# Patient Record
Sex: MALE | Employment: OTHER | URBAN - METROPOLITAN AREA
[De-identification: names, ages, dates, MRNs, and addresses within clinical notes are randomized per-mention and may not be internally consistent; named-entity substitution may affect disease eponyms.]

---

## 2018-02-07 PROBLEM — I45.9 HEART BLOCK: Status: ACTIVE | Noted: 2018-02-07

## 2018-02-07 NOTE — PLAN OF CARE
Outside Transfer Acceptance Note    Transferring Physician or Mid Level Provider/Speciality: Dr Spencer Sprague [phone]    Accepting Physician: Marge Simmons     Date of Acceptance: 02/07/2018     Code Status: Full    Transferring Facility/Hospital: [facility]    [place] [facility] [place]    Report from Transferring Physician or Mid-Level provider/Hospital course: 62 M with a PMH of cardiomyopathy s/p bi V AICD and the ventricular lead is not capturing intermittently.     Pt has a heart block with intermittent non capture pauses of 2.2 seconds. Will need revision of his AICD with possible replacement of his ventricular lead.     Dr Melanie Sol has discussed the case and will consult    To do list upon patient arrival: consult EP    Please call extension [phone] upon patient arrival to floor for Hospital Medicine admit team assignment and for additional admit orders. If patient is coming from another [facility] facility please also call [phone] to inform the admit team/office that patient has arrived from the [facility] facility to the floor so patient can be evaluated.

## 2018-02-09 ENCOUNTER — HOSPITAL ENCOUNTER (INPATIENT)
Facility: HOSPITAL | Age: 63
LOS: 4 days | Discharge: HOME HEALTH | DRG: 227 | End: 2018-02-13
Attending: HOSPITALIST | Admitting: HOSPITALIST
Payer: MEDICARE

## 2018-02-09 ENCOUNTER — ANESTHESIA EVENT (OUTPATIENT)
Dept: MEDSURG UNIT | Facility: HOSPITAL | Age: 63
DRG: 227 | End: 2018-02-09
Payer: MEDICARE

## 2018-02-09 ENCOUNTER — ANESTHESIA EVENT (OUTPATIENT)
Dept: MEDSURG UNIT | Facility: HOSPITAL | Age: 63
End: 2018-02-09

## 2018-02-09 DIAGNOSIS — I44.2 ATRIOVENTRICULAR BLOCK, COMPLETE: ICD-10-CM

## 2018-02-09 DIAGNOSIS — T82.120A: ICD-10-CM

## 2018-02-09 DIAGNOSIS — T82.110A AICD LEAD MALFUNCTION: ICD-10-CM

## 2018-02-09 DIAGNOSIS — I50.9 CHF (CONGESTIVE HEART FAILURE): ICD-10-CM

## 2018-02-09 DIAGNOSIS — I45.9 HEART BLOCK: ICD-10-CM

## 2018-02-09 DIAGNOSIS — I49.9 ARRHYTHMIA: ICD-10-CM

## 2018-02-09 DIAGNOSIS — T82.190A OTHER MECHANICAL COMPLICATION OF CARDIAC ELECTRODE, INITIAL ENCOUNTER: ICD-10-CM

## 2018-02-09 PROBLEM — R79.89 ELEVATED TROPONIN: Status: ACTIVE | Noted: 2018-02-09

## 2018-02-09 PROBLEM — Z79.01 CHRONIC ANTICOAGULATION: Status: ACTIVE | Noted: 2018-02-09

## 2018-02-09 PROBLEM — I50.22 CHRONIC SYSTOLIC HEART FAILURE: Status: ACTIVE | Noted: 2018-02-09

## 2018-02-09 PROBLEM — E78.5 HLD (HYPERLIPIDEMIA): Status: ACTIVE | Noted: 2018-02-09

## 2018-02-09 PROBLEM — I50.30 (HFPEF) HEART FAILURE WITH PRESERVED EJECTION FRACTION: Status: ACTIVE | Noted: 2018-02-09

## 2018-02-09 PROBLEM — K22.10 ESOPHAGEAL EROSIONS: Status: ACTIVE | Noted: 2018-02-09

## 2018-02-09 PROBLEM — I20.89 CHRONIC STABLE ANGINA: Status: ACTIVE | Noted: 2018-02-09

## 2018-02-09 PROBLEM — I10 ESSENTIAL HYPERTENSION: Status: ACTIVE | Noted: 2018-02-09

## 2018-02-09 PROBLEM — I25.810 CAD (CORONARY ARTERY DISEASE) OF ARTERY BYPASS GRAFT: Status: ACTIVE | Noted: 2018-02-09

## 2018-02-09 LAB
ABO + RH BLD: NORMAL
ALBUMIN SERPL BCP-MCNC: 3.7 G/DL
ALP SERPL-CCNC: 79 U/L
ALT SERPL W/O P-5'-P-CCNC: 30 U/L
ANION GAP SERPL CALC-SCNC: 7 MMOL/L
AORTIC VALVE REGURGITATION: ABNORMAL
AST SERPL-CCNC: 26 U/L
BASOPHILS # BLD AUTO: 0.02 K/UL
BASOPHILS NFR BLD: 0.4 %
BILIRUB SERPL-MCNC: 0.7 MG/DL
BLD GP AB SCN CELLS X3 SERPL QL: NORMAL
BNP SERPL-MCNC: 158 PG/ML
BUN SERPL-MCNC: 11 MG/DL
CALCIUM SERPL-MCNC: 9.7 MG/DL
CHLORIDE SERPL-SCNC: 102 MMOL/L
CO2 SERPL-SCNC: 26 MMOL/L
CREAT SERPL-MCNC: 1.1 MG/DL
DIFFERENTIAL METHOD: ABNORMAL
DIGOXIN SERPL-MCNC: 0.6 NG/ML
EOSINOPHIL # BLD AUTO: 0.1 K/UL
EOSINOPHIL NFR BLD: 2.2 %
ERYTHROCYTE [DISTWIDTH] IN BLOOD BY AUTOMATED COUNT: 13.5 %
EST. GFR  (AFRICAN AMERICAN): >60 ML/MIN/1.73 M^2
EST. GFR  (NON AFRICAN AMERICAN): >60 ML/MIN/1.73 M^2
ESTIMATED PA SYSTOLIC PRESSURE: 43.96
GLUCOSE SERPL-MCNC: 87 MG/DL
HCT VFR BLD AUTO: 40.8 %
HGB BLD-MCNC: 13.4 G/DL
IMM GRANULOCYTES # BLD AUTO: 0.03 K/UL
IMM GRANULOCYTES NFR BLD AUTO: 0.6 %
LYMPHOCYTES # BLD AUTO: 2.2 K/UL
LYMPHOCYTES NFR BLD: 41.3 %
MAGNESIUM SERPL-MCNC: 2 MG/DL
MCH RBC QN AUTO: 31.8 PG
MCHC RBC AUTO-ENTMCNC: 32.8 G/DL
MCV RBC AUTO: 97 FL
MITRAL VALVE REGURGITATION: ABNORMAL
MONOCYTES # BLD AUTO: 0.7 K/UL
MONOCYTES NFR BLD: 12.1 %
NEUTROPHILS # BLD AUTO: 2.3 K/UL
NEUTROPHILS NFR BLD: 43.4 %
NRBC BLD-RTO: 0 /100 WBC
PHOSPHATE SERPL-MCNC: 4.1 MG/DL
PLATELET # BLD AUTO: 232 K/UL
PMV BLD AUTO: 10.4 FL
POTASSIUM SERPL-SCNC: 4.3 MMOL/L
PROT SERPL-MCNC: 7.4 G/DL
RBC # BLD AUTO: 4.22 M/UL
RETIRED EF AND QEF - SEE NOTES: 38 (ref 55–65)
SODIUM SERPL-SCNC: 135 MMOL/L
TRICUSPID VALVE REGURGITATION: ABNORMAL
TROPONIN I SERPL DL<=0.01 NG/ML-MCNC: 0.37 NG/ML
WBC # BLD AUTO: 5.37 K/UL

## 2018-02-09 PROCEDURE — 20600001 HC STEP DOWN PRIVATE ROOM

## 2018-02-09 PROCEDURE — 85025 COMPLETE CBC W/AUTO DIFF WBC: CPT

## 2018-02-09 PROCEDURE — 25000003 PHARM REV CODE 250: Performed by: STUDENT IN AN ORGANIZED HEALTH CARE EDUCATION/TRAINING PROGRAM

## 2018-02-09 PROCEDURE — 93306 TTE W/DOPPLER COMPLETE: CPT | Mod: 26,,, | Performed by: INTERNAL MEDICINE

## 2018-02-09 PROCEDURE — 80053 COMPREHEN METABOLIC PANEL: CPT

## 2018-02-09 PROCEDURE — 84484 ASSAY OF TROPONIN QUANT: CPT

## 2018-02-09 PROCEDURE — 80162 ASSAY OF DIGOXIN TOTAL: CPT

## 2018-02-09 PROCEDURE — 99223 1ST HOSP IP/OBS HIGH 75: CPT | Mod: GC,,, | Performed by: INTERNAL MEDICINE

## 2018-02-09 PROCEDURE — 99223 1ST HOSP IP/OBS HIGH 75: CPT | Mod: AI,GC,, | Performed by: HOSPITALIST

## 2018-02-09 PROCEDURE — 93306 TTE W/DOPPLER COMPLETE: CPT

## 2018-02-09 PROCEDURE — 86920 COMPATIBILITY TEST SPIN: CPT

## 2018-02-09 PROCEDURE — 36415 COLL VENOUS BLD VENIPUNCTURE: CPT

## 2018-02-09 PROCEDURE — 84100 ASSAY OF PHOSPHORUS: CPT

## 2018-02-09 PROCEDURE — 25000003 PHARM REV CODE 250: Performed by: HOSPITALIST

## 2018-02-09 PROCEDURE — 93010 ELECTROCARDIOGRAM REPORT: CPT | Mod: ,,, | Performed by: INTERNAL MEDICINE

## 2018-02-09 PROCEDURE — 83735 ASSAY OF MAGNESIUM: CPT

## 2018-02-09 PROCEDURE — 93005 ELECTROCARDIOGRAM TRACING: CPT

## 2018-02-09 PROCEDURE — 86850 RBC ANTIBODY SCREEN: CPT

## 2018-02-09 PROCEDURE — 83880 ASSAY OF NATRIURETIC PEPTIDE: CPT

## 2018-02-09 RX ORDER — IBUPROFEN 200 MG
24 TABLET ORAL
Status: DISCONTINUED | OUTPATIENT
Start: 2018-02-09 | End: 2018-02-13 | Stop reason: HOSPADM

## 2018-02-09 RX ORDER — SODIUM CHLORIDE 0.9 % (FLUSH) 0.9 %
5 SYRINGE (ML) INJECTION
Status: DISCONTINUED | OUTPATIENT
Start: 2018-02-09 | End: 2018-02-13 | Stop reason: HOSPADM

## 2018-02-09 RX ORDER — FUROSEMIDE 40 MG/1
40 TABLET ORAL 2 TIMES DAILY
Status: DISCONTINUED | OUTPATIENT
Start: 2018-02-09 | End: 2018-02-13 | Stop reason: HOSPADM

## 2018-02-09 RX ORDER — ATORVASTATIN CALCIUM 20 MG/1
40 TABLET, FILM COATED ORAL DAILY
Status: DISCONTINUED | OUTPATIENT
Start: 2018-02-09 | End: 2018-02-13 | Stop reason: HOSPADM

## 2018-02-09 RX ORDER — GLUCAGON 1 MG
1 KIT INJECTION
Status: DISCONTINUED | OUTPATIENT
Start: 2018-02-09 | End: 2018-02-12

## 2018-02-09 RX ORDER — IBUPROFEN 200 MG
16 TABLET ORAL
Status: DISCONTINUED | OUTPATIENT
Start: 2018-02-09 | End: 2018-02-13 | Stop reason: HOSPADM

## 2018-02-09 RX ORDER — AMIODARONE HYDROCHLORIDE 200 MG/1
200 TABLET ORAL 2 TIMES DAILY
Status: DISCONTINUED | OUTPATIENT
Start: 2018-02-09 | End: 2018-02-13 | Stop reason: HOSPADM

## 2018-02-09 RX ORDER — ISOSORBIDE DINITRATE 30 MG/1
20 TABLET ORAL DAILY
Status: ON HOLD | COMMUNITY
End: 2018-02-09

## 2018-02-09 RX ORDER — RANOLAZINE 500 MG/1
500 TABLET, EXTENDED RELEASE ORAL 2 TIMES DAILY
Status: DISCONTINUED | OUTPATIENT
Start: 2018-02-09 | End: 2018-02-13 | Stop reason: HOSPADM

## 2018-02-09 RX ORDER — METOPROLOL SUCCINATE 25 MG/1
12.5 TABLET, EXTENDED RELEASE ORAL DAILY
Status: ON HOLD | COMMUNITY
End: 2018-10-25 | Stop reason: SDUPTHER

## 2018-02-09 RX ORDER — OXYCODONE AND ACETAMINOPHEN 10; 325 MG/1; MG/1
1 TABLET ORAL EVERY 12 HOURS PRN
Status: ON HOLD | COMMUNITY
End: 2018-10-19

## 2018-02-09 RX ORDER — ISOSORBIDE MONONITRATE 60 MG/1
60 TABLET, EXTENDED RELEASE ORAL DAILY
Status: ON HOLD | COMMUNITY
End: 2018-02-13 | Stop reason: HOSPADM

## 2018-02-09 RX ORDER — ACETAMINOPHEN 325 MG/1
650 TABLET ORAL EVERY 4 HOURS PRN
Status: DISCONTINUED | OUTPATIENT
Start: 2018-02-09 | End: 2018-02-13 | Stop reason: HOSPADM

## 2018-02-09 RX ORDER — NITROGLYCERIN 0.4 MG/1
0.4 TABLET SUBLINGUAL EVERY 5 MIN PRN
COMMUNITY

## 2018-02-09 RX ORDER — HYDRALAZINE HYDROCHLORIDE 25 MG/1
25 TABLET, FILM COATED ORAL 3 TIMES DAILY
Status: ON HOLD | COMMUNITY
End: 2018-02-13

## 2018-02-09 RX ORDER — DIGOXIN 250 MCG
250 TABLET ORAL DAILY
Status: DISCONTINUED | OUTPATIENT
Start: 2018-02-09 | End: 2018-02-09

## 2018-02-09 RX ORDER — OXYCODONE HYDROCHLORIDE 5 MG/1
5 TABLET ORAL ONCE
Status: COMPLETED | OUTPATIENT
Start: 2018-02-09 | End: 2018-02-09

## 2018-02-09 RX ORDER — ALBUTEROL SULFATE 90 UG/1
2 AEROSOL, METERED RESPIRATORY (INHALATION) EVERY 6 HOURS PRN
COMMUNITY

## 2018-02-09 RX ORDER — ONDANSETRON 8 MG/1
8 TABLET, ORALLY DISINTEGRATING ORAL EVERY 8 HOURS PRN
Status: DISCONTINUED | OUTPATIENT
Start: 2018-02-09 | End: 2018-02-13 | Stop reason: HOSPADM

## 2018-02-09 RX ORDER — ATORVASTATIN CALCIUM 40 MG/1
40 TABLET, FILM COATED ORAL DAILY
Status: ON HOLD | COMMUNITY
End: 2018-10-26 | Stop reason: SDUPTHER

## 2018-02-09 RX ORDER — POTASSIUM CHLORIDE 750 MG/1
20 TABLET, EXTENDED RELEASE ORAL 3 TIMES DAILY
Status: ON HOLD | COMMUNITY
End: 2018-10-26 | Stop reason: SDUPTHER

## 2018-02-09 RX ORDER — ISOSORBIDE MONONITRATE 30 MG/1
30 TABLET, EXTENDED RELEASE ORAL DAILY
Status: DISCONTINUED | OUTPATIENT
Start: 2018-02-09 | End: 2018-02-13 | Stop reason: HOSPADM

## 2018-02-09 RX ORDER — ISOSORBIDE DINITRATE 20 MG/1
20 TABLET ORAL DAILY
Status: DISCONTINUED | OUTPATIENT
Start: 2018-02-09 | End: 2018-02-09

## 2018-02-09 RX ORDER — AMIODARONE HYDROCHLORIDE 400 MG/1
400 TABLET ORAL 2 TIMES DAILY
Status: ON HOLD | COMMUNITY
End: 2018-02-13 | Stop reason: HOSPADM

## 2018-02-09 RX ORDER — DIGOXIN 250 MCG
250 TABLET ORAL DAILY
Status: DISCONTINUED | OUTPATIENT
Start: 2018-02-09 | End: 2018-02-11

## 2018-02-09 RX ORDER — OXYCODONE AND ACETAMINOPHEN 10; 325 MG/1; MG/1
1 TABLET ORAL EVERY 12 HOURS PRN
Status: DISCONTINUED | OUTPATIENT
Start: 2018-02-09 | End: 2018-02-12

## 2018-02-09 RX ORDER — DIGOXIN 250 MCG
250 TABLET ORAL DAILY
Status: ON HOLD | COMMUNITY
End: 2018-02-13 | Stop reason: HOSPADM

## 2018-02-09 RX ORDER — RANOLAZINE 500 MG/1
500 TABLET, EXTENDED RELEASE ORAL 2 TIMES DAILY
COMMUNITY

## 2018-02-09 RX ORDER — DIAZEPAM 10 MG/1
10 TABLET ORAL
Status: ON HOLD | COMMUNITY
End: 2018-02-09

## 2018-02-09 RX ORDER — NITROGLYCERIN 0.4 MG/1
0.4 TABLET SUBLINGUAL EVERY 5 MIN PRN
Status: DISCONTINUED | OUTPATIENT
Start: 2018-02-09 | End: 2018-02-13 | Stop reason: HOSPADM

## 2018-02-09 RX ADMIN — ATORVASTATIN CALCIUM 40 MG: 20 TABLET, FILM COATED ORAL at 10:02

## 2018-02-09 RX ADMIN — RANOLAZINE 500 MG: 500 TABLET, FILM COATED, EXTENDED RELEASE ORAL at 09:02

## 2018-02-09 RX ADMIN — AMIODARONE HYDROCHLORIDE 200 MG: 200 TABLET ORAL at 10:02

## 2018-02-09 RX ADMIN — OXYCODONE HYDROCHLORIDE 5 MG: 5 TABLET ORAL at 05:02

## 2018-02-09 RX ADMIN — METOPROLOL SUCCINATE 12.5 MG: 25 TABLET, EXTENDED RELEASE ORAL at 10:02

## 2018-02-09 RX ADMIN — RANOLAZINE 500 MG: 500 TABLET, FILM COATED, EXTENDED RELEASE ORAL at 11:02

## 2018-02-09 RX ADMIN — SACUBITRIL AND VALSARTAN 1 TABLET: 49; 51 TABLET, FILM COATED ORAL at 09:02

## 2018-02-09 RX ADMIN — SACUBITRIL AND VALSARTAN 1 TABLET: 49; 51 TABLET, FILM COATED ORAL at 11:02

## 2018-02-09 RX ADMIN — DIGOXIN 250 MCG: 250 TABLET ORAL at 02:02

## 2018-02-09 RX ADMIN — ISOSORBIDE MONONITRATE 30 MG: 30 TABLET, EXTENDED RELEASE ORAL at 10:02

## 2018-02-09 RX ADMIN — FUROSEMIDE 40 MG: 40 TABLET ORAL at 09:02

## 2018-02-09 RX ADMIN — AMIODARONE HYDROCHLORIDE 200 MG: 200 TABLET ORAL at 09:02

## 2018-02-09 NOTE — HPI
62 year old gentleman with a hx of CAD s/p CABG 2014, ICM 38%, CHB, BiV-ICD (biotronik), AF/AFL, HTN, HLD, MVR prosthetic who presented on 1/27 after a syncopal event. Device interrogated and he was noted to be in VT for which he received appropriate shock. Also had RV lead dysfunction and pauses on interrogation did not result in syncope. No LHC was done there but he had one in 08/2017 without any significant changes. He was started on Amiodarone 400 BID and transferred here for possible lead extraction vs VT ablation.     Here he is LV paced.

## 2018-02-09 NOTE — ANESTHESIA PREPROCEDURE EVALUATION
[facility]  Anesthesia Pre-Operative Evaluation         Patient Name: Quentin Mckeon  YOB: 1955  MRN: [id number]    SUBJECTIVE:     Pre-operative evaluation for Procedure(s) (LRB):  INSERTION-ICD-BIVENTRICULAR (N/A)     02/09/2018    Quentin Mckeon is a 62 y.o. male w/ a significant PMHx of hx of DCM s/p bi V AICD, CABG 2014, HFrEF (EF 38, echo 8/2017), HTN, MV biprothetic valve repair, Afib/Aflutter (on rivaroxaban), Vfib/Vtach, former EtOH abuse, current smoker (has inhaler he has used twice in past month, no hosp admits for wheezing). Patient was admitted on 2/9/2018 for heart block with intermittent non-capture pauses of 2.2 seconds.  Will need revision of AICD with possible replacement of ventricular lead.     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    Patient now presents for the above procedure(s).      LDA: None documented.     Prev airway: None documented.    Drips: None documented.      Patient Active Problem List   Diagnosis    Heart block    AICD lead malfunction    Essential hypertension    HLD (hyperlipidemia)    Esophageal erosions    Elevated troponin    Chronic systolic heart failure    CAD (coronary artery disease) of artery bypass graft    Chronic anticoagulation    Chronic stable angina       Review of patient's allergies indicates:  No Known Allergies    Current Inpatient Medications:   amiodarone  200 mg Oral BID    atorvastatin  40 mg Oral Daily    furosemide  40 mg Oral BID    isosorbide mononitrate  30 mg Oral Daily    metoprolol succinate  12.5 mg Oral Daily    ranolazine  500 mg Oral BID    rivaroxaban  20 mg Oral Daily    sacubitril-valsartan  1 tablet Oral BID       No current facility-administered medications on file prior to encounter.      No current outpatient prescriptions on file prior to encounter.       History reviewed. No  pertinent surgical history.    Social History     Social History    Marital status: Unknown     Spouse name: N/A    Number of children: N/A    Years of education: N/A     Occupational History    Not on file.     Social History Main Topics    Smoking status: Current Every Day Smoker     Packs/day: 0.25     Years: 50.00    Smokeless tobacco: Current User     Types: Snuff    Alcohol use No    Drug use: No    Sexual activity: Not on file     Other Topics Concern    Not on file     Social History Narrative    No narrative on file       OBJECTIVE:     Vital Signs Range (Last 24H):  Temp:  [36.8 °C (98.3 °F)]   Pulse:  [79-80]   Resp:  [14]   BP: (132)/(99)   SpO2:  [93 %-96 %]       CBC:   Recent Labs      02/09/18   0706   WBC  5.37   RBC  4.22*   HGB  13.4*   HCT  40.8   PLT  232   MCV  97   MCH  31.8*   MCHC  32.8       CMP:   Recent Labs      02/09/18   0706   NA  135*   K  4.3   CL  102   CO2  26   BUN  11   CREATININE  1.1   GLU  87   MG  2.0   PHOS  4.1   CALCIUM  9.7   ALBUMIN  3.7   PROT  7.4   ALKPHOS  79   ALT  30   AST  26   BILITOT  0.7       INR:  No results for input(s): PT, INR, PROTIME, APTT in the last 72 hours.    Diagnostic Studies:     EKG: Pending    2D ECHO:  Results for orders placed or performed during the hospital encounter of 02/09/18   2D echo with color flow doppler   Result Value Ref Range    EF 38 (A) 55 - 65    Mitral Valve Regurgitation TRIVIAL     Aortic Valve Regurgitation TRIVIAL     Est. PA Systolic Pressure 43.96 (A)     Tricuspid Valve Regurgitation MILD          ASSESSMENT/PLAN:       Anesthesia Evaluation    I have reviewed the Patient Summary Reports.     I have reviewed the Medications.     Review of Systems  Anesthesia Hx:  No problems with previous Anesthesia Denies Hx of Anesthetic complications  History of prior surgery of interest to airway management or planning: Denies Family Hx of Anesthesia complications.   Denies Personal Hx of Anesthesia complications.    Social:  Smoker, No Alcohol Use    Hematology/Oncology:     Oncology Normal    -- Anemia:   EENT/Dental:EENT/Dental Normal   Cardiovascular:   Pacemaker Hypertension Past MI CAD  CABG/stent Dysrhythmias  Angina, with exertion CHF hyperlipidemia    Pulmonary:   Denies COPD.  Denies Asthma. Current smoker   Renal/:  Renal/ Normal     Hepatic/GI:  Hepatic/GI Normal    Neurological:  Neurology Normal    Endocrine:  Endocrine Normal    Psych:  Psychiatric Normal           Physical Exam  General:  Obesity    Airway/Jaw/Neck:  Airway Findings: Mouth Opening: Normal Tongue: Normal  General Airway Assessment: Adult  Mallampati: II  TM Distance: Normal, at least 6 cm  Jaw/Neck Findings:  Neck ROM: Normal ROM  Neck Findings: Normal    Eyes/Ears/Nose:  EYES/EARS/NOSE FINDINGS: Normal   Dental:  Dental Findings: In tact    Chest/Lungs:  Chest/Lungs Findings: Clear to auscultation, Normal Respiratory Rate     Heart/Vascular:  Heart Findings: Rate: Normal  Rhythm: Regular Rhythm  Sounds: Normal  Heart murmur: negative       Mental Status:  Mental Status Findings:  Cooperative, Alert and Oriented         Anesthesia Plan  Type of Anesthesia, risks & benefits discussed:  Anesthesia Type:  general, MAC  Patient's Preference:   Intra-op Monitoring Plan: standard ASA monitors  Intra-op Monitoring Plan Comments:   Post Op Pain Control Plan: multimodal analgesia, IV/PO Opioids PRN and per primary service following discharge from PACU  Post Op Pain Control Plan Comments:   Induction:   IV  Beta Blocker:  Patient is on a Beta-Blocker and has received one dose within the past 24 hours (No further documentation required).       Informed Consent: Patient understands risks and agrees with Anesthesia plan.  Questions answered. Anesthesia consent signed with patient.  ASA Score: 3     Day of Surgery Review of History & Physical: I have interviewed and examined the patient. I have reviewed the patient's H&P dated:  There are no significant  changes.  H&P update referred to the provider.         Ready For Surgery From Anesthesia Perspective.

## 2018-02-09 NOTE — ASSESSMENT & PLAN NOTE
- Patient is asymptomatic and stable  - Treating with home meds- ranolazine and nitroglycerin as needed  - Continue to monitor

## 2018-02-09 NOTE — ASSESSMENT & PLAN NOTE
- Patient is asymptomatic and stable  - Treating with home meds- Toprol, Digoxin, Isosorbide dinitrate,   - Continue to monitor

## 2018-02-09 NOTE — SUBJECTIVE & OBJECTIVE
History reviewed. No pertinent past medical history.    History reviewed. No pertinent surgical history.    Review of patient's allergies indicates:  No Known Allergies    No current facility-administered medications on file prior to encounter.      No current outpatient prescriptions on file prior to encounter.     Family History     None        Social History Main Topics    Smoking status: Current Every Day Smoker     Packs/day: 0.25     Years: 50.00    Smokeless tobacco: Current User     Types: Snuff    Alcohol use No    Drug use: No    Sexual activity: Not on file     Review of Systems   Constitutional: Negative for chills, fever and unexpected weight change.   HENT: Negative for hearing loss.    Eyes: Negative for visual disturbance.   Respiratory: Negative for cough, shortness of breath and wheezing.    Cardiovascular: Negative for chest pain, palpitations and leg swelling.   Gastrointestinal: Negative for abdominal pain, blood in stool, constipation, diarrhea, nausea and vomiting.   Genitourinary: Negative for dysuria and hematuria.   Musculoskeletal: Negative for arthralgias.   Skin: Negative for rash.   Neurological: Negative for dizziness, tremors, seizures, syncope, weakness and headaches.   Hematological: Negative for adenopathy. Does not bruise/bleed easily.     Objective:     Vital Signs (Most Recent):  Temp: 98.3 °F (36.8 °C) (02/09/18 0415)  Pulse: 80 (02/09/18 0700)  Resp: 14 (02/09/18 0445)  BP: (!) 132/99 (02/09/18 0445)  SpO2: 96 % (02/09/18 0501) Vital Signs (24h Range):  Temp:  [98.3 °F (36.8 °C)] 98.3 °F (36.8 °C)  Pulse:  [79-80] 80  Resp:  [14] 14  SpO2:  [93 %-96 %] 96 %  BP: (132)/(99) 132/99     Weight: 105.2 kg (231 lb 14.8 oz)  Body mass index is 34.25 kg/m².    Physical Exam   Constitutional: He appears well-developed and well-nourished.   HENT:   Head: Normocephalic and atraumatic.   Eyes: EOM are normal. Pupils are equal, round, and reactive to light.   Neck: No JVD present. No  tracheal deviation present. No thyromegaly present.   Cardiovascular: Normal rate, regular rhythm and normal heart sounds.    No murmur heard.  Pulmonary/Chest: Effort normal and breath sounds normal. He has no wheezes. He has no rales.   Abdominal: Soft. He exhibits no distension. There is no tenderness. No hernia.   Musculoskeletal: He exhibits no edema or tenderness.   Neurological: No cranial nerve deficit. He exhibits normal muscle tone.   Skin: Skin is warm. No rash noted.   Psychiatric: He has a normal mood and affect.         CRANIAL NERVES     CN III, IV, VI   Pupils are equal, round, and reactive to light.  Extraocular motions are normal.        Significant Labs:   CBC:   Recent Labs  Lab 02/09/18  0706   WBC 5.37   HGB 13.4*   HCT 40.8        CMP:   Recent Labs  Lab 02/09/18  0706   *   K 4.3      CO2 26   GLU 87   BUN 11   CREATININE 1.1   CALCIUM 9.7   PROT 7.4   ALBUMIN 3.7   BILITOT 0.7   ALKPHOS 79   AST 26   ALT 30   ANIONGAP 7*   EGFRNONAA >60.0     Cardiac Markers:   Recent Labs  Lab 02/09/18  0706   *     Magnesium:   Recent Labs  Lab 02/09/18  0706   MG 2.0     Troponin:   Recent Labs  Lab 02/09/18  0706   TROPONINI 0.373*       Significant Imaging: I have reviewed all pertinent imaging results/findings within the past 24 hours.   Echo CONCLUSIONS     1 - Mild left ventricular enlargement.     2 - Moderately depressed left ventricular systolic function (EF 35-40%).     3 - Eccentric hypertrophy.     4 - Wall motion abnormalities.     5 - Biatrial enlargement.     6 - Low normal to mildly depressed right ventricular systolic function .     7 - Trivial aortic regurgitation.     8 - Mitral valve prosthesis.     9 - Trivial mitral regurgitation.     10 - Mild tricuspid regurgitation.     11 - Trivial pulmonic regurgitation.     12 - Pulmonary hypertension. The estimated PA systolic pressure is 44 mmHg.

## 2018-02-09 NOTE — CONSULTS
[facility]  Cardiac Electrophysiology  Consult Note    Admission Date: 2/9/2018  Code Status: Full Code   Attending Provider: Tre Can MD  Consulting Provider: Anna Gómez MD  Principal Problem:AICD lead malfunction    Inpatient consult to Electrophysiology  Consult performed by: ANNA GÓMEZ  Consult ordered by: JE MELTON  Reason for consult: Lead fracture        Subjective:     Chief Complaint:  syncope     HPI:   62 year old gentleman with a hx of CAD s/p CABG 2014, ICM 38%, CHB, BiV-ICD (biotronik), AF/AFL, HTN, HLD, MVR prosthetic who presented on 1/27 after a syncopal event. Device interrogated and he was noted to be in VT for which he received appropriate shock. Also had RV lead dysfunction and pauses on interrogation did not result in syncope. No LHC was done there but he had one in 08/2017 without any significant changes. He was started on Amiodarone 400 BID and transferred here for possible lead extraction vs VT ablation.     Here he is LV paced.     History as above    History as above.     Review of patient's allergies indicates:  No Known Allergies    No current facility-administered medications on file prior to encounter.      No current outpatient prescriptions on file prior to encounter.     Family History     None        Social History Main Topics    Smoking status: Current Every Day Smoker     Packs/day: 0.25     Years: 50.00    Smokeless tobacco: Current User     Types: Snuff    Alcohol use No    Drug use: No    Sexual activity: Not on file     Review of Systems   All other systems reviewed and are negative.    Objective:     Vital Signs (Most Recent):  Temp: 97.9 °F (36.6 °C) (02/09/18 0800)  Pulse: 80 (02/09/18 1100)  Resp: 18 (02/09/18 0800)  BP: (!) 120/91 (02/09/18 0800)  SpO2: (!) 91 % (02/09/18 0800) Vital Signs (24h Range):  Temp:  [97.9 °F (36.6 °C)-98.3 °F (36.8 °C)] 97.9 °F (36.6 °C)  Pulse:  [79-80] 80  Resp:  [14-18] 18  SpO2:  [91 %-96 %]  91 %  BP: (120-132)/(91-99) 120/91     Weight: 105.2 kg (231 lb 14.8 oz)  Body mass index is 34.25 kg/m².    SpO2: (!) 91 %  O2 Device (Oxygen Therapy): room air    Physical Exam  GEN: Alert and oriented in NAD  NECK: no JVD appreciated  CVS: RRR, s1/s2  PULM: CTAB no rales  ABD: NT/ND BS +  Extremities: warm and dry, palpable pulses, no edema  NEURO: Alert and oriented x 3  PSYCH: appropriate affect.       Significant Labs: reviewed    Significant Imaging: reviewed    Assessment and Plan:     * AICD lead malfunction    62 year old gentleman with a hx of CAD s/p CABG 2014, ICM 38%, CHB, BiV-ICD (biotronik), AF/AFL, HTN, HLD, MVR prosthetic who presented on 1/27 after a syncopal event. Device interrogated and he was noted to be in VT for which he received appropriate shock. Also had RV lead dysfunction and pauses on interrogation did not result in syncope. No LHC was done there but he had one in 08/2017 without any significant changes. He was started on Amiodarone 400 BID and transferred here for possible lead extraction vs VT ablation.     With likely lead fracture will need an extraction and lead revision which is planned for Monday. Will stop xarelto. Have ordered type and screen and prepared 4 units of blood. Have ordered a device interrogation.             Thank you for your consult. I will follow-up with patient. Please contact us if you have any additional questions.    Sean Gómez MD  Cardiac Electrophysiology  [facility]

## 2018-02-09 NOTE — ANESTHESIA PREPROCEDURE EVALUATION
"[facility]  Anesthesia Pre-Operative Evaluation         Patient Name: Quentin Mckeon  YOB: 1955  MRN: [id number]    SUBJECTIVE:     Pre-operative evaluation for Procedure(s) (LRB):  EXTRACTION-LEAD (N/A)  Scheduled for 2/12/2018    HPI 02/09/2018:  Quentin Mckeon is a 62 y.o. male with hx of DCM s/p bi V AICD, CABG 2014, HFrEF (EF 38, echo 8/2017), HTN, MV biprothetic valve repair, Afib/Aflutter (on rivaroxaban), Vfib/Vtach, former EtOH abuse, current smoker (has inhaler he has used twice in past month, no hosp admits for wheezing).    TTE 2/9/2018: MVR prosthesis, pulm HTN 44, LVEF 35%, biatrial enlargement, low-mild depressed RV systolic function.  Per patient, hx of "small heart attack" in the past.    The following segments were dyskinetic: mid inferoseptum, basal inferoseptum.  The following segments were akinetic: apical inferior wall.  The following segments were moderately hypokinetic: basal inferior wall.  The following segments were severely hypokinetic: apical septum, mid inferior wall.    Patient was admitted on 2/9/2018 for heart block with intermittent non-capture pauses of 2.2 seconds.  Will need revision of AICD with possible replacement of ventricular lead.  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    Patient presents for the above procedure(s).    Prev airway:   No prior records in Epic or Legacy Documents.    Oxygen/Ventilation Requirements:  On room air satting low to mid 90s.       Current LDA:   None documented.       Current Drips:  None documented.      Patient Active Problem List   Diagnosis    Heart block       Review of patient's allergies indicates:  No Known Allergies    Outpatient Medications:  No current facility-administered medications on file prior to encounter.      No current outpatient prescriptions on file prior to encounter.        Current " Inpatient Medications:   amiodarone  200 mg Oral BID    atorvastatin  40 mg Oral Daily    digoxin  250 mcg Oral Daily    furosemide  40 mg Oral BID    isosorbide dinitrate  20 mg Oral Daily    metoprolol succinate  12.5 mg Oral Daily    ranolazine  500 mg Oral BID    rivaroxaban  20 mg Oral Daily    sacubitril-valsartan  1 tablet Oral BID       History reviewed. No pertinent surgical history.    Social History     Social History    Marital status: Unknown     Spouse name: N/A    Number of children: N/A    Years of education: N/A     Occupational History    Not on file.     Social History Main Topics    Smoking status: Current Every Day Smoker     Packs/day: 0.25     Years: 50.00    Smokeless tobacco: Current User     Types: Snuff    Alcohol use No    Drug use: No    Sexual activity: Not on file     Other Topics Concern    Not on file     Social History Narrative    No narrative on file       OBJECTIVE:   Weight:  Wt Readings from Last 4 Encounters:   02/09/18 105.2 kg (231 lb 14.8 oz)       Vital Signs Range (Last 24H):  Temp:  [36.8 °C (98.3 °F)]   Pulse:  [79-80]   Resp:  [14]   BP: (132)/(99)   SpO2:  [93 %-96 %]       CBC:   Recent Labs      02/09/18   0706   WBC  5.37   RBC  4.22*   HGB  13.4*   HCT  40.8   PLT  232   MCV  97   MCH  31.8*   MCHC  32.8       CMP: No results for input(s): NA, K, CL, CO2, BUN, CREATININE, GLU, MG, PHOS, CALCIUM, ALBUMIN, PROT, ALKPHOS, ALT, AST, BILITOT in the last 72 hours.    INR:  No results for input(s): INR, PROTIME, APTT in the last 72 hours.    Diagnostic Studies:  none    EKG:  In process     2D Echo:  2/9/2018    1 - Mild left ventricular enlargement.     2 - Moderately depressed left ventricular systolic function (EF 35-40%).     3 - Eccentric hypertrophy.     4 - Wall motion abnormalities.     5 - Biatrial enlargement.     6 - Low normal to mildly depressed right ventricular systolic function .     7 - Trivial aortic regurgitation.     8 - Mitral  valve prosthesis.     9 - Trivial mitral regurgitation.     10 - Mild tricuspid regurgitation.     11 - Trivial pulmonic regurgitation.     12 - Pulmonary hypertension. The estimated PA systolic pressure is 44 mmHg.     ASSESSMENT/PLAN:         Anesthesia Evaluation    I have reviewed the Patient Summary Reports.     I have reviewed the Medications.     Review of Systems  Anesthesia Hx:  No problems with previous Anesthesia Denies Hx of Anesthetic complications  History of prior surgery of interest to airway management or planning: Denies Family Hx of Anesthesia complications.   Denies Personal Hx of Anesthesia complications.   Social:  Smoker, No Alcohol Use    Hematology/Oncology:     Oncology Normal    -- Anemia:   EENT/Dental:EENT/Dental Normal   Cardiovascular:   Pacemaker Hypertension Past MI CAD  CABG/stent Dysrhythmias  Angina, with exertion CHF hyperlipidemia    Pulmonary:   Denies COPD.  Denies Asthma. Current smoker   Renal/:  Renal/ Normal     Hepatic/GI:  Hepatic/GI Normal    Neurological:  Neurology Normal    Endocrine:  Endocrine Normal    Psych:  Psychiatric Normal           Physical Exam  General:  Obesity    Airway/Jaw/Neck:  Airway Findings: Mouth Opening: Normal Tongue: Normal  General Airway Assessment: Adult  Mallampati: II  TM Distance: Normal, at least 6 cm  Jaw/Neck Findings:  Neck ROM: Normal ROM  Neck Findings: Normal    Eyes/Ears/Nose:  EYES/EARS/NOSE FINDINGS: Normal   Dental:  Dental Findings: In tact    Chest/Lungs:  Chest/Lungs Findings: Clear to auscultation, Normal Respiratory Rate     Heart/Vascular:  Heart Findings: Rate: Normal  Rhythm: Regular Rhythm  Sounds: Normal  Heart murmur: negative       Mental Status:  Mental Status Findings:  Cooperative, Alert and Oriented         Anesthesia Plan  Type of Anesthesia, risks & benefits discussed:  Anesthesia Type:  general  Patient's Preference:   Intra-op Monitoring Plan: standard ASA monitors and arterial line  Intra-op Monitoring  Plan Comments:   Post Op Pain Control Plan: multimodal analgesia, IV/PO Opioids PRN and per primary service following discharge from PACU  Post Op Pain Control Plan Comments:   Induction:   IV  Beta Blocker:  Patient is on a Beta-Blocker and has received one dose within the past 24 hours (No further documentation required).       Informed Consent: Patient understands risks and agrees with Anesthesia plan.  Questions answered. Anesthesia consent signed with patient.  ASA Score: 3     Day of Surgery Review of History & Physical:  There are no significant changes.  H&P update referred to the provider.         Ready For Surgery From Anesthesia Perspective.

## 2018-02-09 NOTE — ASSESSMENT & PLAN NOTE
62 year old gentleman with a hx of CAD s/p CABG 2014, ICM 38%, CHB, BiV-ICD (biotronik), AF/AFL, HTN, HLD, MVR prosthetic who presented on 1/27 after a syncopal event. Device interrogated and he was noted to be in VT for which he received appropriate shock. Also had RV lead dysfunction and pauses on interrogation did not result in syncope. No LHC was done there but he had one in 08/2017 without any significant changes. He was started on Amiodarone 400 BID and transferred here for possible lead extraction vs VT ablation.     With likely lead fracture will need an extraction and lead revision which is planned for Monday. Will stop xarelto. Have ordered type and screen and prepared 4 units of blood. Have ordered a device interrogation.

## 2018-02-09 NOTE — PHARMACY MED REC
"Admission Medication Reconciliation - Pharmacy Consult Note    The home medication history was taken by Aleena Mena Pharmacy Tech.  Based on information gathered and subsequent review by the clinical pharmacist, the items below may need attention.     You may go to "Admission" then "Reconcile Home Medications" tabs to review and/or act upon these items. Based on information gathered and subsequent review by the clinical pharmacist, the items below may need attention.    Potentially problematic discrepancies with current MAR  o Patient is taking a drug DIFFERENTLY than how ordered upon admit  o Amiodarone 400 mg bid   o Lasix 60 mg daily    Please address this information as you see fit.  Feel free to contact us if you have any questions or require assistance.    Carmela Holt PharmD, [facility]  Internal Medicine Clinical Pharmacy Specialist  Spectra link: [phone]            .    .        "

## 2018-02-09 NOTE — ASSESSMENT & PLAN NOTE
- Patient is asymptomatic and stable  - Treating with home meds- lipitor   - Continue to monitor

## 2018-02-09 NOTE — SUBJECTIVE & OBJECTIVE
History reviewed. No pertinent past medical history.    History reviewed. No pertinent surgical history.    Review of patient's allergies indicates:  No Known Allergies    No current facility-administered medications on file prior to encounter.      No current outpatient prescriptions on file prior to encounter.     Family History     None        Social History Main Topics    Smoking status: Current Every Day Smoker     Packs/day: 0.25     Years: 50.00    Smokeless tobacco: Current User     Types: Snuff    Alcohol use No    Drug use: No    Sexual activity: Not on file     Review of Systems   All other systems reviewed and are negative.    Objective:     Vital Signs (Most Recent):  Temp: 97.9 °F (36.6 °C) (02/09/18 0800)  Pulse: 80 (02/09/18 1100)  Resp: 18 (02/09/18 0800)  BP: (!) 120/91 (02/09/18 0800)  SpO2: (!) 91 % (02/09/18 0800) Vital Signs (24h Range):  Temp:  [97.9 °F (36.6 °C)-98.3 °F (36.8 °C)] 97.9 °F (36.6 °C)  Pulse:  [79-80] 80  Resp:  [14-18] 18  SpO2:  [91 %-96 %] 91 %  BP: (120-132)/(91-99) 120/91     Weight: 105.2 kg (231 lb 14.8 oz)  Body mass index is 34.25 kg/m².    SpO2: (!) 91 %  O2 Device (Oxygen Therapy): room air    Physical Exam  GEN: Alert and oriented in NAD  NECK: no JVD appreciated  CVS: RRR, s1/s2  PULM: CTAB no rales  ABD: NT/ND BS +  Extremities: warm and dry, palpable pulses, no edema  NEURO: Alert and oriented x 3  PSYCH: appropriate affect.       Significant Labs: reviewed    Significant Imaging: reviewed

## 2018-02-09 NOTE — NURSING
Patient arrived to floor on stretcher from [facility]. Patient placed on CSU telemetry monitor. Patient is AAO. VSS, NAD noted, Patient oriented to room and floor, CSU orders implemented. Plan of care initiated. Patient notified family of admit and room number. Patient in chair, chair in locked position, side rails up x2, call bell in reach.

## 2018-02-09 NOTE — HPI
Patient is a 62 yo M with DCM s/p bi V AICD, CABG 2014, HFrEF (EF 38, echo 8/2017), HTN, MV biprothetic valve repair, Afib/Aflutter, Vfib/Vtach, former EtOH abuse, tobacco use,  who presents with a ventricular lead that is not capturing intermittently. On 1/27/18 the patient was at home watching television on the side of his bed when he passed out. This came out of nowhere and he fell back onto the bed and the next thing he knew he was coming to which he estimated at just a few seconds. Patient did not lose continence or have any jerking movements he was aware of. While coming to he was a bit confused but realized something was wrong, called 911 and got transported to the hospital. While in the hospital, the patient had his leads interrogated and showed a V tach episode with appropriate shock. A CT head was negative done at this time. Once patient was at the hospital, there is a note from cardiology about chest pain radiating down his arm and him taking nitro with no relief, but patient denies this to me. Cardiology consultation at [place] noted that he has malfunction of the pacing portion of the right ventricular lead resulting in noise on the RV lead channel.  Pt has a heart block with intermittent non capture pauses of 2.2 seconds. This was deemed to be not leading to his syncope as that is a result of ventricular arrhythmia which they say it may be reasonable to consider an endocardial VT ablation with assistance of impella or IABP for recurrent hemodynamically unstable sustained V tach despite amiodarone therapy requiring ICD shock for termination in the setting of chronic systolic dysfunction.   Patient became febrile on 1/31/18 and ID was consulted. They diagnosed pt with the flu while in hospital. BCx and UCx were negative and he was treated with tamiflu. Pt also had a TTE showinga mobile echogenic mass attached to his pacer wire. 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 Vanc and rocephin were also started, but stopped on 2/1/18 when it was determined that the vegetation was not an infection.      At [facility] 2/9/18 the patient has no complaints and is asymptomatic. He has recovered from the flu and is awaiting a consult with EP for R ventricular lead replacement in his AICD. Of note patient is former alcoholic and only other syncope episode was while he was drinking. He was at that time drinking 1/5 of elisabeth posadas per day. He has been sober for 1 year now.

## 2018-02-09 NOTE — ASSESSMENT & PLAN NOTE
- Patient is asymptomatic and stable  - Treating with home meds- Lasix 40 BID, Toprol, Digoxin, Isosorbide dinitrate,   - Continue to monitor

## 2018-02-09 NOTE — ASSESSMENT & PLAN NOTE
- Patient with heart block as seen by cardiology at OSH.   - EP consulted- appreciate recs  - Echo ordered- results as above.  - Treat with amiodarone and toprol XL  - Currently patient is asymptomatic/stable, continue to monitor

## 2018-02-09 NOTE — ASSESSMENT & PLAN NOTE
- Patient without CP but with troponin .37, which is less than outside hospital, where he presented with troponin of .55.  - EKG ordered  - Patient asymptomatic, monitor

## 2018-02-09 NOTE — ASSESSMENT & PLAN NOTE
- Patient with R ventricular lead malfunction.  - EP consult- appreciate recs  - Currently patient is asymptomatic/stable, continue to monitor

## 2018-02-09 NOTE — H&P
[facility] Hospital Medicine  History & Physical    Patient Name: Quentin Mckeon  MRN: [id number]  Admission Date: 2/9/2018  Attending Physician: Tre Can MD   Primary Care Provider: Primary Doctor [facility] Medicine Team: [facility] HOSP MED 4 Humberto Hernandez MD     Patient information was obtained from patient and ER records.     Subjective:     Principal Problem:AICD lead malfunction    Chief Complaint: No chief complaint on file.       HPI: Patient is a 62 yo M with DCM s/p bi V AICD, CABG 2014, HFrEF (EF 38, echo 8/2017), HTN, MV biprothetic valve repair, Afib/Aflutter, Vfib/Vtach, former EtOH abuse, tobacco use,  who presents with a ventricular lead that is not capturing intermittently. On 1/27/18 the patient was at home watching television on the side of his bed when he passed out. This came out of nowhere and he fell back onto the bed and the next thing he knew he was coming to which he estimated at just a few seconds. Patient did not lose continence or have any jerking movements he was aware of. While coming to he was a bit confused but realized something was wrong, called 911 and got transported to the hospital. While in the hospital, the patient had his leads interrogated and showed a V tach episode with appropriate shock. A CT head was negative done at this time. Once patient was at the hospital, there is a note from cardiology about chest pain radiating down his arm and him taking nitro with no relief, but patient denies this to me. Cardiology consultation at [place] noted that he has malfunction of the pacing portion of the right ventricular lead resulting in noise on the RV lead channel.  Pt has a heart block with intermittent non capture pauses of 2.2 seconds. This was deemed to be not leading to his syncope as that is a result of ventricular arrhythmia which they say it may be reasonable to consider an endocardial VT ablation with assistance of impella or IABP for recurrent  hemodynamically unstable sustained V tach despite amiodarone therapy requiring ICD shock for termination in the setting of chronic systolic dysfunction.   Patient became febrile on 1/31/18 and ID was consulted. They diagnosed pt with the flu while in hospital. BCx and UCx were negative and he was treated with tamiflu. Pt also had a TTE showinga mobile echogenic mass attached to his pacer wire. 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 Vanc and rocephin were also started, but stopped on 2/1/18 when it was determined that the vegetation was not an infection.    At [facility] 2/9/18 the patient has no complaints and is asymptomatic. He has recovered from the flu and is awaiting a consult with EP for R ventricular lead replacement in his AICD. Of note patient is former alcoholic and only other syncope episode was while he was drinking. He was at that time drinking 1/5 of elisabeth posadas per day. He has been sober for 1 year now.     History reviewed. No pertinent past medical history.    History reviewed. No pertinent surgical history.    Review of patient's allergies indicates:  No Known Allergies    No current facility-administered medications on file prior to encounter.      No current outpatient prescriptions on file prior to encounter.     Family History     None        Social History Main Topics    Smoking status: Current Every Day Smoker     Packs/day: 0.25     Years: 50.00    Smokeless tobacco: Current User     Types: Snuff    Alcohol use No    Drug use: No    Sexual activity: Not on file     Review of Systems   Constitutional: Negative for chills, fever and unexpected weight change.   HENT: Negative for hearing loss.    Eyes: Negative for visual disturbance.   Respiratory: Negative for cough, shortness of breath and wheezing.    Cardiovascular: Negative for chest pain, palpitations and leg swelling.   Gastrointestinal: Negative for abdominal pain, blood in  stool, constipation, diarrhea, nausea and vomiting.   Genitourinary: Negative for dysuria and hematuria.   Musculoskeletal: Negative for arthralgias.   Skin: Negative for rash.   Neurological: Negative for dizziness, tremors, seizures, syncope, weakness and headaches.   Hematological: Negative for adenopathy. Does not bruise/bleed easily.     Objective:     Vital Signs (Most Recent):  Temp: 98.3 °F (36.8 °C) (02/09/18 0415)  Pulse: 80 (02/09/18 0700)  Resp: 14 (02/09/18 0445)  BP: (!) 132/99 (02/09/18 0445)  SpO2: 96 % (02/09/18 0501) Vital Signs (24h Range):  Temp:  [98.3 °F (36.8 °C)] 98.3 °F (36.8 °C)  Pulse:  [79-80] 80  Resp:  [14] 14  SpO2:  [93 %-96 %] 96 %  BP: (132)/(99) 132/99     Weight: 105.2 kg (231 lb 14.8 oz)  Body mass index is 34.25 kg/m².    Physical Exam   Constitutional: He appears well-developed and well-nourished.   HENT:   Head: Normocephalic and atraumatic.   Eyes: EOM are normal. Pupils are equal, round, and reactive to light.   Neck: No JVD present. No tracheal deviation present. No thyromegaly present.   Cardiovascular: Normal rate, regular rhythm and normal heart sounds.    No murmur heard.  Pulmonary/Chest: Effort normal and breath sounds normal. He has no wheezes. He has no rales.   Abdominal: Soft. He exhibits no distension. There is no tenderness. No hernia.   Musculoskeletal: He exhibits no edema or tenderness.   Neurological: No cranial nerve deficit. He exhibits normal muscle tone.   Skin: Skin is warm. No rash noted.   Psychiatric: He has a normal mood and affect.         CRANIAL NERVES     CN III, IV, VI   Pupils are equal, round, and reactive to light.  Extraocular motions are normal.        Significant Labs:   CBC:   Recent Labs  Lab 02/09/18  0706   WBC 5.37   HGB 13.4*   HCT 40.8        CMP:   Recent Labs  Lab 02/09/18  0706   *   K 4.3      CO2 26   GLU 87   BUN 11   CREATININE 1.1   CALCIUM 9.7   PROT 7.4   ALBUMIN 3.7   BILITOT 0.7   ALKPHOS 79   AST 26    ALT 30   ANIONGAP 7*   EGFRNONAA >60.0     Cardiac Markers:   Recent Labs  Lab 02/09/18  0706   *     Magnesium:   Recent Labs  Lab 02/09/18  0706   MG 2.0     Troponin:   Recent Labs  Lab 02/09/18  0706   TROPONINI 0.373*       Significant Imaging: I have reviewed all pertinent imaging results/findings within the past 24 hours.   Echo CONCLUSIONS     1 - Mild left ventricular enlargement.     2 - Moderately depressed left ventricular systolic function (EF 35-40%).     3 - Eccentric hypertrophy.     4 - Wall motion abnormalities.     5 - Biatrial enlargement.     6 - Low normal to mildly depressed right ventricular systolic function .     7 - Trivial aortic regurgitation.     8 - Mitral valve prosthesis.     9 - Trivial mitral regurgitation.     10 - Mild tricuspid regurgitation.     11 - Trivial pulmonic regurgitation.     12 - Pulmonary hypertension. The estimated PA systolic pressure is 44 mmHg.         Assessment/Plan:     * AICD lead malfunction    - Patient with R ventricular lead malfunction.  - EP consult- appreciate recs  - Currently patient is asymptomatic/stable, continue to monitor            Heart block    - Patient with heart block as seen by cardiology at OSH.   - EP consulted- appreciate recs  - Echo ordered- results as above.  - Treat with amiodarone and toprol XL  - Currently patient is asymptomatic/stable, continue to monitor          Chronic systolic heart failure    - Patient is asymptomatic and stable  - Treating with home meds- Lasix 40 BID, Toprol, Digoxin, Isosorbide dinitrate,   - Continue to monitor            Elevated troponin    - Patient without CP but with troponin .37, which is less than outside hospital, where he presented with troponin of .55.  - EKG ordered  - Patient asymptomatic, monitor          Essential hypertension    - Patient is asymptomatic and stable  - Treating with home meds- Toprol, Digoxin, Isosorbide dinitrate,   - Continue to monitor            HLD  (hyperlipidemia)    - Patient is asymptomatic and stable  - Treating with home meds- lipitor   - Continue to monitor            Esophageal erosions    - Patient is asymptomatic and stable  - Continue to monitor            CAD (coronary artery disease) of artery bypass graft    - Patient with CABG in 2014  - Currently asymptomatic          Chronic stable angina    - Patient is asymptomatic and stable  - Treating with home meds- ranolazine and nitroglycerin as needed  - Continue to monitor            Chronic anticoagulation    - Treat with xarelto            VTE Risk Mitigation         Ordered     rivaroxaban tablet 20 mg  Daily     Route:  Oral        02/09/18 0713     High Risk of VTE  Once      02/09/18 0713             Humberto Hernandez MD  Department of Hospital Medicine   [facility]

## 2018-02-10 PROBLEM — I48.91 ATRIAL FIBRILLATION: Status: ACTIVE | Noted: 2018-02-10

## 2018-02-10 PROBLEM — I47.29 VENTRICULAR TACHYCARDIA, MONOMORPHIC: Status: ACTIVE | Noted: 2018-02-10

## 2018-02-10 LAB
ALBUMIN SERPL BCP-MCNC: 3.5 G/DL
ALP SERPL-CCNC: 80 U/L
ALT SERPL W/O P-5'-P-CCNC: 29 U/L
ANION GAP SERPL CALC-SCNC: 5 MMOL/L
AST SERPL-CCNC: 22 U/L
BASOPHILS # BLD AUTO: 0.02 K/UL
BASOPHILS NFR BLD: 0.4 %
BILIRUB SERPL-MCNC: 0.6 MG/DL
BUN SERPL-MCNC: 11 MG/DL
CALCIUM SERPL-MCNC: 9.7 MG/DL
CHLORIDE SERPL-SCNC: 102 MMOL/L
CO2 SERPL-SCNC: 29 MMOL/L
CREAT SERPL-MCNC: 1 MG/DL
DIFFERENTIAL METHOD: ABNORMAL
EOSINOPHIL # BLD AUTO: 0.2 K/UL
EOSINOPHIL NFR BLD: 3.2 %
ERYTHROCYTE [DISTWIDTH] IN BLOOD BY AUTOMATED COUNT: 13.6 %
EST. GFR  (AFRICAN AMERICAN): >60 ML/MIN/1.73 M^2
EST. GFR  (NON AFRICAN AMERICAN): >60 ML/MIN/1.73 M^2
GLUCOSE SERPL-MCNC: 109 MG/DL
HCT VFR BLD AUTO: 39.4 %
HGB BLD-MCNC: 12.9 G/DL
IMM GRANULOCYTES # BLD AUTO: 0.03 K/UL
IMM GRANULOCYTES NFR BLD AUTO: 0.6 %
LYMPHOCYTES # BLD AUTO: 1.7 K/UL
LYMPHOCYTES NFR BLD: 35.8 %
MAGNESIUM SERPL-MCNC: 2.2 MG/DL
MCH RBC QN AUTO: 31.2 PG
MCHC RBC AUTO-ENTMCNC: 32.7 G/DL
MCV RBC AUTO: 95 FL
MONOCYTES # BLD AUTO: 0.6 K/UL
MONOCYTES NFR BLD: 12.6 %
NEUTROPHILS # BLD AUTO: 2.2 K/UL
NEUTROPHILS NFR BLD: 47.4 %
NRBC BLD-RTO: 0 /100 WBC
PHOSPHATE SERPL-MCNC: 3.3 MG/DL
PLATELET # BLD AUTO: 225 K/UL
PMV BLD AUTO: 10.4 FL
POTASSIUM SERPL-SCNC: 4 MMOL/L
PROT SERPL-MCNC: 7 G/DL
RBC # BLD AUTO: 4.13 M/UL
SODIUM SERPL-SCNC: 136 MMOL/L
WBC # BLD AUTO: 4.67 K/UL

## 2018-02-10 PROCEDURE — 85025 COMPLETE CBC W/AUTO DIFF WBC: CPT

## 2018-02-10 PROCEDURE — 84100 ASSAY OF PHOSPHORUS: CPT

## 2018-02-10 PROCEDURE — 25000003 PHARM REV CODE 250: Performed by: STUDENT IN AN ORGANIZED HEALTH CARE EDUCATION/TRAINING PROGRAM

## 2018-02-10 PROCEDURE — 99231 SBSQ HOSP IP/OBS SF/LOW 25: CPT | Mod: GC,,, | Performed by: HOSPITALIST

## 2018-02-10 PROCEDURE — 36415 COLL VENOUS BLD VENIPUNCTURE: CPT

## 2018-02-10 PROCEDURE — 20600001 HC STEP DOWN PRIVATE ROOM

## 2018-02-10 PROCEDURE — 83735 ASSAY OF MAGNESIUM: CPT

## 2018-02-10 PROCEDURE — 80053 COMPREHEN METABOLIC PANEL: CPT

## 2018-02-10 RX ADMIN — DIGOXIN 250 MCG: 250 TABLET ORAL at 08:02

## 2018-02-10 RX ADMIN — SACUBITRIL AND VALSARTAN 1 TABLET: 49; 51 TABLET, FILM COATED ORAL at 08:02

## 2018-02-10 RX ADMIN — AMIODARONE HYDROCHLORIDE 200 MG: 200 TABLET ORAL at 08:02

## 2018-02-10 RX ADMIN — ISOSORBIDE MONONITRATE 30 MG: 30 TABLET, EXTENDED RELEASE ORAL at 08:02

## 2018-02-10 RX ADMIN — RANOLAZINE 500 MG: 500 TABLET, FILM COATED, EXTENDED RELEASE ORAL at 09:02

## 2018-02-10 RX ADMIN — FUROSEMIDE 40 MG: 40 TABLET ORAL at 09:02

## 2018-02-10 RX ADMIN — FUROSEMIDE 40 MG: 40 TABLET ORAL at 08:02

## 2018-02-10 RX ADMIN — ATORVASTATIN CALCIUM 40 MG: 20 TABLET, FILM COATED ORAL at 08:02

## 2018-02-10 RX ADMIN — RANOLAZINE 500 MG: 500 TABLET, FILM COATED, EXTENDED RELEASE ORAL at 08:02

## 2018-02-10 RX ADMIN — METOPROLOL SUCCINATE 12.5 MG: 25 TABLET, EXTENDED RELEASE ORAL at 08:02

## 2018-02-10 RX ADMIN — SACUBITRIL AND VALSARTAN 1 TABLET: 49; 51 TABLET, FILM COATED ORAL at 09:02

## 2018-02-10 RX ADMIN — AMIODARONE HYDROCHLORIDE 200 MG: 200 TABLET ORAL at 09:02

## 2018-02-10 NOTE — ASSESSMENT & PLAN NOTE
- S/p ICD needing revision.  - HR remains stable during hospitalization.   - On cardiac monitoring and if unable to get lead revision on 2/12, will schedule with outpatient life vest and follow-up with EP.

## 2018-02-10 NOTE — PLAN OF CARE
Cardiothoracic Surgery    If there is a 3rd adult CT surgery OR team available on Monday, then Dr. Kumar is available to serve as CTS backup staff coverage.  If there is not a 3rd adult team available on Monday, then the lead extraction will need to be coordinated as an outpatient.    Dylon Rodas MD  Thoracic Surgery Resident, PGY6  Cardiothoracic Surgery  [facility]

## 2018-02-10 NOTE — ASSESSMENT & PLAN NOTE
- ChadVASC 2- Now with paced rhythm  - Holding Eloquis while awaiting for lead extraction on 2/12.

## 2018-02-10 NOTE — ASSESSMENT & PLAN NOTE
- Patient is asymptomatic and stable. Treating with home meds- Lasix 40 BID, Toprol, Digoxin, Isosorbide dinitrate.

## 2018-02-10 NOTE — ASSESSMENT & PLAN NOTE
- Patient without CP but with troponin .37, which is less than outside hospital, where he presented with troponin of .55.  - EKG with AV paced rhythm.   - Patient asymptomatic, monitor

## 2018-02-10 NOTE — ASSESSMENT & PLAN NOTE
- History of monomorphic vtach with history of ICD/pacermaker  - Transfer from [facility] where interrogation of device significant for malfunction of the pacing portion of the right ventricular lead resulting in noise on the RV lead channel.  - EP seeing patient today who state patient will need ICD/LV lead extraction and re-implant; CTS will need to be on for back-up for procedure - tentatively scheduled on Monday 2/12.   - Patient is clinically stable and awaiting procedure at that time. If unable to be done inpatient, patient will be fitted for life vest and return for outpatient follow-up with EP.  - Currently patient is asymptomatic/stable, continue to monitor

## 2018-02-10 NOTE — PLAN OF CARE
Problem: Patient Care Overview  Goal: Plan of Care Review  Outcome: Ongoing (interventions implemented as appropriate)  Patient remains free of falls or injury. Patient denies pain. 2D echo completed; EF 38%. CTS and EP c/s. Plan for NITA and lead revision on Monday; holding xarelto at this time. Plan of care reviewed with patient.

## 2018-02-10 NOTE — PLAN OF CARE
Problem: Patient Care Overview  Goal: Plan of Care Review  Outcome: Ongoing (interventions implemented as appropriate)  Patient free from falls/injury. Patient had no complaints of discomfort. Pt has a chest xray. Will cont to monitor

## 2018-02-10 NOTE — SUBJECTIVE & OBJECTIVE
Interval History:   No acute events overnight. Patient denies chest pain, shortness of breath, palpitations. Without fevers or chills.     Review of Systems   Constitutional: Positive for activity change. Negative for chills, fever and unexpected weight change.   HENT: Negative for hearing loss.    Eyes: Negative for visual disturbance.   Respiratory: Negative for cough, shortness of breath and wheezing.    Cardiovascular: Negative for chest pain, palpitations and leg swelling.   Gastrointestinal: Negative for abdominal pain, blood in stool, constipation, diarrhea, nausea and vomiting.   Genitourinary: Negative for dysuria and hematuria.   Musculoskeletal: Negative for arthralgias.   Skin: Negative for rash.   Neurological: Negative for dizziness, tremors, seizures, syncope, weakness and headaches.   Hematological: Negative for adenopathy. Does not bruise/bleed easily.     Objective:     Vital Signs (Most Recent):  Temp: 98 °F (36.7 °C) (02/10/18 0848)  Pulse: 79 (02/10/18 0848)  Resp: 18 (02/10/18 0848)  BP: 105/82 (02/10/18 0848)  SpO2: 95 % (02/10/18 0848) Vital Signs (24h Range):  Temp:  [98 °F (36.7 °C)-98.3 °F (36.8 °C)] 98 °F (36.7 °C)  Pulse:  [77-83] 79  Resp:  [15-24] 18  SpO2:  [91 %-96 %] 95 %  BP: ()/(65-88) 105/82     Weight: 105.2 kg (231 lb 14.8 oz)  Body mass index is 34.25 kg/m².    Intake/Output Summary (Last 24 hours) at 02/10/18 1039  Last data filed at 02/10/18 0600   Gross per 24 hour   Intake              600 ml   Output              225 ml   Net              375 ml      Physical Exam   Constitutional: He appears well-developed and well-nourished.   HENT:   Head: Normocephalic and atraumatic.   Eyes: EOM are normal. Pupils are equal, round, and reactive to light.   Neck: No JVD present. No tracheal deviation present. No thyromegaly present.   Cardiovascular: Normal rate, regular rhythm and normal heart sounds.    No murmur heard.  Pulmonary/Chest: Effort normal and breath sounds normal.  He has no wheezes. He has no rales.   Abdominal: Soft. He exhibits no distension. There is no tenderness. No hernia.   Musculoskeletal: He exhibits no edema or tenderness.   Neurological: No cranial nerve deficit. He exhibits normal muscle tone.   Skin: Skin is warm. No rash noted.   Psychiatric: He has a normal mood and affect.       Significant Labs:   BMP:   Recent Labs  Lab 02/10/18  0645         K 4.0      CO2 29   BUN 11   CREATININE 1.0   CALCIUM 9.7   MG 2.2     CBC:   Recent Labs  Lab 02/09/18  0706 02/10/18  0645   WBC 5.37 4.67   HGB 13.4* 12.9*   HCT 40.8 39.4*    225

## 2018-02-10 NOTE — PROGRESS NOTES
Pt has a short (about 2 sec) pause on telemetry. Patient asystematic. Md notified. No new orders given. Will cont to monitor

## 2018-02-10 NOTE — PROGRESS NOTES
[facility] Hospital Medicine  Progress Note    Patient Name: Quentin Mckeon  MRN: [id number]  Patient Class: IP- Inpatient   Admission Date: 2/9/2018  Length of Stay: 1 days  Attending Physician: Tre Can MD  Primary Care Provider: Primary Doctor [facility] Medicine Team: [facility] [place] 4 Darius Camara MD    Subjective:     Principal Problem:AICD lead malfunction    HPI:  Patient is a 62 yo M with DCM s/p bi V AICD, CABG 2014, HFrEF (EF 38, echo 8/2017), HTN, MV biprothetic valve repair, Afib/Aflutter, Vfib/Vtach, former EtOH abuse, tobacco use,  who presents with a ventricular lead that is not capturing intermittently. On 1/27/18 the patient was at home watching television on the side of his bed when he passed out. This came out of nowhere and he fell back onto the bed and the next thing he knew he was coming to which he estimated at just a few seconds. Patient did not lose continence or have any jerking movements he was aware of. While coming to he was a bit confused but realized something was wrong, called 911 and got transported to the hospital. While in the hospital, the patient had his leads interrogated and showed a V tach episode with appropriate shock. A CT head was negative done at this time. Once patient was at the hospital, there is a note from cardiology about chest pain radiating down his arm and him taking nitro with no relief, but patient denies this to me. Cardiology consultation at [place] noted that he has malfunction of the pacing portion of the right ventricular lead resulting in noise on the RV lead channel.  Pt has a heart block with intermittent non capture pauses of 2.2 seconds. This was deemed to be not leading to his syncope as that is a result of ventricular arrhythmia which they say it may be reasonable to consider an endocardial VT ablation with assistance of impella or IABP for recurrent hemodynamically unstable sustained V tach despite amiodarone therapy  requiring ICD shock for termination in the setting of chronic systolic dysfunction.   Patient became febrile on 1/31/18 and ID was consulted. They diagnosed pt with the flu while in hospital. BCx and UCx were negative and he was treated with tamiflu. Pt also had a TTE showinga mobile echogenic mass attached to his pacer wire. 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 Vanc and rocephin were also started, but stopped on 2/1/18 when it was determined that the vegetation was not an infection.      At [facility] 2/9/18 the patient has no complaints and is asymptomatic. He has recovered from the flu and is awaiting a consult with EP for R ventricular lead replacement in his AICD. Of note patient is former alcoholic and only other syncope episode was while he was drinking. He was at that time drinking 1/5 of elisabeth posadas per day. He has been sober for 1 year now.     Hospital Course:  2/9/2018: Admission date. EP contacted who state that there is RV lead dyfunction with plan of exchange and revision on Monday 1/12 with CTS back-up    Interval History:   No acute events overnight. Patient denies chest pain, shortness of breath, palpitations. Without fevers or chills.     Review of Systems   Constitutional: Positive for activity change. Negative for chills, fever and unexpected weight change.   HENT: Negative for hearing loss.    Eyes: Negative for visual disturbance.   Respiratory: Negative for cough, shortness of breath and wheezing.    Cardiovascular: Negative for chest pain, palpitations and leg swelling.   Gastrointestinal: Negative for abdominal pain, blood in stool, constipation, diarrhea, nausea and vomiting.   Genitourinary: Negative for dysuria and hematuria.   Musculoskeletal: Negative for arthralgias.   Skin: Negative for rash.   Neurological: Negative for dizziness, tremors, seizures, syncope, weakness and headaches.   Hematological: Negative for adenopathy. Does  not bruise/bleed easily.     Objective:     Vital Signs (Most Recent):  Temp: 98 °F (36.7 °C) (02/10/18 0848)  Pulse: 79 (02/10/18 0848)  Resp: 18 (02/10/18 0848)  BP: 105/82 (02/10/18 0848)  SpO2: 95 % (02/10/18 0848) Vital Signs (24h Range):  Temp:  [98 °F (36.7 °C)-98.3 °F (36.8 °C)] 98 °F (36.7 °C)  Pulse:  [77-83] 79  Resp:  [15-24] 18  SpO2:  [91 %-96 %] 95 %  BP: ()/(65-88) 105/82     Weight: 105.2 kg (231 lb 14.8 oz)  Body mass index is 34.25 kg/m².    Intake/Output Summary (Last 24 hours) at 02/10/18 1039  Last data filed at 02/10/18 0600   Gross per 24 hour   Intake              600 ml   Output              225 ml   Net              375 ml      Physical Exam   Constitutional: He appears well-developed and well-nourished.   HENT:   Head: Normocephalic and atraumatic.   Eyes: EOM are normal. Pupils are equal, round, and reactive to light.   Neck: No JVD present. No tracheal deviation present. No thyromegaly present.   Cardiovascular: Normal rate, regular rhythm and normal heart sounds.    No murmur heard.  Pulmonary/Chest: Effort normal and breath sounds normal. He has no wheezes. He has no rales.   Abdominal: Soft. He exhibits no distension. There is no tenderness. No hernia.   Musculoskeletal: He exhibits no edema or tenderness.   Neurological: No cranial nerve deficit. He exhibits normal muscle tone.   Skin: Skin is warm. No rash noted.   Psychiatric: He has a normal mood and affect.       Significant Labs:   BMP:   Recent Labs  Lab 02/10/18  0645         K 4.0      CO2 29   BUN 11   CREATININE 1.0   CALCIUM 9.7   MG 2.2     CBC:   Recent Labs  Lab 02/09/18  0706 02/10/18  0645   WBC 5.37 4.67   HGB 13.4* 12.9*   HCT 40.8 39.4*    225     Assessment/Plan:      * AICD lead malfunction    - History of monomorphic vtach with history of ICD/pacermaker  - Transfer [place] [facility] [place] where interrogation of device significant for malfunction of the pacing portion of the right  ventricular lead resulting in noise on the RV lead channel.  - EP seeing patient today who state patient will need ICD/LV lead extraction and re-implant; CTS will need to be on for back-up for procedure - tentatively scheduled on Monday 2/12.   - Patient is clinically stable and awaiting procedure at that time. If unable to be done inpatient, patient will be fitted for life vest and return for outpatient follow-up with EP.  - Currently patient is asymptomatic/stable, continue to monitor            Ventricular tachycardia, monomorphic    - S/p ICD needing revision.  - HR remains stable during hospitalization.   - On cardiac monitoring and if unable to get lead revision on 2/12, will schedule with outpatient life vest and follow-up with EP.           Elevated troponin    - Patient without CP but with troponin .37, which is less than outside hospital, where he presented with troponin of .55.  - EKG with AV paced rhythm.   - Patient asymptomatic, monitor          Heart block    - History of complete AV block, now with pacemaker.  - Defective CRT-D - with some sinus pauses on 2/9 but without them since.   - Currently patient is asymptomatic/stable, continue to monitor          Chronic systolic heart failure    - Patient is asymptomatic and stable. Treating with home meds- Lasix 40 BID, Toprol, Digoxin, Isosorbide dinitrate.            Chronic stable angina    - Patient is asymptomatic and stable  - Treating with home meds- ranolazine and nitroglycerin as needed  - Continue to monitor            CAD (coronary artery disease) of artery bypass graft    - Patient with CABG in 2014  - Currently asymptomatic          Essential hypertension    - Treating with home meds- Toprol, Digoxin, Isosorbide dinitrate with good control.             Atrial fibrillation    - ChadVASC 2- Now with paced rhythm  - Holding Eloquis while awaiting for lead extraction on 2/12.           Chronic anticoagulation    - Treat with xarelto           Esophageal erosions    - Patient is asymptomatic and stable  - Continue to monitor            HLD (hyperlipidemia)    - Continue home atorvastatin 40 mg PO daily.               VTE Risk Mitigation         Ordered     High Risk of VTE  Once      02/09/18 0713        Disposition: Patient asymptomatic and stable and awaiting lead revision and exchange on 2/12 with CTS backup, if available. If not, patient will be fitted with life vest and follow-up outpatient.     Suman Camara MD  PGY-2 Internal Medicine  [phone]    Department of Hospital Medicine   [facility]

## 2018-02-10 NOTE — ASSESSMENT & PLAN NOTE
- History of complete AV block, now with pacemaker.  - Defective CRT-D - with some sinus pauses on 2/9 but without them since.   - Currently patient is asymptomatic/stable, continue to monitor

## 2018-02-11 LAB
APTT BLDCRRT: 22.5 SEC
INR PPP: 1
PROTHROMBIN TIME: 10.3 SEC

## 2018-02-11 PROCEDURE — 20600001 HC STEP DOWN PRIVATE ROOM

## 2018-02-11 PROCEDURE — 85610 PROTHROMBIN TIME: CPT

## 2018-02-11 PROCEDURE — 85730 THROMBOPLASTIN TIME PARTIAL: CPT

## 2018-02-11 PROCEDURE — 25000003 PHARM REV CODE 250: Performed by: STUDENT IN AN ORGANIZED HEALTH CARE EDUCATION/TRAINING PROGRAM

## 2018-02-11 PROCEDURE — 99231 SBSQ HOSP IP/OBS SF/LOW 25: CPT | Mod: GC,,, | Performed by: HOSPITALIST

## 2018-02-11 PROCEDURE — 99231 SBSQ HOSP IP/OBS SF/LOW 25: CPT | Mod: GC,,, | Performed by: INTERNAL MEDICINE

## 2018-02-11 PROCEDURE — 36415 COLL VENOUS BLD VENIPUNCTURE: CPT

## 2018-02-11 RX ADMIN — RANOLAZINE 500 MG: 500 TABLET, FILM COATED, EXTENDED RELEASE ORAL at 09:02

## 2018-02-11 RX ADMIN — FUROSEMIDE 40 MG: 40 TABLET ORAL at 08:02

## 2018-02-11 RX ADMIN — ISOSORBIDE MONONITRATE 30 MG: 30 TABLET, EXTENDED RELEASE ORAL at 08:02

## 2018-02-11 RX ADMIN — FUROSEMIDE 40 MG: 40 TABLET ORAL at 09:02

## 2018-02-11 RX ADMIN — METOPROLOL SUCCINATE 12.5 MG: 25 TABLET, EXTENDED RELEASE ORAL at 08:02

## 2018-02-11 RX ADMIN — SACUBITRIL AND VALSARTAN 1 TABLET: 49; 51 TABLET, FILM COATED ORAL at 09:02

## 2018-02-11 RX ADMIN — RANOLAZINE 500 MG: 500 TABLET, FILM COATED, EXTENDED RELEASE ORAL at 08:02

## 2018-02-11 RX ADMIN — DIGOXIN 250 MCG: 250 TABLET ORAL at 08:02

## 2018-02-11 RX ADMIN — AMIODARONE HYDROCHLORIDE 200 MG: 200 TABLET ORAL at 09:02

## 2018-02-11 RX ADMIN — SACUBITRIL AND VALSARTAN 1 TABLET: 49; 51 TABLET, FILM COATED ORAL at 08:02

## 2018-02-11 RX ADMIN — ATORVASTATIN CALCIUM 40 MG: 20 TABLET, FILM COATED ORAL at 08:02

## 2018-02-11 RX ADMIN — AMIODARONE HYDROCHLORIDE 200 MG: 200 TABLET ORAL at 08:02

## 2018-02-11 NOTE — SUBJECTIVE & OBJECTIVE
Interval History: feeling well     Review of Systems   All other systems reviewed and are negative.    Objective:     Vital Signs (Most Recent):  Temp: 97.6 °F (36.4 °C) (02/10/18 2015)  Pulse: 80 (02/11/18 1050)  Resp: (!) 21 (02/11/18 0832)  BP: (!) 130/91 (02/11/18 0832)  SpO2: (!) 94 % (02/11/18 0832) Vital Signs (24h Range):  Temp:  [97.6 °F (36.4 °C)] 97.6 °F (36.4 °C)  Pulse:  [79-81] 80  Resp:  [17-22] 21  SpO2:  [90 %-97 %] 94 %  BP: ()/(71-91) 130/91     Weight: 105.2 kg (231 lb 14.8 oz)  Body mass index is 34.25 kg/m².     SpO2: (!) 94 %  O2 Device (Oxygen Therapy): room air    Physical Exam  GEN: Alert and oriented in NAD  NECK: no JVD appreciated   CVS: RRR, s1/s2  PULM: CTAB no rales  ABD: NT/ND BS +  Extremities: warm and dry, palpable pulses, no edema  NEURO: Alert and oriented x 3  PSYCH: appropriate affect.         Significant Labs: reviewed    Significant Imaging: reviewed

## 2018-02-11 NOTE — ASSESSMENT & PLAN NOTE
- TnI .37, on admission, suspect related to ICD discharge  - No chest pain, EKG with AV paced rhythm.   - Patient asymptomatic, monitor

## 2018-02-11 NOTE — PROGRESS NOTES
[facility]  Cardiac Electrophysiology  Progress Note    Admission Date: 2/9/2018  Code Status: Full Code   Attending Physician: Tre Can MD   Expected Discharge Date: 2/14/2018  Principal Problem:AICD lead malfunction    Subjective:     Interval History: feeling well     Review of Systems   All other systems reviewed and are negative.    Objective:     Vital Signs (Most Recent):  Temp: 97.6 °F (36.4 °C) (02/10/18 2015)  Pulse: 80 (02/11/18 1050)  Resp: (!) 21 (02/11/18 0832)  BP: (!) 130/91 (02/11/18 0832)  SpO2: (!) 94 % (02/11/18 0832) Vital Signs (24h Range):  Temp:  [97.6 °F (36.4 °C)] 97.6 °F (36.4 °C)  Pulse:  [79-81] 80  Resp:  [17-22] 21  SpO2:  [90 %-97 %] 94 %  BP: ()/(71-91) 130/91     Weight: 105.2 kg (231 lb 14.8 oz)  Body mass index is 34.25 kg/m².     SpO2: (!) 94 %  O2 Device (Oxygen Therapy): room air    Physical Exam  GEN: Alert and oriented in NAD  NECK: no JVD appreciated   CVS: RRR, s1/s2  PULM: CTAB no rales  ABD: NT/ND BS +  Extremities: warm and dry, palpable pulses, no edema  NEURO: Alert and oriented x 3  PSYCH: appropriate affect.         Significant Labs: reviewed    Significant Imaging: reviewed    Assessment and Plan:     * AICD lead malfunction    62 year old gentleman with a hx of CAD s/p CABG 2014, ICM 38%, CHB, BiV-ICD (biotronik), AF/AFL, HTN, HLD, MVR prosthetic who presented on 1/27 after a syncopal event. Device interrogated and he was noted to be in VT for which he received appropriate shock. Also had RV lead dysfunction and pauses on interrogation did not result in syncope. No LHC was done there but he had one in 08/2017 without any significant changes. He was started on Amiodarone 400 BID and transferred here for possible lead extraction vs VT ablation.     With likely lead fracture will need an extraction and lead revision which is planned for Monday. Have ordered type and screen and prepared 4 units of blood. Risks, benefits and  alternatives explained which he did understand and agrees to proceed.             Sean Gómez MD  Cardiac Electrophysiology  [facility]

## 2018-02-11 NOTE — ASSESSMENT & PLAN NOTE
- ChadVASC 2- Now with paced rhythm  - Holding rivaroxaban while awaiting for lead extraction on 2/12.

## 2018-02-11 NOTE — ASSESSMENT & PLAN NOTE
- Patient is asymptomatic and stable  - Continue home furosemide 40mg PO BID, Toprol 12.5, digoxin and ISMN 30

## 2018-02-11 NOTE — SUBJECTIVE & OBJECTIVE
Review of Systems   Constitutional: Negative for chills, fever and unexpected weight change.   Respiratory: Negative for cough, shortness of breath and wheezing.    Cardiovascular: Negative for chest pain, palpitations and leg swelling.   Gastrointestinal: Negative for abdominal pain, blood in stool, constipation, diarrhea, nausea and vomiting.   Genitourinary: Negative for dysuria and hematuria.   Neurological: Negative for dizziness, tremors, seizures, syncope, weakness and headaches.     Objective:     Vital Signs (Most Recent):  Temp: 97.6 °F (36.4 °C) (02/10/18 2015)  Pulse: 80 (02/11/18 0711)  Resp: (!) 21 (02/11/18 0700)  BP: 105/73 (02/11/18 0700)  SpO2: (!) 90 % (02/11/18 0700) Vital Signs (24h Range):  Temp:  [97.6 °F (36.4 °C)-98 °F (36.7 °C)] 97.6 °F (36.4 °C)  Pulse:  [79-81] 80  Resp:  [17-22] 21  SpO2:  [90 %-97 %] 90 %  BP: ()/(71-82) 105/73     Weight: 105.2 kg (231 lb 14.8 oz)  Body mass index is 34.25 kg/m².    Intake/Output Summary (Last 24 hours) at 02/11/18 0842  Last data filed at 02/11/18 0600   Gross per 24 hour   Intake             1680 ml   Output             2300 ml   Net             -620 ml      Physical Exam   Constitutional: He is oriented to person, place, and time. No distress.   Cardiovascular: Normal rate, regular rhythm and normal heart sounds.  Exam reveals no friction rub.    Pulmonary/Chest: Effort normal and breath sounds normal. He has no wheezes. He has no rales.   Abdominal: Soft. He exhibits no distension. There is no tenderness.   Musculoskeletal: He exhibits no edema or tenderness.   Neurological: He is alert and oriented to person, place, and time.   Skin: Skin is warm. He is not diaphoretic.   Psychiatric: He has a normal mood and affect.   Nursing note and vitals reviewed.      Significant Labs:   BMP:     Recent Labs  Lab 02/10/18  0645         K 4.0      CO2 29   BUN 11   CREATININE 1.0   CALCIUM 9.7   MG 2.2     CBC:     Recent Labs  Lab  02/10/18  0645   WBC 4.67   HGB 12.9*   HCT 39.4*

## 2018-02-11 NOTE — ASSESSMENT & PLAN NOTE
- History of monomorphic vtach with history of ICD/pacermaker  - Transfer from [facility] where interrogation of device significant for malfunction of the pacing portion of the right ventricular lead resulting in noise on the RV lead channel.  - EP planning for ICD/LV lead extraction and re-implant; CTS will need to be on for back-up for procedure - tentatively scheduled on Monday 2/12.   - Patient is clinically stable and awaiting procedure at that time. If unable to be done inpatient, patient will be fitted for life vest and return for outpatient follow-up with EP.  - Currently patient is asymptomatic/stable, continue to monitor

## 2018-02-11 NOTE — ASSESSMENT & PLAN NOTE
62 year old gentleman with a hx of CAD s/p CABG 2014, ICM 38%, CHB, BiV-ICD (biotronik), AF/AFL, HTN, HLD, MVR prosthetic who presented on 1/27 after a syncopal event. Device interrogated and he was noted to be in VT for which he received appropriate shock. Also had RV lead dysfunction and pauses on interrogation did not result in syncope. No LHC was done there but he had one in 08/2017 without any significant changes. He was started on Amiodarone 400 BID and transferred here for possible lead extraction vs VT ablation.     With likely lead fracture will need an extraction and lead revision which is planned for Monday. Have ordered type and screen and prepared 4 units of blood. Risks, benefits and alternatives explained which he did understand and agrees to proceed.

## 2018-02-11 NOTE — ASSESSMENT & PLAN NOTE
- Patient is asymptomatic and stable  - Treating with home meds- ranolazine, ISMN and nitroglycerin as needed  - Continue to monitor

## 2018-02-11 NOTE — PROGRESS NOTES
[facility] Hospital Medicine  Progress Note    Patient Name: Quentin Mckeon  MRN: [id number]  Patient Class: IP- Inpatient   Admission Date: 2/9/2018  Length of Stay: 2 days  Attending Physician: Tre Can MD  Primary Care Provider: Primary Doctor [facility] Medicine Team: [facility] [place] [facility] MED 4 Steffany Gibbs MD    Subjective:     Principal Problem:AICD lead malfunction    HPI:  Patient is a 64 yo M with DCM s/p bi V AICD, CABG 2014, HFrEF (EF 38, echo 8/2017), HTN, MV biprothetic valve repair, Afib/Aflutter, Vfib/Vtach, former EtOH abuse, tobacco use,  who presents with a ventricular lead that is not capturing intermittently. On 1/27/18 the patient was at home watching television on the side of his bed when he passed out. This came out of nowhere and he fell back onto the bed and the next thing he knew he was coming to which he estimated at just a few seconds. Patient did not lose continence or have any jerking movements he was aware of. While coming to he was a bit confused but realized something was wrong, called 911 and got transported to the hospital. While in the hospital, the patient had his leads interrogated and showed a V tach episode with appropriate shock. A CT head was negative done at this time. Once patient was at the hospital, there is a note from cardiology about chest pain radiating down his arm and him taking nitro with no relief, but patient denies this to me. Cardiology consultation at [place] noted that he has malfunction of the pacing portion of the right ventricular lead resulting in noise on the RV lead channel.  Pt has a heart block with intermittent non capture pauses of 2.2 seconds. This was deemed to be not leading to his syncope as that is a result of ventricular arrhythmia which they say it may be reasonable to consider an endocardial VT ablation with assistance of impella or IABP for recurrent hemodynamically unstable sustained V tach despite amiodarone therapy  requiring ICD shock for termination in the setting of chronic systolic dysfunction.   Patient became febrile on 1/31/18 and ID was consulted. They diagnosed pt with the flu while in hospital. BCx and UCx were negative and he was treated with tamiflu. Pt also had a TTE showinga mobile echogenic mass attached to his pacer wire. 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 Vanc and rocephin were also started, but stopped on 2/1/18 when it was determined that the vegetation was not an infection.      At [facility] [place] 2/9/18 the patient has no complaints and is asymptomatic. He has recovered from the flu and is awaiting a consult with EP for R ventricular lead replacement in his AICD. Of note patient is former alcoholic and only other syncope episode was while he was drinking. He was at that time drinking 1/5 of elisabeth posadas per day. He has been sober for 1 year now.     Hospital Course:  2/9/2018: Admission date. EP contacted who state that there is RV lead dyfunction with plan of exchange and revision on Monday 1/12 with CTS back-up  02/10/2018: No acute events overnight. Patient denies chest pain, shortness of breath, palpitations. Without fevers or chills.   02/11/2018: NAEON. Awaiting possible procedure Monday. Denies chest pain, SOB, palpitations.       Review of Systems   Constitutional: Negative for chills, fever and unexpected weight change.   Respiratory: Negative for cough, shortness of breath and wheezing.    Cardiovascular: Negative for chest pain, palpitations and leg swelling.   Gastrointestinal: Negative for abdominal pain, blood in stool, constipation, diarrhea, nausea and vomiting.   Genitourinary: Negative for dysuria and hematuria.   Neurological: Negative for dizziness, tremors, seizures, syncope, weakness and headaches.     Objective:     Vital Signs (Most Recent):  Temp: 97.6 °F (36.4 °C) (02/10/18 2015)  Pulse: 80 (02/11/18 0711)  Resp: (!) 21 (02/11/18  0700)  BP: 105/73 (02/11/18 0700)  SpO2: (!) 90 % (02/11/18 0700) Vital Signs (24h Range):  Temp:  [97.6 °F (36.4 °C)-98 °F (36.7 °C)] 97.6 °F (36.4 °C)  Pulse:  [79-81] 80  Resp:  [17-22] 21  SpO2:  [90 %-97 %] 90 %  BP: ()/(71-82) 105/73     Weight: 105.2 kg (231 lb 14.8 oz)  Body mass index is 34.25 kg/m².    Intake/Output Summary (Last 24 hours) at 02/11/18 0842  Last data filed at 02/11/18 0600   Gross per 24 hour   Intake             1680 ml   Output             2300 ml   Net             -620 ml      Physical Exam   Constitutional: He is oriented to person, place, and time. No distress.   Cardiovascular: Normal rate, regular rhythm and normal heart sounds.  Exam reveals no friction rub.    Pulmonary/Chest: Effort normal and breath sounds normal. He has no wheezes. He has no rales.   Abdominal: Soft. He exhibits no distension. There is no tenderness.   Musculoskeletal: He exhibits no edema or tenderness.   Neurological: He is alert and oriented to person, place, and time.   Skin: Skin is warm. He is not diaphoretic.   Psychiatric: He has a normal mood and affect.   Nursing note and vitals reviewed.      Significant Labs:   BMP:     Recent Labs  Lab 02/10/18  0645         K 4.0      CO2 29   BUN 11   CREATININE 1.0   CALCIUM 9.7   MG 2.2     CBC:     Recent Labs  Lab 02/10/18  0645   WBC 4.67   HGB 12.9*   HCT 39.4*        Assessment/Plan:      * AICD lead malfunction    - History of monomorphic vtach with history of ICD/pacermaker  - Transfer from [facility] where interrogation of device significant for malfunction of the pacing portion of the right ventricular lead resulting in noise on the RV lead channel.  - EP planning for ICD/LV lead extraction and re-implant; CTS will need to be on for back-up for procedure - tentatively scheduled on Monday 2/12.   - Patient is clinically stable and awaiting procedure at that time. If unable to be done inpatient, patient will be  fitted for life vest and return for outpatient follow-up with EP.  - Currently patient is asymptomatic/stable, continue to monitor        Atrial fibrillation    - ChadVASC 2- Now with paced rhythm  - Holding rivaroxaban while awaiting for lead extraction on 2/12.         Ventricular tachycardia, monomorphic    - S/p ICD needing revision.  - HR remains stable during hospitalization.   - On cardiac monitoring and if unable to get lead revision on 2/12, will schedule with outpatient life vest and follow-up with EP.        Chronic stable angina    - Patient is asymptomatic and stable  - Treating with home meds- ranolazine, ISMN and nitroglycerin as needed  - Continue to monitor        Chronic anticoagulation    Please see A fib        CAD (coronary artery disease) of artery bypass graft    - Patient with CABG in 2014  - Currently asymptomatic        Chronic systolic heart failure    - Patient is asymptomatic and stable  - Continue home furosemide 40mg PO BID, Toprol 12.5, digoxin and ISMN 30        Elevated troponin    - TnI .37, on admission, suspect related to ICD discharge  - No chest pain, EKG with AV paced rhythm.   - Patient asymptomatic, monitor        Esophageal erosions    - Patient is asymptomatic and stable  - Continue to monitor        HLD (hyperlipidemia)    - Continue home atorvastatin 40 mg PO daily        Essential hypertension    - Continue home Toprol, digoxin, ISMN        Heart block    - History of complete AV block, now with pacemaker.  - Defective CRT-D - with some sinus pauses on 2/9 but without them since.   - Currently patient is asymptomatic/stable, continue to monitor          VTE Risk Mitigation         Ordered     High Risk of VTE  Once      02/11/18 0856     Place PARAMJIT lynnee  Until discontinued      02/11/18 0856              Steffany Gibbs MD  Department of Hospital Medicine   [facility]

## 2018-02-12 ENCOUNTER — ANESTHESIA (OUTPATIENT)
Dept: MEDSURG UNIT | Facility: HOSPITAL | Age: 63
DRG: 227 | End: 2018-02-12
Payer: MEDICARE

## 2018-02-12 ENCOUNTER — SURGERY (OUTPATIENT)
Age: 63
End: 2018-02-12

## 2018-02-12 ENCOUNTER — ANESTHESIA (OUTPATIENT)
Dept: MEDSURG UNIT | Facility: HOSPITAL | Age: 63
End: 2018-02-12

## 2018-02-12 LAB
ABO + RH BLD: NORMAL
ALBUMIN SERPL BCP-MCNC: 3.5 G/DL
ALP SERPL-CCNC: 72 U/L
ALT SERPL W/O P-5'-P-CCNC: 29 U/L
ANION GAP SERPL CALC-SCNC: 10 MMOL/L
AORTIC ATHEROMA: YES
AORTIC VALVE REGURGITATION: ABNORMAL
AST SERPL-CCNC: 25 U/L
BASOPHILS # BLD AUTO: 0.02 K/UL
BASOPHILS NFR BLD: 0.4 %
BILIRUB SERPL-MCNC: 0.7 MG/DL
BLD GP AB SCN CELLS X3 SERPL QL: NORMAL
BUN SERPL-MCNC: 11 MG/DL
CALCIUM SERPL-MCNC: 10.1 MG/DL
CHLORIDE SERPL-SCNC: 102 MMOL/L
CO2 SERPL-SCNC: 24 MMOL/L
CREAT SERPL-MCNC: 0.9 MG/DL
DIFFERENTIAL METHOD: ABNORMAL
EOSINOPHIL # BLD AUTO: 0.2 K/UL
EOSINOPHIL NFR BLD: 3.3 %
ERYTHROCYTE [DISTWIDTH] IN BLOOD BY AUTOMATED COUNT: 13.3 %
EST. GFR  (AFRICAN AMERICAN): >60 ML/MIN/1.73 M^2
EST. GFR  (NON AFRICAN AMERICAN): >60 ML/MIN/1.73 M^2
FACT X PPP CHRO-ACNC: <0.1 IU/ML
GLUCOSE SERPL-MCNC: 88 MG/DL
HCT VFR BLD AUTO: 38.6 %
HGB BLD-MCNC: 12.8 G/DL
IMM GRANULOCYTES # BLD AUTO: 0.02 K/UL
IMM GRANULOCYTES NFR BLD AUTO: 0.4 %
LYMPHOCYTES # BLD AUTO: 1.9 K/UL
LYMPHOCYTES NFR BLD: 39.2 %
MAGNESIUM SERPL-MCNC: 1.9 MG/DL
MCH RBC QN AUTO: 31.3 PG
MCHC RBC AUTO-ENTMCNC: 33.2 G/DL
MCV RBC AUTO: 94 FL
MITRAL VALVE REGURGITATION: ABNORMAL
MONOCYTES # BLD AUTO: 0.6 K/UL
MONOCYTES NFR BLD: 12.7 %
NEUTROPHILS # BLD AUTO: 2.1 K/UL
NEUTROPHILS NFR BLD: 44 %
NRBC BLD-RTO: 0 /100 WBC
PLATELET # BLD AUTO: 197 K/UL
PMV BLD AUTO: 10.2 FL
POCT GLUCOSE: 118 MG/DL (ref 70–110)
POTASSIUM SERPL-SCNC: 3.9 MMOL/L
PROT SERPL-MCNC: 7.1 G/DL
RBC # BLD AUTO: 4.09 M/UL
RETIRED EF AND QEF - SEE NOTES: 35 (ref 55–65)
SODIUM SERPL-SCNC: 136 MMOL/L
TRICUSPID VALVE REGURGITATION: ABNORMAL
WBC # BLD AUTO: 4.79 K/UL

## 2018-02-12 PROCEDURE — 20600001 HC STEP DOWN PRIVATE ROOM

## 2018-02-12 PROCEDURE — 33249 INSJ/RPLCMT DEFIB W/LEAD(S): CPT | Mod: ,,, | Performed by: INTERNAL MEDICINE

## 2018-02-12 PROCEDURE — C1894 INTRO/SHEATH, NON-LASER: HCPCS | Performed by: NURSE ANESTHETIST, CERTIFIED REGISTERED

## 2018-02-12 PROCEDURE — 36620 INSERTION CATHETER ARTERY: CPT | Mod: 59,,, | Performed by: ANESTHESIOLOGY

## 2018-02-12 PROCEDURE — 25000003 PHARM REV CODE 250: Performed by: STUDENT IN AN ORGANIZED HEALTH CARE EDUCATION/TRAINING PROGRAM

## 2018-02-12 PROCEDURE — 02HK3KZ INSERTION OF DEFIBRILLATOR LEAD INTO RIGHT VENTRICLE, PERCUTANEOUS APPROACH: ICD-10-PCS | Performed by: INTERNAL MEDICINE

## 2018-02-12 PROCEDURE — 27000239 HC STAND-BY BYPASS PUMP

## 2018-02-12 PROCEDURE — 37000009 HC ANESTHESIA EA ADD 15 MINS: Performed by: INTERNAL MEDICINE

## 2018-02-12 PROCEDURE — 63600175 PHARM REV CODE 636 W HCPCS: Performed by: NURSE ANESTHETIST, CERTIFIED REGISTERED

## 2018-02-12 PROCEDURE — 85520 HEPARIN ASSAY: CPT

## 2018-02-12 PROCEDURE — 99232 SBSQ HOSP IP/OBS MODERATE 35: CPT | Mod: GC,,, | Performed by: HOSPITALIST

## 2018-02-12 PROCEDURE — 93005 ELECTROCARDIOGRAM TRACING: CPT

## 2018-02-12 PROCEDURE — 93312 ECHO TRANSESOPHAGEAL: CPT | Mod: 26,,, | Performed by: INTERNAL MEDICINE

## 2018-02-12 PROCEDURE — 63600175 PHARM REV CODE 636 W HCPCS: Performed by: ANESTHESIOLOGY

## 2018-02-12 PROCEDURE — 27100025 HC TUBING, SET FLUID WARMER: Performed by: NURSE ANESTHETIST, CERTIFIED REGISTERED

## 2018-02-12 PROCEDURE — 93325 DOPPLER ECHO COLOR FLOW MAPG: CPT

## 2018-02-12 PROCEDURE — 93320 DOPPLER ECHO COMPLETE: CPT | Mod: 26,,, | Performed by: INTERNAL MEDICINE

## 2018-02-12 PROCEDURE — 63600175 PHARM REV CODE 636 W HCPCS: Performed by: NURSE PRACTITIONER

## 2018-02-12 PROCEDURE — 93010 ELECTROCARDIOGRAM REPORT: CPT | Mod: ,,, | Performed by: INTERNAL MEDICINE

## 2018-02-12 PROCEDURE — D9220A PRA ANESTHESIA: Mod: ANES,,, | Performed by: ANESTHESIOLOGY

## 2018-02-12 PROCEDURE — C1730 CATH, EP, 19 OR FEW ELECT: HCPCS

## 2018-02-12 PROCEDURE — 0JPT0PZ REMOVAL OF CARDIAC RHYTHM RELATED DEVICE FROM TRUNK SUBCUTANEOUS TISSUE AND FASCIA, OPEN APPROACH: ICD-10-PCS | Performed by: INTERNAL MEDICINE

## 2018-02-12 PROCEDURE — 37000008 HC ANESTHESIA 1ST 15 MINUTES: Performed by: INTERNAL MEDICINE

## 2018-02-12 PROCEDURE — 25000003 PHARM REV CODE 250: Performed by: NURSE ANESTHETIST, CERTIFIED REGISTERED

## 2018-02-12 PROCEDURE — 25500020 PHARM REV CODE 255

## 2018-02-12 PROCEDURE — 36415 COLL VENOUS BLD VENIPUNCTURE: CPT

## 2018-02-12 PROCEDURE — 02H63KZ INSERTION OF DEFIBRILLATOR LEAD INTO RIGHT ATRIUM, PERCUTANEOUS APPROACH: ICD-10-PCS | Performed by: INTERNAL MEDICINE

## 2018-02-12 PROCEDURE — 27201037 HC PRESSURE MONITORING SET UP

## 2018-02-12 PROCEDURE — C1899 LEAD, PMKR/AICD COMBINATION: HCPCS

## 2018-02-12 PROCEDURE — 25000003 PHARM REV CODE 250

## 2018-02-12 PROCEDURE — 33244 REMOVE ELCTRD TRANSVENOUSLY: CPT | Mod: 53,22,, | Performed by: INTERNAL MEDICINE

## 2018-02-12 PROCEDURE — 25500020 PHARM REV CODE 255: Performed by: NURSE ANESTHETIST, CERTIFIED REGISTERED

## 2018-02-12 PROCEDURE — 27200677 HC TRANSDUCER MONITOR KIT SINGLE: Performed by: NURSE ANESTHETIST, CERTIFIED REGISTERED

## 2018-02-12 PROCEDURE — 94799 UNLISTED PULMONARY SVC/PX: CPT

## 2018-02-12 PROCEDURE — 80053 COMPREHEN METABOLIC PANEL: CPT

## 2018-02-12 PROCEDURE — 0JH609Z INSERTION OF CARDIAC RESYNCHRONIZATION DEFIBRILLATOR PULSE GENERATOR INTO CHEST SUBCUTANEOUS TISSUE AND FASCIA, OPEN APPROACH: ICD-10-PCS | Performed by: INTERNAL MEDICINE

## 2018-02-12 PROCEDURE — 93325 DOPPLER ECHO COLOR FLOW MAPG: CPT | Mod: 26,,, | Performed by: INTERNAL MEDICINE

## 2018-02-12 PROCEDURE — 02PA3MZ REMOVAL OF CARDIAC LEAD FROM HEART, PERCUTANEOUS APPROACH: ICD-10-PCS | Performed by: INTERNAL MEDICINE

## 2018-02-12 PROCEDURE — 83735 ASSAY OF MAGNESIUM: CPT

## 2018-02-12 PROCEDURE — 63600175 PHARM REV CODE 636 W HCPCS

## 2018-02-12 PROCEDURE — D9220A PRA ANESTHESIA: Mod: CRNA,,, | Performed by: NURSE ANESTHETIST, CERTIFIED REGISTERED

## 2018-02-12 PROCEDURE — 86901 BLOOD TYPING SEROLOGIC RH(D): CPT

## 2018-02-12 PROCEDURE — 02HL3KZ INSERTION OF DEFIBRILLATOR LEAD INTO LEFT VENTRICLE, PERCUTANEOUS APPROACH: ICD-10-PCS | Performed by: INTERNAL MEDICINE

## 2018-02-12 PROCEDURE — 27201642 EP LAB PROCEDURE

## 2018-02-12 PROCEDURE — 85025 COMPLETE CBC W/AUTO DIFF WBC: CPT

## 2018-02-12 PROCEDURE — 82962 GLUCOSE BLOOD TEST: CPT | Performed by: INTERNAL MEDICINE

## 2018-02-12 RX ORDER — DOPAMINE HYDROCHLORIDE 160 MG/100ML
INJECTION, SOLUTION INTRAVENOUS CONTINUOUS PRN
Status: DISCONTINUED | OUTPATIENT
Start: 2018-02-12 | End: 2018-02-12

## 2018-02-12 RX ORDER — IODIXANOL 320 MG/ML
INJECTION, SOLUTION INTRAVASCULAR
Status: DISCONTINUED | OUTPATIENT
Start: 2018-02-12 | End: 2018-02-12

## 2018-02-12 RX ORDER — INSULIN ASPART 100 [IU]/ML
0-5 INJECTION, SOLUTION INTRAVENOUS; SUBCUTANEOUS EVERY 6 HOURS PRN
Status: DISCONTINUED | OUTPATIENT
Start: 2018-02-12 | End: 2018-02-13 | Stop reason: HOSPADM

## 2018-02-12 RX ORDER — ACETAMINOPHEN 10 MG/ML
1000 INJECTION, SOLUTION INTRAVENOUS EVERY 8 HOURS
Status: DISCONTINUED | OUTPATIENT
Start: 2018-02-12 | End: 2018-02-12

## 2018-02-12 RX ORDER — PHENYLEPHRINE HYDROCHLORIDE 10 MG/ML
INJECTION INTRAVENOUS
Status: DISCONTINUED | OUTPATIENT
Start: 2018-02-12 | End: 2018-02-12

## 2018-02-12 RX ORDER — KETAMINE HYDROCHLORIDE 100 MG/ML
INJECTION, SOLUTION INTRAMUSCULAR; INTRAVENOUS
Status: DISCONTINUED | OUTPATIENT
Start: 2018-02-12 | End: 2018-02-12

## 2018-02-12 RX ORDER — GLYCOPYRROLATE 0.2 MG/ML
INJECTION INTRAMUSCULAR; INTRAVENOUS
Status: DISCONTINUED | OUTPATIENT
Start: 2018-02-12 | End: 2018-02-12

## 2018-02-12 RX ORDER — SODIUM CHLORIDE 0.9 % (FLUSH) 0.9 %
3 SYRINGE (ML) INJECTION
Status: DISCONTINUED | OUTPATIENT
Start: 2018-02-12 | End: 2018-02-12 | Stop reason: HOSPADM

## 2018-02-12 RX ORDER — FENTANYL CITRATE 50 UG/ML
INJECTION, SOLUTION INTRAMUSCULAR; INTRAVENOUS
Status: DISCONTINUED | OUTPATIENT
Start: 2018-02-12 | End: 2018-02-12

## 2018-02-12 RX ORDER — ROCURONIUM BROMIDE 10 MG/ML
INJECTION, SOLUTION INTRAVENOUS
Status: DISCONTINUED | OUTPATIENT
Start: 2018-02-12 | End: 2018-02-12

## 2018-02-12 RX ORDER — HYDROCODONE BITARTRATE AND ACETAMINOPHEN 500; 5 MG/1; MG/1
TABLET ORAL
Status: DISCONTINUED | OUTPATIENT
Start: 2018-02-12 | End: 2018-02-13 | Stop reason: HOSPADM

## 2018-02-12 RX ORDER — CEFAZOLIN SODIUM 1 G/3ML
2 INJECTION, POWDER, FOR SOLUTION INTRAMUSCULAR; INTRAVENOUS
Status: COMPLETED | OUTPATIENT
Start: 2018-02-12 | End: 2018-02-13

## 2018-02-12 RX ORDER — CEFADROXIL 500 MG/1
500 CAPSULE ORAL EVERY 12 HOURS
Status: DISCONTINUED | OUTPATIENT
Start: 2018-02-13 | End: 2018-02-13 | Stop reason: HOSPADM

## 2018-02-12 RX ORDER — LIDOCAINE HYDROCHLORIDE 10 MG/ML
INJECTION, SOLUTION INTRAVENOUS
Status: DISCONTINUED | OUTPATIENT
Start: 2018-02-12 | End: 2018-02-12

## 2018-02-12 RX ORDER — MIDAZOLAM HYDROCHLORIDE 1 MG/ML
INJECTION, SOLUTION INTRAMUSCULAR; INTRAVENOUS
Status: DISCONTINUED | OUTPATIENT
Start: 2018-02-12 | End: 2018-02-12

## 2018-02-12 RX ORDER — FENTANYL CITRATE 50 UG/ML
25 INJECTION, SOLUTION INTRAMUSCULAR; INTRAVENOUS EVERY 5 MIN PRN
Status: DISCONTINUED | OUTPATIENT
Start: 2018-02-12 | End: 2018-02-12 | Stop reason: HOSPADM

## 2018-02-12 RX ORDER — ACETAMINOPHEN 10 MG/ML
1000 INJECTION, SOLUTION INTRAVENOUS ONCE
Status: COMPLETED | OUTPATIENT
Start: 2018-02-12 | End: 2018-02-12

## 2018-02-12 RX ORDER — PROPOFOL 10 MG/ML
VIAL (ML) INTRAVENOUS
Status: DISCONTINUED | OUTPATIENT
Start: 2018-02-12 | End: 2018-02-12

## 2018-02-12 RX ORDER — GLUCAGON 1 MG
1 KIT INJECTION
Status: DISCONTINUED | OUTPATIENT
Start: 2018-02-12 | End: 2018-02-13 | Stop reason: HOSPADM

## 2018-02-12 RX ORDER — OXYCODONE AND ACETAMINOPHEN 10; 325 MG/1; MG/1
1 TABLET ORAL EVERY 8 HOURS PRN
Status: DISCONTINUED | OUTPATIENT
Start: 2018-02-12 | End: 2018-02-13 | Stop reason: HOSPADM

## 2018-02-12 RX ADMIN — AMIODARONE HYDROCHLORIDE 200 MG: 200 TABLET ORAL at 09:02

## 2018-02-12 RX ADMIN — CEFAZOLIN SODIUM 2 G: 1 INJECTION, POWDER, FOR SOLUTION INTRAMUSCULAR; INTRAVENOUS at 06:02

## 2018-02-12 RX ADMIN — FENTANYL CITRATE 100 MCG: 50 INJECTION, SOLUTION INTRAMUSCULAR; INTRAVENOUS at 07:02

## 2018-02-12 RX ADMIN — PHENYLEPHRINE HYDROCHLORIDE 100 MCG: 10 INJECTION INTRAVENOUS at 08:02

## 2018-02-12 RX ADMIN — PROPOFOL 50 MG: 10 INJECTION, EMULSION INTRAVENOUS at 01:02

## 2018-02-12 RX ADMIN — ACETAMINOPHEN 1000 MG: 10 INJECTION, SOLUTION INTRAVENOUS at 03:02

## 2018-02-12 RX ADMIN — ROCURONIUM BROMIDE 20 MG: 10 INJECTION, SOLUTION INTRAVENOUS at 11:02

## 2018-02-12 RX ADMIN — EPHEDRINE SULFATE 15 MG: 50 INJECTION, SOLUTION INTRAMUSCULAR; INTRAVENOUS; SUBCUTANEOUS at 09:02

## 2018-02-12 RX ADMIN — EPHEDRINE SULFATE 15 MG: 50 INJECTION, SOLUTION INTRAMUSCULAR; INTRAVENOUS; SUBCUTANEOUS at 01:02

## 2018-02-12 RX ADMIN — ROCURONIUM BROMIDE 20 MG: 10 INJECTION, SOLUTION INTRAVENOUS at 09:02

## 2018-02-12 RX ADMIN — PHENYLEPHRINE HYDROCHLORIDE 200 MCG: 10 INJECTION INTRAVENOUS at 08:02

## 2018-02-12 RX ADMIN — DOPAMINE HYDROCHLORIDE IN DEXTROSE 3 MCG/KG/MIN: 1.6 INJECTION, SOLUTION INTRAVENOUS at 09:02

## 2018-02-12 RX ADMIN — PHENYLEPHRINE HYDROCHLORIDE 200 MCG: 10 INJECTION INTRAVENOUS at 09:02

## 2018-02-12 RX ADMIN — MIDAZOLAM 2 MG: 1 INJECTION INTRAMUSCULAR; INTRAVENOUS at 07:02

## 2018-02-12 RX ADMIN — SACUBITRIL AND VALSARTAN 1 TABLET: 49; 51 TABLET, FILM COATED ORAL at 09:02

## 2018-02-12 RX ADMIN — DEXTROSE 2 G: 50 INJECTION, SOLUTION INTRAVENOUS at 07:02

## 2018-02-12 RX ADMIN — ROCURONIUM BROMIDE 30 MG: 10 INJECTION, SOLUTION INTRAVENOUS at 08:02

## 2018-02-12 RX ADMIN — FENTANYL CITRATE 25 MCG: 50 INJECTION, SOLUTION INTRAMUSCULAR; INTRAVENOUS at 04:02

## 2018-02-12 RX ADMIN — DEXTROSE 2 G: 50 INJECTION, SOLUTION INTRAVENOUS at 11:02

## 2018-02-12 RX ADMIN — GLYCOPYRROLATE 0.2 MG: 0.2 INJECTION, SOLUTION INTRAMUSCULAR; INTRAVENOUS at 07:02

## 2018-02-12 RX ADMIN — FENTANYL CITRATE 25 MCG: 50 INJECTION, SOLUTION INTRAMUSCULAR; INTRAVENOUS at 03:02

## 2018-02-12 RX ADMIN — RANOLAZINE 500 MG: 500 TABLET, FILM COATED, EXTENDED RELEASE ORAL at 09:02

## 2018-02-12 RX ADMIN — LIDOCAINE HYDROCHLORIDE 50 MG: 10 INJECTION, SOLUTION INTRAVENOUS at 07:02

## 2018-02-12 RX ADMIN — PROPOFOL 150 MG: 10 INJECTION, EMULSION INTRAVENOUS at 07:02

## 2018-02-12 RX ADMIN — SODIUM CHLORIDE, SODIUM GLUCONATE, SODIUM ACETATE, POTASSIUM CHLORIDE, MAGNESIUM CHLORIDE, SODIUM PHOSPHATE, DIBASIC, AND POTASSIUM PHOSPHATE: .53; .5; .37; .037; .03; .012; .00082 INJECTION, SOLUTION INTRAVENOUS at 07:02

## 2018-02-12 RX ADMIN — ROCURONIUM BROMIDE 50 MG: 10 INJECTION, SOLUTION INTRAVENOUS at 07:02

## 2018-02-12 RX ADMIN — IODIXANOL 10 ML: 320 INJECTION, SOLUTION INTRAVASCULAR at 08:02

## 2018-02-12 RX ADMIN — KETAMINE HYDROCHLORIDE 50 MG: 100 INJECTION, SOLUTION, CONCENTRATE INTRAMUSCULAR; INTRAVENOUS at 07:02

## 2018-02-12 RX ADMIN — SODIUM CHLORIDE, SODIUM GLUCONATE, SODIUM ACETATE, POTASSIUM CHLORIDE, MAGNESIUM CHLORIDE, SODIUM PHOSPHATE, DIBASIC, AND POTASSIUM PHOSPHATE: .53; .5; .37; .037; .03; .012; .00082 INJECTION, SOLUTION INTRAVENOUS at 08:02

## 2018-02-12 RX ADMIN — PHENYLEPHRINE HYDROCHLORIDE 100 MCG: 10 INJECTION INTRAVENOUS at 01:02

## 2018-02-12 RX ADMIN — PHENYLEPHRINE HYDROCHLORIDE 100 MCG: 10 INJECTION INTRAVENOUS at 07:02

## 2018-02-12 RX ADMIN — OXYCODONE HYDROCHLORIDE AND ACETAMINOPHEN 1 TABLET: 10; 325 TABLET ORAL at 02:02

## 2018-02-12 RX ADMIN — FUROSEMIDE 40 MG: 40 TABLET ORAL at 09:02

## 2018-02-12 RX ADMIN — SUGAMMADEX 500 MG: 100 INJECTION, SOLUTION INTRAVENOUS at 01:02

## 2018-02-12 RX ADMIN — OXYCODONE HYDROCHLORIDE AND ACETAMINOPHEN 1 TABLET: 10; 325 TABLET ORAL at 09:02

## 2018-02-12 RX ADMIN — EPHEDRINE SULFATE 5 MG: 50 INJECTION, SOLUTION INTRAMUSCULAR; INTRAVENOUS; SUBCUTANEOUS at 09:02

## 2018-02-12 NOTE — TRANSFER OF CARE
"Anesthesia Transfer of Care Note    Patient: Quentin Mckeon    Procedure(s) Performed: Procedure(s) (LRB):  EXTRACTION-LEAD (N/A)    Patient location: PACU    Anesthesia Type: general    Transport from OR: Transported from OR on 6-10 L/min O2 by face mask with adequate spontaneous ventilation    Post pain: adequate analgesia    Post assessment: no apparent anesthetic complications and tolerated procedure well    Post vital signs: stable    Level of consciousness: awake, alert and oriented    Nausea/Vomiting: no nausea/vomiting    Complications: none    Transfer of care protocol was followed      Last vitals:   Visit Vitals  BP (!) 128/91   Pulse 80   Temp 36.8 °C (98.2 °F)   Resp 16   Ht 5' 9" (1.753 m)   Wt 105.2 kg (231 lb 14.8 oz)   SpO2 (!) 94%   BMI 34.25 kg/m²     "

## 2018-02-12 NOTE — ANESTHESIA POSTPROCEDURE EVALUATION
"Anesthesia Post Evaluation    Patient: Quentin Mckeon    Procedure(s) Performed: Procedure(s) (LRB):  EXTRACTION-LEAD (N/A)    Final Anesthesia Type: general  Patient location during evaluation: PACU  Patient participation: Yes- Able to Participate  Level of consciousness: awake and alert and oriented  Post-procedure vital signs: reviewed and stable  Pain management: adequate  Airway patency: patent  PONV status at discharge: No PONV  Anesthetic complications: no      Cardiovascular status: blood pressure returned to baseline and hemodynamically stable  Respiratory status: spontaneous ventilation, unassisted and room air  Hydration status: euvolemic  Follow-up not needed.        Visit Vitals  /66   Pulse 60   Temp 37.1 °C (98.8 °F) (Oral)   Resp 18   Ht 5' 9" (1.753 m)   Wt 105.2 kg (231 lb 14.8 oz)   SpO2 96%   BMI 34.25 kg/m²       Pain/Gold Score: Pain Assessment Performed: Yes (2/12/2018  4:15 PM)  Presence of Pain: complains of pain/discomfort (2/12/2018  4:15 PM)  Pain Rating Prior to Med Admin: 8 (2/12/2018  4:15 PM)  Gold Score: 10 (2/12/2018  4:15 PM)      "

## 2018-02-12 NOTE — HPI
62 year old man with CAD s/p CABG and MVR who was admitted with VT, found to have likely lead fracture on arrival who presents to EP lab today for lead extraction. NITA requested prior to assist with removal of leads

## 2018-02-12 NOTE — PROGRESS NOTES
[facility] Hospital Medicine  Progress Note    Patient Name: Quentin Mckeon  MRN: [id number]  Patient Class: IP- Inpatient   Admission Date: 2/9/2018  Length of Stay: 3 days  Attending Physician: Tre Can MD  Primary Care Provider: [facility] Medicine Team: [facility]OSP MED 4 Steffany Gibbs MD    Subjective:     Principal Problem:AICD lead malfunction    HPI:  Patient is a 62 yo M with DCM s/p bi V AICD, CABG 2014, HFrEF (EF 38, echo 8/2017), HTN, MV biprothetic valve repair, Afib/Aflutter, Vfib/Vtach, former EtOH abuse, tobacco use,  who presents with a ventricular lead that is not capturing intermittently. On 1/27/18 the patient was at home watching television on the side of his bed when he passed out. This came out of nowhere and he fell back onto the bed and the next thing he knew he was coming to which he estimated at just a few seconds. Patient did not lose continence or have any jerking movements he was aware of. While coming to he was a bit confused but realized something was wrong, called 911 and got transported to the hospital. While in the hospital, the patient had his leads interrogated and showed a V tach episode with appropriate shock. A CT head was negative done at this time. Once patient was at the hospital, there is a note from cardiology about chest pain radiating down his arm and him taking nitro with no relief, but patient denies this to me. Cardiology consultation at [place] noted that he has malfunction of the pacing portion of the right ventricular lead resulting in noise on the RV lead channel.  Pt has a heart block with intermittent non capture pauses of 2.2 seconds. This was deemed to be not leading to his syncope as that is a result of ventricular arrhythmia which they say it may be reasonable to consider an endocardial VT ablation with assistance of impella or IABP for recurrent hemodynamically unstable sustained V tach despite amiodarone therapy  requiring ICD shock for termination in the setting of chronic systolic dysfunction.   Patient became febrile on 1/31/18 and ID was consulted. They diagnosed pt with the flu while in hospital. BCx and UCx were negative and he was treated with tamiflu. Pt also had a TTE showinga mobile echogenic mass attached to his pacer wire. 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 Vanc and rocephin were also started, but stopped on 2/1/18 when it was determined that the vegetation was not an infection.      At [facility] 2/9/18 the patient has no complaints and is asymptomatic. He has recovered from the flu and is awaiting a consult with EP for R ventricular lead replacement in his AICD. Of note patient is former alcoholic and only other syncope episode was while he was drinking. He was at that time drinking 1/5 of elisabeth posadas per day. He has been sober for 1 year now.     Hospital Course:  2/9/2018: Admission date. EP contacted who state that there is RV lead dyfunction with plan of exchange and revision on Monday 1/12 with CTS back-up  02/10/2018: No acute events overnight. Patient denies chest pain, shortness of breath, palpitations. Without fevers or chills.   02/11/2018: NAEON. Awaiting possible procedure Monday. Denies chest pain, SOB, palpitations.   02/12/2018: NAEON. Awaiting procedure today. No subjective complaints.      Review of Systems   Constitutional: Negative for chills, fever and unexpected weight change.   Respiratory: Negative for cough, shortness of breath and wheezing.    Cardiovascular: Negative for chest pain, palpitations and leg swelling.   Gastrointestinal: Negative for abdominal pain, blood in stool, constipation, diarrhea, nausea and vomiting.   Genitourinary: Negative for dysuria and hematuria.   Neurological: Negative for dizziness, tremors, seizures, syncope, weakness and headaches.     Objective:     Vital Signs (Most Recent):  Temp: 98.2 °F (36.8  °C) (02/12/18 0641)  Pulse: 80 (02/12/18 0641)  Resp: 16 (02/12/18 0641)  BP: (!) 128/91 (02/12/18 0641)  SpO2: (!) 94 % (02/12/18 0641) Vital Signs (24h Range):  Temp:  [96.4 °F (35.8 °C)-98.5 °F (36.9 °C)] 98.2 °F (36.8 °C)  Pulse:  [66-80] 80  Resp:  [13-20] 16  SpO2:  [90 %-98 %] 94 %  BP: ()/(67-92) 128/91     Weight: 105.2 kg (231 lb 14.8 oz)  Body mass index is 34.25 kg/m².    Intake/Output Summary (Last 24 hours) at 02/12/18 0844  Last data filed at 02/12/18 0700   Gross per 24 hour   Intake              900 ml   Output             1700 ml   Net             -800 ml      Physical Exam   Constitutional: He is oriented to person, place, and time. No distress.   Cardiovascular: Normal rate, regular rhythm and normal heart sounds.  Exam reveals no friction rub.    Pulmonary/Chest: Effort normal and breath sounds normal. He has no wheezes. He has no rales.   Abdominal: Soft. He exhibits no distension. There is no tenderness.   Musculoskeletal: He exhibits no edema or tenderness.   Neurological: He is alert and oriented to person, place, and time.   Skin: Skin is warm. He is not diaphoretic.   Psychiatric: He has a normal mood and affect.   Nursing note and vitals reviewed.      Significant Labs:   BMP:     Recent Labs  Lab 02/12/18 0639   GLU 88      K 3.9      CO2 24   BUN 11   CREATININE 0.9   CALCIUM 10.1   MG 1.9     CBC:     Recent Labs  Lab 02/12/18 0639   WBC 4.79   HGB 12.8*   HCT 38.6*        Assessment/Plan:      * AICD lead malfunction    - History of monomorphic vtach with history of ICD/pacermaker  - Transfer from [facility] where interrogation of device significant for malfunction of the pacing portion of the right ventricular lead resulting in noise on the RV lead channel.  - EP planning for ICD/LV lead extraction and re-implant; CTS will need to be on for back-up for procedure   - Patient is clinically stable and awaiting procedure today.  - Currently patient is  asymptomatic/stable, continue to monitor        Atrial fibrillation    - ChadVASC 2- Now with paced rhythm  - Holding rivaroxaban while awaiting for lead extraction on 2/12.         Ventricular tachycardia, monomorphic    - S/p ICD needing revision.  - HR remains stable during hospitalization.   - On cardiac monitoring and if unable to get lead revision on 2/12, will schedule with outpatient life vest and follow-up with EP.        Chronic stable angina    - Patient is asymptomatic and stable  - Treating with home meds- ranolazine, ISMN and nitroglycerin as needed  - Continue to monitor        Chronic anticoagulation    Please see A fib        CAD (coronary artery disease) of artery bypass graft    - Patient with CABG in 2014  - Currently asymptomatic        Chronic systolic heart failure    - Patient is asymptomatic and stable  - Continue home furosemide 40mg PO BID, Toprol 12.5, digoxin and ISMN 30        Elevated troponin    - TnI .37, on admission, suspect related to ICD discharge  - No chest pain, EKG with AV paced rhythm.   - Patient asymptomatic, monitor        Esophageal erosions    - Patient is asymptomatic and stable  - Continue to monitor        HLD (hyperlipidemia)    - Continue home atorvastatin 40 mg PO daily        Essential hypertension    - Continue home Toprol, digoxin, ISMN        Heart block    - History of complete AV block, now with pacemaker.  - Defective CRT-D - with some sinus pauses on 2/9 but without them since.   - Currently patient is asymptomatic/stable, continue to monitor          VTE Risk Mitigation         Ordered     High Risk of VTE  Once      02/11/18 0856     Place PARAMJIT hose  Until discontinued      02/11/18 0856              Steffany Gibbs MD  Department of Hospital Medicine   [facility]

## 2018-02-12 NOTE — NURSING
Patient off the unit to EP/Pre-Op lab as at shift change. Patient will be assessed upon return to unit.

## 2018-02-12 NOTE — ANESTHESIA RELEASE NOTE
"Anesthesia Release from PACU Note    Patient: Quentin Mckeon    Procedure(s) Performed: Procedure(s) (LRB):  EXTRACTION-LEAD (N/A)    Anesthesia type: general    Post pain: Adequate analgesia    Post assessment: no apparent anesthetic complications, tolerated procedure well and no evidence of recall    Last Vitals:   Visit Vitals  /66   Pulse 60   Temp 37.1 °C (98.8 °F) (Oral)   Resp 18   Ht 5' 9" (1.753 m)   Wt 105.2 kg (231 lb 14.8 oz)   SpO2 96%   BMI 34.25 kg/m²       Post vital signs: stable    Level of consciousness: awake, alert  and oriented    Nausea/Vomiting: no nausea/no vomiting    Complications: none    Airway Patency: patent    Respiratory: unassisted, spontaneous ventilation, room air    Cardiovascular: stable and blood pressure at baseline    Hydration: euvolemic  "

## 2018-02-12 NOTE — PLAN OF CARE
VSS. Patient c/o pain to lower back, HOB elevated to 20degrees w/ relief of pain. Pt states pain is tolerable. No N&V. Pt on bedrest until 1800; frequent checks x3 hourly, RN aware. L arm in sling, with ice pack to incision. Pt has schilling catheter w/ dark yellow urine. Report given to CHAS Aguilar and transfered to room 379, Tele tech notified pt on portable telemetry box, rhythm verified with Kylee. Pt to call sister, Ryan, when pt gets back to room.

## 2018-02-12 NOTE — NURSING TRANSFER
Nursing Transfer Note      2/12/2018     Transfer From: PACU    Transfer via stretcher    Transfer with cardiac monitoring    Transported by tech    Medicines sent: none    Chart send with patient: Yes    VSS stable, bilateral groin site intact, dry, and clean. Pedal pulses palpable 2+.    C/o =Slight headache.

## 2018-02-12 NOTE — SUBJECTIVE & OBJECTIVE
Review of Systems   Constitutional: Negative for chills, fever and unexpected weight change.   Respiratory: Negative for cough, shortness of breath and wheezing.    Cardiovascular: Negative for chest pain, palpitations and leg swelling.   Gastrointestinal: Negative for abdominal pain, blood in stool, constipation, diarrhea, nausea and vomiting.   Genitourinary: Negative for dysuria and hematuria.   Neurological: Negative for dizziness, tremors, seizures, syncope, weakness and headaches.     Objective:     Vital Signs (Most Recent):  Temp: 98.2 °F (36.8 °C) (02/12/18 0641)  Pulse: 80 (02/12/18 0641)  Resp: 16 (02/12/18 0641)  BP: (!) 128/91 (02/12/18 0641)  SpO2: (!) 94 % (02/12/18 0641) Vital Signs (24h Range):  Temp:  [96.4 °F (35.8 °C)-98.5 °F (36.9 °C)] 98.2 °F (36.8 °C)  Pulse:  [66-80] 80  Resp:  [13-20] 16  SpO2:  [90 %-98 %] 94 %  BP: ()/(67-92) 128/91     Weight: 105.2 kg (231 lb 14.8 oz)  Body mass index is 34.25 kg/m².    Intake/Output Summary (Last 24 hours) at 02/12/18 0844  Last data filed at 02/12/18 0700   Gross per 24 hour   Intake              900 ml   Output             1700 ml   Net             -800 ml      Physical Exam   Constitutional: He is oriented to person, place, and time. No distress.   Cardiovascular: Normal rate, regular rhythm and normal heart sounds.  Exam reveals no friction rub.    Pulmonary/Chest: Effort normal and breath sounds normal. He has no wheezes. He has no rales.   Abdominal: Soft. He exhibits no distension. There is no tenderness.   Musculoskeletal: He exhibits no edema or tenderness.   Neurological: He is alert and oriented to person, place, and time.   Skin: Skin is warm. He is not diaphoretic.   Psychiatric: He has a normal mood and affect.   Nursing note and vitals reviewed.      Significant Labs:   BMP:     Recent Labs  Lab 02/12/18  0639   GLU 88      K 3.9      CO2 24   BUN 11   CREATININE 0.9   CALCIUM 10.1   MG 1.9     CBC:     Recent  Labs  Lab 02/12/18  0639   WBC 4.79   HGB 12.8*   HCT 38.6*

## 2018-02-12 NOTE — SUBJECTIVE & OBJECTIVE
History reviewed. No pertinent past medical history.    History reviewed. No pertinent surgical history.    Review of patient's allergies indicates:  No Known Allergies    No current facility-administered medications on file prior to encounter.      No current outpatient prescriptions on file prior to encounter.     Family History     None        Social History Main Topics    Smoking status: Current Every Day Smoker     Packs/day: 0.25     Years: 50.00    Smokeless tobacco: Current User     Types: Snuff    Alcohol use No    Drug use: No    Sexual activity: Not on file     Review of Systems   Constitution: Negative for chills and fever.   Cardiovascular: Negative for chest pain, dyspnea on exertion and orthopnea.   Respiratory: Negative for cough, shortness of breath and wheezing.    Gastrointestinal: Negative for constipation, diarrhea and melena.   Genitourinary: Negative for dysuria and hematuria.     Objective:     Vital Signs (Most Recent):  Temp: 98.2 °F (36.8 °C) (02/12/18 0641)  Pulse: 80 (02/12/18 0641)  Resp: 16 (02/12/18 0641)  BP: (!) 128/91 (02/12/18 0641)  SpO2: (!) 94 % (02/12/18 0641) Vital Signs (24h Range):  Temp:  [96.4 °F (35.8 °C)-98.5 °F (36.9 °C)] 98.2 °F (36.8 °C)  Pulse:  [66-80] 80  Resp:  [13-21] 16  SpO2:  [90 %-98 %] 94 %  BP: ()/(67-92) 128/91     Weight: 105.2 kg (231 lb 14.8 oz)  Body mass index is 34.25 kg/m².    SpO2: (!) 94 %  O2 Device (Oxygen Therapy): room air      Intake/Output Summary (Last 24 hours) at 02/12/18 0733  Last data filed at 02/12/18 0700   Gross per 24 hour   Intake              900 ml   Output             1700 ml   Net             -800 ml       Lines/Drains/Airways     Peripheral Intravenous Line                 Peripheral IV - Single Lumen 02/10/18 0200 Right Forearm 2 days         Peripheral IV - Single Lumen 02/11/18 1125 Left Forearm less than 1 day                Physical Exam   Gen: no acute distress, alert & oriented x 3  Neck: no JVD, no carotid  bruits  CV: regular rate and rhythm, normal S1/2, no murmurs, rubs, gallops  Resp: clear to auscultation bilaterally, normal effort  GI: soft, nontender nondistended, normal bowel sounds  Ext: warm well perfused, no edema, no clubbing or cyanosis      Significant Labs:   All pertinent lab results from the last 24 hours have been reviewed. and   Recent Lab Results       02/12/18  0639 02/12/18  0002 02/11/18  1554      Immature Granulocytes 0.4       Immature Grans (Abs) 0.02  Comment:  Mild elevation in immature granulocytes is non specific and   can be seen in a variety of conditions including stress response,   acute inflammation, trauma and pregnancy. Correlation with other   laboratory and clinical findings is essential.         Albumin 3.5       Alkaline Phosphatase 72       ALT 29       Anion Gap 10       aPTT   22.5  Comment:  aPTT therapeutic range = 39-69 seconds     AST 25       Baso # 0.02       Basophil% 0.4       Total Bilirubin 0.7  Comment:  For infants and newborns, interpretation of results should be based  on gestational age, weight and in agreement with clinical  observations.  Premature Infant recommended reference ranges:  Up to 24 hours.............<8.0 mg/dL  Up to 48 hours............<12.0 mg/dL  3-5 days..................<15.0 mg/dL  6-29 days.................<15.0 mg/dL         BUN, Bld 11       Calcium 10.1       Chloride 102       CO2 24       Creatinine 0.9       Differential Method Automated       eGFR if  >60.0       eGFR if non  >60.0  Comment:  Calculation used to obtain the estimated glomerular filtration  rate (eGFR) is the CKD-EPI equation.          Eos # 0.2       Eosinophil% 3.3       Glucose 88       Gran # (ANC) 2.1       Gran% 44.0       Hematocrit 38.6(L)       Hemoglobin 12.8(L)       Heparin Anti-Xa  <0.10  Comment:  Expected therapeutic range for Unfractionated heparin (UFH)  is 0.3-0.7 IU/mL.  The therapeutic range for low molecular weight  heparins   (LMWH) varies with the type and , but is   typically between 0.4 and 1.1 IU/mL.  (L)      Coumadin Monitoring INR   1.0  Comment:  Coumadin Therapy:  2.0 - 3.0 for INR for all indicators except mechanical heart valves  and antiphospholipid syndromes which should use 2.5 - 3.5.       Lymph # 1.9       Lymph% 39.2       Magnesium 1.9       MCH 31.3(H)       MCHC 33.2       MCV 94       Mono # 0.6       Mono% 12.7       MPV 10.2       nRBC 0       Platelets 197       Potassium 3.9       Total Protein 7.1       Protime   10.3     RBC 4.09(L)       RDW 13.3       Sodium 136       WBC 4.79             Significant Imaging: Echocardiogram:   2D echo with color flow doppler:   Results for orders placed or performed during the hospital encounter of 02/09/18   2D echo with color flow doppler   Result Value Ref Range    EF 38 (A) 55 - 65    Mitral Valve Regurgitation TRIVIAL     Aortic Valve Regurgitation TRIVIAL     Est. PA Systolic Pressure 43.96 (A)     Tricuspid Valve Regurgitation MILD

## 2018-02-12 NOTE — NURSING TRANSFER
Nursing Transfer Note      2/12/2018     Transfer To: 379    Transfer via stretcher    Transfer with cardiac monitoring     Transported by ADIS Santana    Medicines sent: none    Chart send with patient: Yes    Notified: called pt sister w/ no answer; pt stated he would call from cell phone when transferred back to room    Patient reassessed at: 2/12/18 7503

## 2018-02-12 NOTE — ASSESSMENT & PLAN NOTE
Mr. Mckeon is a 62 year old man with CAD s/p CABG and MVR who presents with VT, found to have a lead fracture. Plan for lead extraction today, NITA prior to evaluate lead placement.    PLAN:  1. NITA for evaluation for lead placement for extraction    -The risks, benefits & alternatives of the procedure were explained to the patient.    -The risks of transesophageal echo include but are not limited to:  Dental trauma, esophageal trauma/perforation, bleeding, laryngospasm/brochospasm, aspiration, sore throat/hoarseness, & dislodgement of the endotracheal tube/nasogastric tube (where applicable).    -The risks of moderate sedation include hypotension, respiratory depression, arrhythmias, bronchospasm, & death.    -Informed consent was obtained & the patient is agreeable to proceed with the procedure.    I will discuss with the attending physician. Attending addendum is to follow.     Further recommendations per attending addendum    Darin Luna MD  PGY-[phone])  Cardiology Fellow

## 2018-02-12 NOTE — PLAN OF CARE
Problem: Patient Care Overview  Goal: Plan of Care Review  Outcome: Revised  Plan of care discussed with patient. Patient on bed rest, left arm in sling,ice pack to site. Logan catheter in place. Advance diet as tolerated. Bilateral groin site clean, dry, and intact.  All questions addressed. Will continue to monitor

## 2018-02-12 NOTE — PROGRESS NOTES
Pt transferred with tele monitor intact with nurse to PACU 1, due to unit closure,  Report given to Evonne DOHERTY

## 2018-02-12 NOTE — ANESTHESIA PROCEDURE NOTES
Arterial    Diagnosis: Infected ICD  Doctor requesting consult: Amilcar    Patient location during procedure: done in OR  Procedure start time: 2/12/2018 7:46 AM  Timeout: 2/12/2018 7:45 AM  Procedure end time: 2/12/2018 7:48 AM  Staffing  Anesthesiologist: NATALI MARY  Performed: anesthesiologist   Anesthesiologist was present at the time of the procedure.  Preanesthetic Checklist  Completed: patient identified, site marked, surgical consent, pre-op evaluation, timeout performed, IV checked, risks and benefits discussed, monitors and equipment checked and anesthesia consent givenArterial  Skin Prep: chlorhexidine gluconate  Local Infiltration: lidocaine  Orientation: left  Location: radial  Catheter Size: 20 G  Catheter placement by Anatomical landmarks. Heme positive aspiration all ports.Insertion Attempts: 1  Assessment  Dressing: secured with tape and tegaderm and secured with tape  Patient: Tolerated well

## 2018-02-12 NOTE — PLAN OF CARE
Problem: Patient Care Overview  Goal: Plan of Care Review  Outcome: Ongoing (interventions implemented as appropriate)  Pt. Remains free from falls/ injury/trauma. No complaints of CP, SOB, or discomfort. Hibiclens bath performed per MD order for Lead revision in the am.  Plan of Care reviewed with patient. VSS and NADN. Will continue to monitor.

## 2018-02-12 NOTE — CONSULTS
[facility]  Cardiology  Consult Note    Patient Name: Quentin Mckeon  MRN: [id number]  Admission Date: 2/9/2018  Hospital Length of Stay: 3 days  Code Status: Full Code   Attending Provider: Tre Can MD   Consulting Provider: Darin Huerta MD  Primary Care Physician: Primary Doctor No  Principal Problem:AICD lead malfunction    Patient information was obtained from patient.     Consults  Subjective:     Chief Complaint:  Lead extraction     HPI:   62 year old man with CAD s/p CABG and MVR who was admitted with VT, found to have likely lead fracture on arrival who presents to EP lab today for lead extraction. NITA requested prior to assist with removal of leads    History reviewed. No pertinent past medical history.    History reviewed. No pertinent surgical history.    Review of patient's allergies indicates:  No Known Allergies    No current facility-administered medications on file prior to encounter.      No current outpatient prescriptions on file prior to encounter.     Family History     None        Social History Main Topics    Smoking status: Current Every Day Smoker     Packs/day: 0.25     Years: 50.00    Smokeless tobacco: Current User     Types: Snuff    Alcohol use No    Drug use: No    Sexual activity: Not on file     Review of Systems   Constitution: Negative for chills and fever.   Cardiovascular: Negative for chest pain, dyspnea on exertion and orthopnea.   Respiratory: Negative for cough, shortness of breath and wheezing.    Gastrointestinal: Negative for constipation, diarrhea and melena.   Genitourinary: Negative for dysuria and hematuria.     Objective:     Vital Signs (Most Recent):  Temp: 98.2 °F (36.8 °C) (02/12/18 0641)  Pulse: 80 (02/12/18 0641)  Resp: 16 (02/12/18 0641)  BP: (!) 128/91 (02/12/18 0641)  SpO2: (!) 94 % (02/12/18 0641) Vital Signs (24h Range):  Temp:  [96.4 °F (35.8 °C)-98.5 °F (36.9 °C)] 98.2 °F (36.8 °C)  Pulse:  [66-80] 80  Resp:  [13-21]  16  SpO2:  [90 %-98 %] 94 %  BP: ()/(67-92) 128/91     Weight: 105.2 kg (231 lb 14.8 oz)  Body mass index is 34.25 kg/m².    SpO2: (!) 94 %  O2 Device (Oxygen Therapy): room air      Intake/Output Summary (Last 24 hours) at 02/12/18 0733  Last data filed at 02/12/18 0700   Gross per 24 hour   Intake              900 ml   Output             1700 ml   Net             -800 ml       Lines/Drains/Airways     Peripheral Intravenous Line                 Peripheral IV - Single Lumen 02/10/18 0200 Right Forearm 2 days         Peripheral IV - Single Lumen 02/11/18 1125 Left Forearm less than 1 day                Physical Exam   Gen: no acute distress, alert & oriented x 3  Neck: no JVD, no carotid bruits  CV: regular rate and rhythm, normal S1/2, no murmurs, rubs, gallops  Resp: clear to auscultation bilaterally, normal effort  GI: soft, nontender nondistended, normal bowel sounds  Ext: warm well perfused, no edema, no clubbing or cyanosis      Significant Labs:   All pertinent lab results from the last 24 hours have been reviewed. and   Recent Lab Results       02/12/18  0639 02/12/18  0002 02/11/18  1554      Immature Granulocytes 0.4       Immature Grans (Abs) 0.02  Comment:  Mild elevation in immature granulocytes is non specific and   can be seen in a variety of conditions including stress response,   acute inflammation, trauma and pregnancy. Correlation with other   laboratory and clinical findings is essential.         Albumin 3.5       Alkaline Phosphatase 72       ALT 29       Anion Gap 10       aPTT   22.5  Comment:  aPTT therapeutic range = 39-69 seconds     AST 25       Baso # 0.02       Basophil% 0.4       Total Bilirubin 0.7  Comment:  For infants and newborns, interpretation of results should be based  on gestational age, weight and in agreement with clinical  observations.  Premature Infant recommended reference ranges:  Up to 24 hours.............<8.0 mg/dL  Up to 48 hours............<12.0 mg/dL  3-5  days..................<15.0 mg/dL  6-29 days.................<15.0 mg/dL         BUN, Bld 11       Calcium 10.1       Chloride 102       CO2 24       Creatinine 0.9       Differential Method Automated       eGFR if  >60.0       eGFR if non  >60.0  Comment:  Calculation used to obtain the estimated glomerular filtration  rate (eGFR) is the CKD-EPI equation.          Eos # 0.2       Eosinophil% 3.3       Glucose 88       Gran # (ANC) 2.1       Gran% 44.0       Hematocrit 38.6(L)       Hemoglobin 12.8(L)       Heparin Anti-Xa  <0.10  Comment:  Expected therapeutic range for Unfractionated heparin (UFH)  is 0.3-0.7 IU/mL.  The therapeutic range for low molecular weight heparins   (LMWH) varies with the type and , but is   typically between 0.4 and 1.1 IU/mL.  (L)      Coumadin Monitoring INR   1.0  Comment:  Coumadin Therapy:  2.0 - 3.0 for INR for all indicators except mechanical heart valves  and antiphospholipid syndromes which should use 2.5 - 3.5.       Lymph # 1.9       Lymph% 39.2       Magnesium 1.9       MCH 31.3(H)       MCHC 33.2       MCV 94       Mono # 0.6       Mono% 12.7       MPV 10.2       nRBC 0       Platelets 197       Potassium 3.9       Total Protein 7.1       Protime   10.3     RBC 4.09(L)       RDW 13.3       Sodium 136       WBC 4.79             Significant Imaging: Echocardiogram:   2D echo with color flow doppler:   Results for orders placed or performed during the hospital encounter of 02/09/18   2D echo with color flow doppler   Result Value Ref Range    EF 38 (A) 55 - 65    Mitral Valve Regurgitation TRIVIAL     Aortic Valve Regurgitation TRIVIAL     Est. PA Systolic Pressure 43.96 (A)     Tricuspid Valve Regurgitation MILD      Assessment and Plan:     * AICD lead malfunction    Mr. Mckeon is a 62 year old man with CAD s/p CABG and MVR who presents with VT, found to have a lead fracture. Plan for lead extraction today, NITA prior to evaluate lead  placement.    PLAN:  1. NITA for evaluation for lead placement for extraction    -The risks, benefits & alternatives of the procedure were explained to the patient.    -The risks of transesophageal echo include but are not limited to:  Dental trauma, esophageal trauma/perforation, bleeding, laryngospasm/brochospasm, aspiration, sore throat/hoarseness, & dislodgement of the endotracheal tube/nasogastric tube (where applicable).    -The risks of moderate sedation include hypotension, respiratory depression, arrhythmias, bronchospasm, & death.    -Informed consent was obtained & the patient is agreeable to proceed with the procedure.    I will discuss with the attending physician. Attending addendum is to follow.     Further recommendations per attending addendum    Darin Luna MD  PGY-[phone])  Cardiology Fellow                VTE Risk Mitigation         Ordered     High Risk of VTE  Once      02/11/18 0856     Place PARAMJIT hose  Until discontinued      02/11/18 0856          Thank you for your consult.     Darin Luna MD  Cardiology   [facility]

## 2018-02-12 NOTE — ASSESSMENT & PLAN NOTE
- History of monomorphic vtach with history of ICD/pacermaker  - Transfer from [facility] where interrogation of device significant for malfunction of the pacing portion of the right ventricular lead resulting in noise on the RV lead channel.  - EP planning for ICD/LV lead extraction and re-implant; CTS will need to be on for back-up for procedure   - Patient is clinically stable and awaiting procedure today.  - Currently patient is asymptomatic/stable, continue to monitor

## 2018-02-13 VITALS
HEART RATE: 60 BPM | RESPIRATION RATE: 19 BRPM | SYSTOLIC BLOOD PRESSURE: 113 MMHG | TEMPERATURE: 99 F | BODY MASS INDEX: 34.35 KG/M2 | HEIGHT: 69 IN | WEIGHT: 231.94 LBS | DIASTOLIC BLOOD PRESSURE: 80 MMHG | OXYGEN SATURATION: 91 %

## 2018-02-13 PROBLEM — R79.89 ELEVATED TROPONIN: Status: RESOLVED | Noted: 2018-02-09 | Resolved: 2018-02-13

## 2018-02-13 LAB
ALBUMIN SERPL BCP-MCNC: 3.1 G/DL
ALP SERPL-CCNC: 67 U/L
ALT SERPL W/O P-5'-P-CCNC: 23 U/L
ANION GAP SERPL CALC-SCNC: 10 MMOL/L
AST SERPL-CCNC: 24 U/L
BASOPHILS # BLD AUTO: 0.01 K/UL
BASOPHILS NFR BLD: 0.1 %
BILIRUB SERPL-MCNC: 0.4 MG/DL
BLD PROD TYP BPU: NORMAL
BLOOD UNIT EXPIRATION DATE: NORMAL
BLOOD UNIT TYPE CODE: 5100
BLOOD UNIT TYPE: NORMAL
BUN SERPL-MCNC: 18 MG/DL
CALCIUM SERPL-MCNC: 8.7 MG/DL
CHLORIDE SERPL-SCNC: 103 MMOL/L
CO2 SERPL-SCNC: 25 MMOL/L
CODING SYSTEM: NORMAL
CREAT SERPL-MCNC: 1.1 MG/DL
DIFFERENTIAL METHOD: ABNORMAL
DISPENSE STATUS: NORMAL
EOSINOPHIL # BLD AUTO: 0.1 K/UL
EOSINOPHIL NFR BLD: 1.7 %
ERYTHROCYTE [DISTWIDTH] IN BLOOD BY AUTOMATED COUNT: 13.6 %
EST. GFR  (AFRICAN AMERICAN): >60 ML/MIN/1.73 M^2
EST. GFR  (NON AFRICAN AMERICAN): >60 ML/MIN/1.73 M^2
GLUCOSE SERPL-MCNC: 97 MG/DL
HCT VFR BLD AUTO: 35.6 %
HGB BLD-MCNC: 11.8 G/DL
IMM GRANULOCYTES # BLD AUTO: 0.03 K/UL
IMM GRANULOCYTES NFR BLD AUTO: 0.4 %
LYMPHOCYTES # BLD AUTO: 1.7 K/UL
LYMPHOCYTES NFR BLD: 23.4 %
MAGNESIUM SERPL-MCNC: 2 MG/DL
MCH RBC QN AUTO: 31.9 PG
MCHC RBC AUTO-ENTMCNC: 33.1 G/DL
MCV RBC AUTO: 96 FL
MONOCYTES # BLD AUTO: 1.1 K/UL
MONOCYTES NFR BLD: 14.7 %
NEUTROPHILS # BLD AUTO: 4.3 K/UL
NEUTROPHILS NFR BLD: 59.7 %
NRBC BLD-RTO: 0 /100 WBC
PLATELET # BLD AUTO: 159 K/UL
PMV BLD AUTO: 10.3 FL
POTASSIUM SERPL-SCNC: 3.8 MMOL/L
PROT SERPL-MCNC: 6.5 G/DL
RBC # BLD AUTO: 3.7 M/UL
SODIUM SERPL-SCNC: 138 MMOL/L
TRANS ERYTHROCYTES VOL PATIENT: NORMAL ML
WBC # BLD AUTO: 7.15 K/UL

## 2018-02-13 PROCEDURE — 85025 COMPLETE CBC W/AUTO DIFF WBC: CPT

## 2018-02-13 PROCEDURE — 25000003 PHARM REV CODE 250: Performed by: STUDENT IN AN ORGANIZED HEALTH CARE EDUCATION/TRAINING PROGRAM

## 2018-02-13 PROCEDURE — 63600175 PHARM REV CODE 636 W HCPCS: Performed by: NURSE PRACTITIONER

## 2018-02-13 PROCEDURE — 99239 HOSP IP/OBS DSCHRG MGMT >30: CPT | Mod: GC,,, | Performed by: HOSPITALIST

## 2018-02-13 PROCEDURE — 80053 COMPREHEN METABOLIC PANEL: CPT

## 2018-02-13 PROCEDURE — 36415 COLL VENOUS BLD VENIPUNCTURE: CPT

## 2018-02-13 PROCEDURE — 83735 ASSAY OF MAGNESIUM: CPT

## 2018-02-13 PROCEDURE — 99231 SBSQ HOSP IP/OBS SF/LOW 25: CPT | Mod: GC,,, | Performed by: INTERNAL MEDICINE

## 2018-02-13 RX ORDER — ACETAMINOPHEN 325 MG/1
650 TABLET ORAL EVERY 4 HOURS PRN
Refills: 0 | COMMUNITY
Start: 2018-02-13

## 2018-02-13 RX ORDER — CEFADROXIL 500 MG/1
500 CAPSULE ORAL EVERY 12 HOURS
Qty: 10 CAPSULE | Refills: 0
Start: 2018-02-13 | End: 2018-02-18

## 2018-02-13 RX ORDER — CEFADROXIL 500 MG/1
500 CAPSULE ORAL EVERY 12 HOURS
Qty: 10 CAPSULE | Refills: 0 | Status: SHIPPED | OUTPATIENT
Start: 2018-02-13 | End: 2018-02-13

## 2018-02-13 RX ORDER — POTASSIUM CHLORIDE 750 MG/1
10 CAPSULE, EXTENDED RELEASE ORAL ONCE
Status: COMPLETED | OUTPATIENT
Start: 2018-02-13 | End: 2018-02-13

## 2018-02-13 RX ORDER — ONDANSETRON 4 MG/1
4 TABLET, ORALLY DISINTEGRATING ORAL EVERY 6 HOURS PRN
Qty: 10 TABLET | Refills: 0 | Status: CANCELLED | OUTPATIENT
Start: 2018-02-13

## 2018-02-13 RX ORDER — AMIODARONE HYDROCHLORIDE 200 MG/1
200 TABLET ORAL 2 TIMES DAILY
Qty: 60 TABLET | Refills: 3 | Status: ON HOLD | OUTPATIENT
Start: 2018-02-13 | End: 2018-10-25 | Stop reason: HOSPADM

## 2018-02-13 RX ORDER — HYDRALAZINE HYDROCHLORIDE 25 MG/1
25 TABLET, FILM COATED ORAL 3 TIMES DAILY
Status: ON HOLD
Start: 2018-02-13 | End: 2018-10-25 | Stop reason: HOSPADM

## 2018-02-13 RX ORDER — ISOSORBIDE MONONITRATE 30 MG/1
30 TABLET, EXTENDED RELEASE ORAL DAILY
Qty: 30 TABLET | Refills: 3 | Status: ON HOLD | OUTPATIENT
Start: 2018-02-13 | End: 2018-10-25 | Stop reason: HOSPADM

## 2018-02-13 RX ORDER — FUROSEMIDE 40 MG/1
40 TABLET ORAL 2 TIMES DAILY
Qty: 60 TABLET | Refills: 3 | Status: ON HOLD | OUTPATIENT
Start: 2018-02-13 | End: 2018-10-25 | Stop reason: HOSPADM

## 2018-02-13 RX ORDER — HYDRALAZINE HYDROCHLORIDE 25 MG/1
25 TABLET, FILM COATED ORAL 3 TIMES DAILY
Status: CANCELLED
Start: 2018-02-13

## 2018-02-13 RX ADMIN — RANOLAZINE 500 MG: 500 TABLET, FILM COATED, EXTENDED RELEASE ORAL at 09:02

## 2018-02-13 RX ADMIN — OXYCODONE HYDROCHLORIDE AND ACETAMINOPHEN 1 TABLET: 10; 325 TABLET ORAL at 06:02

## 2018-02-13 RX ADMIN — FUROSEMIDE 40 MG: 40 TABLET ORAL at 09:02

## 2018-02-13 RX ADMIN — POTASSIUM CHLORIDE 10 MEQ: 750 CAPSULE, EXTENDED RELEASE ORAL at 09:02

## 2018-02-13 RX ADMIN — AMIODARONE HYDROCHLORIDE 200 MG: 200 TABLET ORAL at 09:02

## 2018-02-13 RX ADMIN — SACUBITRIL AND VALSARTAN 1 TABLET: 49; 51 TABLET, FILM COATED ORAL at 09:02

## 2018-02-13 RX ADMIN — ISOSORBIDE MONONITRATE 30 MG: 30 TABLET, EXTENDED RELEASE ORAL at 09:02

## 2018-02-13 RX ADMIN — METOPROLOL SUCCINATE 12.5 MG: 25 TABLET, EXTENDED RELEASE ORAL at 09:02

## 2018-02-13 RX ADMIN — ATORVASTATIN CALCIUM 40 MG: 20 TABLET, FILM COATED ORAL at 09:02

## 2018-02-13 RX ADMIN — CEFAZOLIN SODIUM 2 G: 1 INJECTION, POWDER, FOR SOLUTION INTRAMUSCULAR; INTRAVENOUS at 02:02

## 2018-02-13 NOTE — ASSESSMENT & PLAN NOTE
62 year old gentleman with a hx of CAD s/p CABG 2014, ICM 38%, CHB, BiV-ICD (biotronik), AF/AFL, HTN, HLD, MVR prosthetic who presented on 1/27 after a syncopal event. Device interrogated and he was noted to be in VT for which he received appropriate shock. Also had RV lead dysfunction and pauses on interrogation did not result in syncope. No LHC was done there but he had one in 08/2017 without any significant changes. He was started on Amiodarone 400 BID and transferred here for possible lead extraction vs VT ablation.     Extraction and reimplantation done 2/12. No events overnight. Site looks good. Ok for discharge. Will need cephalexin for 5 days. Follow up at device clinic in 10 days or at home device clinic. Sling for 48 hours, then only nightly for 6 weeks. No lifting arm above head.

## 2018-02-13 NOTE — ASSESSMENT & PLAN NOTE
- History of complete AV block, now with pacemaker s/p device extraction/ revision 2/12/2018  - Please see AICD device malfunction

## 2018-02-13 NOTE — PLAN OF CARE
Patient ready for discharge home but needs transportation assist; has no family that can drive. Transportation arranged via \[facility]\"" ([phone]), ETA 1-2 hrs. Patient could also benefit from short term home health for assist at home. Patient previously with [facility] and would like them again. CM called and spoke with Elza at [facility] ([phone]), they can accept patient. Face sheet, H&P, EP note, Discharge Summary and HH orders sent via .

## 2018-02-13 NOTE — ASSESSMENT & PLAN NOTE
- Continue Entrestro 49-51, Toprol 12.5 and ISMN 30  - BP low-normal during admission, ISMN dose reduced to 30mg qd  - Follow up with PCP in 1 week for BP check and lab check

## 2018-02-13 NOTE — ASSESSMENT & PLAN NOTE
- History of monomorphic vtach with history of ICD/pacermaker  - Transfer from [facility] where interrogation of device significant for malfunction of the pacing portion of the right ventricular lead resulting in noise on the RV lead channel.  - S/p device extraction/ revision 2/12/2018  - No further syncope, no chest pain, and no events overnight, feels well and ready for discharge    - Discharge home today with Home Health   - Discharged with 5 day course of cefadroxil 500mg BID  - Continue amiodarone 200mg BID  - Tylenol PRN for analgesia  - Must wear sling for next 48 hours, then must wear at night for next 6 weeks  - Must not lift arm above head   - Wound check with Cardiology in 1 week then follow up with Dr. Fitzgerald in clinic in 1 month   - Continue to hold rivaroxaban for next 5 days (restart on 2/18/2018)

## 2018-02-13 NOTE — ASSESSMENT & PLAN NOTE
- Patient is asymptomatic and stable  - Continue ranolazine 500mg BID  - Continue ISMN, dose reduced to 30mg qd due to low BP during admission, follow up with PCP in 1 week for blood pressure and lab check (CBC and CMP)  - Continue PRN SL nitroglycerin

## 2018-02-13 NOTE — NURSING
Patient is ready for discharge. Patient stable alert and oriented.Tele, Visi, and  IVs removed. No complaints of pain. Left arm maintained in sling , and appropriate instruction re-inforced.  Discussed discharge plan. Reviewed medications and side effects, appointments, and answered questions with patient.

## 2018-02-13 NOTE — PLAN OF CARE
02/13/18 0945   Discharge Assessment   Assessment Type Discharge Planning Assessment   Confirmed/corrected address and phone number on facesheet? Yes   Assessment information obtained from? Patient;Medical Record   Expected Length of Stay (days) 3   Communicated expected length of stay with patient/caregiver yes   Prior to hospitilization cognitive status: Alert/Oriented   Prior to hospitalization functional status: Assistive Equipment   Current cognitive status: Alert/Oriented   Current Functional Status: Assistive Equipment   Lives With alone   Able to Return to Prior Arrangements yes   Is patient able to care for self after discharge? Yes   Who are your caregiver(s) and their phone number(s)? sister helps   Patient's perception of discharge disposition home health   Readmission Within The Last 30 Days no previous admission in last 30 days   Patient currently being followed by outpatient case management? No   Patient currently receives any other outside agency services? No   Equipment Currently Used at Home rollator   Do you have any problems affording any of your prescribed medications? No   Is the patient taking medications as prescribed? yes   Does the patient have transportation home? No   Transportation Available (needs [facility] to provide)   Does the patient receive services at the Coumadin Clinic? No   Discharge Plan A Home;Home Health   Patient/Family In Agreement With Plan yes

## 2018-02-13 NOTE — SUBJECTIVE & OBJECTIVE
Interval History: Feeling well, slight pain.     Review of Systems   All other systems reviewed and are negative.    Objective:     Vital Signs (Most Recent):  Temp: 98.6 °F (37 °C) (02/12/18 1918)  Pulse: 60 (02/13/18 0708)  Resp: 20 (02/13/18 0350)  BP: 109/78 (02/13/18 0350)  SpO2: (!) 92 % (02/13/18 0350) Vital Signs (24h Range):  Temp:  [98.5 °F (36.9 °C)-99 °F (37.2 °C)] 98.6 °F (37 °C)  Pulse:  [59-60] 60  Resp:  [17-37] 20  SpO2:  [90 %-99 %] 92 %  BP: ()/(55-78) 109/78  Arterial Line BP: (78-83)/(51-57) 78/51     Weight: 105.2 kg (231 lb 14.8 oz)  Body mass index is 34.25 kg/m².     SpO2: (!) 92 %  O2 Device (Oxygen Therapy): room air    Physical Exam  GEN: Alert and oriented in NAD  NECK: no JVD appreciated  CVS: RRR, s1/s2,   ABD: NT/ND BS +  Extremities: warm and dry, palpable pulses, no edema  NEURO: Alert and oriented x 3  PSYCH: appropriate affect.         Significant Labs: All pertinent lab results from the last 24 hours have been reviewed.    Significant Imaging: reviewed

## 2018-02-13 NOTE — SUBJECTIVE & OBJECTIVE
Review of Systems   Constitutional: Negative for chills, fever and unexpected weight change.   Respiratory: Negative for cough, shortness of breath and wheezing.    Cardiovascular: Negative for chest pain, palpitations and leg swelling.   Gastrointestinal: Negative for abdominal pain, blood in stool, constipation, diarrhea, nausea and vomiting.   Genitourinary: Negative for dysuria and hematuria.   Skin: Positive for wound.   Neurological: Negative for dizziness, tremors, seizures, syncope, weakness and headaches.     Objective:     Vital Signs (Most Recent):  Temp: 98.6 °F (37 °C) (02/12/18 1918)  Pulse: 60 (02/13/18 0709)  Resp: 20 (02/13/18 0350)  BP: 112/80 (02/13/18 0709)  SpO2: (!) 90 % (02/13/18 0709) Vital Signs (24h Range):  Temp:  [98.5 °F (36.9 °C)-99 °F (37.2 °C)] 98.6 °F (37 °C)  Pulse:  [59-60] 60  Resp:  [17-37] 20  SpO2:  [90 %-99 %] 90 %  BP: ()/(55-80) 112/80  Arterial Line BP: (78-83)/(51-57) 78/51     Weight: 105.2 kg (231 lb 14.8 oz)  Body mass index is 34.25 kg/m².    Intake/Output Summary (Last 24 hours) at 02/13/18 0931  Last data filed at 02/13/18 0600   Gross per 24 hour   Intake             1290 ml   Output             1200 ml   Net               90 ml      Physical Exam   Constitutional: He is oriented to person, place, and time. No distress.   Cardiovascular: Normal rate, regular rhythm and normal heart sounds.  Exam reveals no friction rub.    Pulmonary/Chest: Effort normal and breath sounds normal. He has no wheezes. He has no rales.   Abdominal: Soft. He exhibits no distension. There is no tenderness.   Musculoskeletal: He exhibits no edema or tenderness.   Left arm in sling   Neurological: He is alert and oriented to person, place, and time.   Skin: Skin is warm. He is not diaphoretic.   Device incision site c/d/i   Psychiatric: He has a normal mood and affect.   Nursing note and vitals reviewed.      Significant Labs:   BMP:     Recent Labs  Lab 02/13/18  0626   GLU 97   NA  138   K 3.8      CO2 25   BUN 18   CREATININE 1.1   CALCIUM 8.7   MG 2.0     CBC:     Recent Labs  Lab 02/12/18  0639 02/13/18  0626   WBC 4.79 7.15   HGB 12.8* 11.8*   HCT 38.6* 35.6*    159

## 2018-02-13 NOTE — ASSESSMENT & PLAN NOTE
- Patient is asymptomatic and stable  - Continue Entrestro 49-51   - Continue furosemide 40mg PO BID with K supplementation 10 mEQ BID  - Conitnue Toprol 12.5  - Continue ISMN 30  - Digoxin stopped during admission by EP

## 2018-02-13 NOTE — PROGRESS NOTES
[facility]  Cardiac Electrophysiology  Progress Note    Admission Date: 2/9/2018  Code Status: Full Code   Attending Physician: Tre Can MD   Expected Discharge Date: 2/14/2018  Principal Problem:AICD lead malfunction    Subjective:     Interval History: Feeling well, slight pain.     Review of Systems   All other systems reviewed and are negative.    Objective:     Vital Signs (Most Recent):  Temp: 98.6 °F (37 °C) (02/12/18 1918)  Pulse: 60 (02/13/18 0708)  Resp: 20 (02/13/18 0350)  BP: 109/78 (02/13/18 0350)  SpO2: (!) 92 % (02/13/18 0350) Vital Signs (24h Range):  Temp:  [98.5 °F (36.9 °C)-99 °F (37.2 °C)] 98.6 °F (37 °C)  Pulse:  [59-60] 60  Resp:  [17-37] 20  SpO2:  [90 %-99 %] 92 %  BP: ()/(55-78) 109/78  Arterial Line BP: (78-83)/(51-57) 78/51     Weight: 105.2 kg (231 lb 14.8 oz)  Body mass index is 34.25 kg/m².     SpO2: (!) 92 %  O2 Device (Oxygen Therapy): room air    Physical Exam  GEN: Alert and oriented in NAD  NECK: no JVD appreciated  CVS: RRR, s1/s2,   ABD: NT/ND BS +  Extremities: warm and dry, palpable pulses, no edema  NEURO: Alert and oriented x 3  PSYCH: appropriate affect.         Significant Labs: All pertinent lab results from the last 24 hours have been reviewed.    Significant Imaging: reviewed    Assessment and Plan:     * AICD lead malfunction    62 year old gentleman with a hx of CAD s/p CABG 2014, ICM 38%, CHB, BiV-ICD (biotronik), AF/AFL, HTN, HLD, MVR prosthetic who presented on 1/27 after a syncopal event. Device interrogated and he was noted to be in VT for which he received appropriate shock. Also had RV lead dysfunction and pauses on interrogation did not result in syncope. No LHC was done there but he had one in 08/2017 without any significant changes. He was started on Amiodarone 400 BID and transferred here for possible lead extraction vs VT ablation.     Extraction and reimplantation done 2/12. No events overnight. Site looks good. Ok for  discharge. Will need cephalexin for 5 days. Follow up at device clinic in 10 days or at home device clinic. Sling for 48 hours, then only nightly for 6 weeks. No lifting arm above head.             Sean Gómez MD  Cardiac Electrophysiology  [facility]

## 2018-02-13 NOTE — DISCHARGE SUMMARY
[facility] Hospital Medicine  Discharge Summary      Patient Name: Quentin Mckeon  MRN: [id number]  Admission Date: 2/9/2018  Hospital Length of Stay: 4 days  Discharge Date and Time:  02/13/2018 9:32 AM  Attending Physician: Tre Can MD   Discharging Provider: Steffany Gibbs MD  Primary Care Provider: Primary Doctor [facility] Medicine Team: [facility] HOSP MED 4 Steffany Gibbs MD    HPI:   Patient is a 64 yo M with DCM s/p bi V AICD, CABG 2014, HFrEF (EF 38, echo 8/2017), HTN, MV biprothetic valve repair, Afib/Aflutter, Vfib/Vtach, former EtOH abuse, tobacco use,  who presents with a ventricular lead that is not capturing intermittently. On 1/27/18 the patient was at home watching television on the side of his bed when he passed out. This came out of nowhere and he fell back onto the bed and the next thing he knew he was coming to which he estimated at just a few seconds. Patient did not lose continence or have any jerking movements he was aware of. While coming to he was a bit confused but realized something was wrong, called 911 and got transported to the hospital. While in the hospital, the patient had his leads interrogated and showed a V tach episode with appropriate shock. A CT head was negative done at this time. Once patient was at the hospital, there is a note from cardiology about chest pain radiating down his arm and him taking nitro with no relief, but patient denies this to me. Cardiology consultation at [place] noted that he has malfunction of the pacing portion of the right ventricular lead resulting in noise on the RV lead channel.  Pt has a heart block with intermittent non capture pauses of 2.2 seconds. This was deemed to be not leading to his syncope as that is a result of ventricular arrhythmia which they say it may be reasonable to consider an endocardial VT ablation with assistance of impella or IABP for recurrent hemodynamically unstable sustained V tach despite amiodarone  therapy requiring ICD shock for termination in the setting of chronic systolic dysfunction.   Patient became febrile on 1/31/18 and ID was consulted. They diagnosed pt with the flu while in hospital. BCx and UCx were negative and he was treated with tamiflu. Pt also had a TTE showinga mobile echogenic mass attached to his pacer wire. 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 Vanc and rocephin were also started, but stopped on 2/1/18 when it was determined that the vegetation was not an infection.      At [facility] 2/9/18 the patient has no complaints and is asymptomatic. He has recovered from the flu and is awaiting a consult with EP for R ventricular lead replacement in his AICD. Of note patient is former alcoholic and only other syncope episode was while he was drinking. He was at that time drinking 1/5 of elisabeth posadas per day. He has been sober for 1 year now.     Procedure(s) (LRB):  EXTRACTION-LEAD (N/A)      Hospital Course:   2/9/2018: Admission date. EP contacted who state that there is RV lead dyfunction with plan of exchange and revision on Monday 1/12 with CTS back-up  02/10/2018: No acute events overnight. Patient denies chest pain, shortness of breath, palpitations. Without fevers or chills.   02/11/2018: NAEON. Awaiting possible procedure Monday. Denies chest pain, SOB, palpitations.   02/12/2018: NAEON. Awaiting procedure today. No subjective complaints.  02/13/2018: ICD revision/replacement yesterday. NAEON, some minor pain, well controlled with oral medication. No chest pain/ syncope/ dizziness. No nausea/vomiting. Feels well and ready for discharge home.      Consults:   Consults         Status Ordering Provider     Inpatient consult to Electrophysiology  Once     Provider:  (Not yet assigned)    Completed JE MELTON        Review of Systems   Constitutional: Negative for chills, fever and unexpected weight change.   Respiratory: Negative for  cough, shortness of breath and wheezing.    Cardiovascular: Negative for chest pain, palpitations and leg swelling.   Gastrointestinal: Negative for abdominal pain, blood in stool, constipation, diarrhea, nausea and vomiting.   Genitourinary: Negative for dysuria and hematuria.   Skin: Positive for wound.   Neurological: Negative for dizziness, tremors, seizures, syncope, weakness and headaches.      Objective:      Vital Signs (Most Recent):  Temp: 98.6 °F (37 °C) (02/12/18 1918)  Pulse: 60 (02/13/18 0709)  Resp: 20 (02/13/18 0350)  BP: 112/80 (02/13/18 0709)  SpO2: (!) 90 % (02/13/18 0709) Vital Signs (24h Range):  Temp:  [98.5 °F (36.9 °C)-99 °F (37.2 °C)] 98.6 °F (37 °C)  Pulse:  [59-60] 60  Resp:  [17-37] 20  SpO2:  [90 %-99 %] 90 %  BP: ()/(55-80) 112/80  Arterial Line BP: (78-83)/(51-57) 78/51      Weight: 105.2 kg (231 lb 14.8 oz)  Body mass index is 34.25 kg/m².     Intake/Output Summary (Last 24 hours) at 02/13/18 0931  Last data filed at 02/13/18 0600    Gross per 24 hour   Intake             1290 ml   Output             1200 ml   Net               90 ml      Physical Exam   Constitutional: He is oriented to person, place, and time. No distress.   Cardiovascular: Normal rate, regular rhythm and normal heart sounds.  Exam reveals no friction rub.    Pulmonary/Chest: Effort normal and breath sounds normal. He has no wheezes. He has no rales.   Abdominal: Soft. He exhibits no distension. There is no tenderness.   Musculoskeletal: He exhibits no edema or tenderness.   Left arm in sling   Neurological: He is alert and oriented to person, place, and time.   Skin: Skin is warm. He is not diaphoretic.   Device incision site c/d/i   Psychiatric: He has a normal mood and affect.   Nursing note and vitals reviewed.      * AICD lead malfunction    - History of monomorphic vtach with history of ICD/pacermaker  - Transfer from [facility] where interrogation of device significant for malfunction of the  pacing portion of the right ventricular lead resulting in noise on the RV lead channel.  - S/p device extraction/ revision 2/12/2018  - No further syncope, no chest pain, and no events overnight, feels well and ready for discharge    - Discharge home today with Home Health   - Discharged with 5 day course of cefadroxil 500mg BID  - Continue amiodarone 200mg BID  - Tylenol PRN for analgesia  - Must wear sling for next 48 hours, then must wear at night for next 6 weeks  - Must not lift arm above head   - Wound check with Cardiology in 1 week then follow up with Dr. Fitzgerald in clinic in 1 month   - Continue to hold rivaroxaban for next 5 days (restart on 2/18/2018)        Atrial fibrillation    - ChadVASC 2- Now with paced rhythm  - Continue to hold rivaroxaban for next 5 days (restart on 2/18/2018)  - Continue Toprol 12.5mg qd        Ventricular tachycardia, monomorphic    - S/p device extraction/ revision, please see above  - Continue amiodarone 200mg BID        Chronic stable angina    - Patient is asymptomatic and stable  - Continue ranolazine 500mg BID  - Continue ISMN, dose reduced to 30mg qd due to low BP during admission, follow up with PCP in 1 week for blood pressure and lab check (CBC and CMP)  - Continue PRN SL nitroglycerin         Chronic anticoagulation    Please see A fib        Chronic systolic heart failure    - Patient is asymptomatic and stable  - Continue Entrestro 49-51   - Continue furosemide 40mg PO BID with K supplementation 10 mEQ BID  - Conitnue Toprol 12.5  - Continue ISMN 30  - Digoxin stopped during admission by EP        HLD (hyperlipidemia)    - Continue home atorvastatin 40 mg PO daily        Essential hypertension    - Continue Entrestro 49-51, Toprol 12.5 and ISMN 30  - BP low-normal during admission, ISMN dose reduced to 30mg qd  - Follow up with PCP in 1 week for BP check and lab check        Heart block    - History of complete AV block, now with pacemaker s/p device extraction/  revision 2/12/2018  - Please see AICD device malfunction        Elevated troponin-resolved as of 2/13/2018    - TnI .37, on admission, suspect related to ICD discharge  - No chest pain, EKG with AV paced rhythm.   - Patient asymptomatic, monitor          Final Active Diagnoses:    Diagnosis Date Noted POA    PRINCIPAL PROBLEM:  AICD lead malfunction [T82.110A] 02/09/2018 Yes    Ventricular tachycardia, monomorphic [I47.2] 02/10/2018 Unknown    Atrial fibrillation [I48.91] 02/10/2018 Yes    Essential hypertension [I10] 02/09/2018 Yes    HLD (hyperlipidemia) [E78.5] 02/09/2018 Yes    Chronic systolic heart failure [I50.22] 02/09/2018 Yes    Chronic stable angina [I20.8] 02/09/2018 Yes    Chronic anticoagulation [Z79.01] 02/09/2018 Not Applicable    Heart block [I45.9] 02/07/2018 Yes      Problems Resolved During this Admission:    Diagnosis Date Noted Date Resolved POA    Elevated troponin [R74.8] 02/09/2018 02/13/2018 Yes       Discharged Condition: good    Disposition: Home or Self Care    Follow Up:  Follow-up Information     Primary Doctor No. Schedule an appointment as soon as possible for a visit in 1 week.    Why:  For blood pressure and lab check (please have labs drawn prior to appointment)           [facility] CARDIOLOGY. Go in 1 week.    Specialty:  Cardiology  Why:  For wound re-check in 1 week  Contact information:  [address] [facility] [address]  [phone]           Jose Fitzgerald MD. Go in 1 month.    Specialties:  Electrophysiology, Cardiology  Why:  For Cardiology follow up  Contact information:  [address] [facility] [address]  [phone]                 Patient Instructions:     Comprehensive metabolic panel   Standing Status: Future  Standing Exp. Date: 04/14/19     CBC auto differential   Standing Status: Future  Standing Exp. Date: 04/14/19     Other restrictions (specify):   Order Comments: Keep sling on for 48 hours, then wear sling at night only for  6 weeks.     Lifting restrictions   Order Comments: Do not lift left arm above head.     Notify your health care provider if you experience any of the following:  temperature >100.4     Notify your health care provider if you experience any of the following:  persistent nausea and vomiting or diarrhea     Notify your health care provider if you experience any of the following:  severe uncontrolled pain     Notify your health care provider if you experience any of the following:  redness, tenderness, or signs of infection (pain, swelling, redness, odor or green/yellow discharge around incision site)     Notify your health care provider if you experience any of the following:  difficulty breathing or increased cough     Notify your health care provider if you experience any of the following:  severe persistent headache     Notify your health care provider if you experience any of the following:  worsening rash     Notify your health care provider if you experience any of the following:  persistent dizziness, light-headedness, or visual disturbances     Notify your health care provider if you experience any of the following:  increased confusion or weakness         Significant Diagnostic Studies: Labs:   CMP   Recent Labs  Lab 02/12/18  0639 02/13/18  0626    138   K 3.9 3.8    103   CO2 24 25   GLU 88 97   BUN 11 18   CREATININE 0.9 1.1   CALCIUM 10.1 8.7   PROT 7.1 6.5   ALBUMIN 3.5 3.1*   BILITOT 0.7 0.4   ALKPHOS 72 67   AST 25 24   ALT 29 23   ANIONGAP 10 10   ESTGFRAFRICA >60.0 >60.0   EGFRNONAA >60.0 >60.0   , CBC   Recent Labs  Lab 02/12/18  0639 02/13/18  0626   WBC 4.79 7.15   HGB 12.8* 11.8*   HCT 38.6* 35.6*    159   , INR   Lab Results   Component Value Date    INR 1.0 02/11/2018   ,  and All labs within the past 24 hours have been reviewed    Radiology:   Imaging Results          X-Ray Chest AP Portable (Final result)  Result time 02/12/18 15:08:20    Final result by Joe Fernandez  III, MD (02/12/18 15:08:20)                 Narrative:    One view: There is a pacer. There is cardiomegaly, aortic plaque, moderate edema, and worsening.      Electronically signed by: JEN HURTADO MD  Date:     02/12/18  Time:    15:08                              X-Ray Chest PA And Lateral (Final result)  Result time 02/09/18 16:46:43    Final result by Ramon Greene MD (02/09/18 16:46:43)                 Impression:        As above.       Electronically signed by: Dr. Ramon Greene MD  Date:     02/09/18  Time:    16:46              Narrative:    TWO VIEW CHEST:     Comparison: None.    Findings:    Pacer.  Sternotomy.The cardiac silhouette and the pulmonary vasculature are enlarged in size.  The mediastinal and hilar contours are unremarkable.  There is no pneumothorax.  No pleural effusion.  The lungs are clear.  No acute bony abnormality.                             Xray Previous (Final result)  Result time 02/09/18 11:51:32               CT Previous (Final result)  Result time 02/09/18 11:51:03                Cardiac Graphics: Echocardiogram:   2D echo with color flow doppler:   Results for orders placed or performed during the hospital encounter of 02/09/18   2D echo with color flow doppler   Result Value Ref Range    EF 38 (A) 55 - 65    Mitral Valve Regurgitation TRIVIAL     Aortic Valve Regurgitation TRIVIAL     Est. PA Systolic Pressure 43.96 (A)     Tricuspid Valve Regurgitation MILD        Pending Diagnostic Studies:     None         Medications:  Reconciled Home Medications:   Current Discharge Medication List      START taking these medications    Details   acetaminophen (TYLENOL) 325 MG tablet Take 2 tablets (650 mg total) by mouth every 4 (four) hours as needed.  Refills: 0      cefadroxil (DURICEF) 500 MG Cap Take 1 capsule (500 mg total) by mouth every 12 (twelve) hours. For 5 days  Qty: 10 capsule, Refills: 0         CONTINUE these medications which have CHANGED    Details   amiodarone  (PACERONE) 200 MG Tab Take 1 tablet (200 mg total) by mouth 2 (two) times daily. Take 200mg twice a day until seen by Dr. Fitzgerald in clinic  Qty: 60 tablet, Refills: 3      furosemide (LASIX) 40 MG tablet Take 1 tablet (40 mg total) by mouth 2 (two) times daily.  Qty: 60 tablet, Refills: 3      hydrALAZINE (APRESOLINE) 25 MG tablet Take 1 tablet (25 mg total) by mouth 3 (three) times daily. Do not take this medicine until you see your PCP in 1 week      isosorbide mononitrate (IMDUR) 30 MG 24 hr tablet Take 1 tablet (30 mg total) by mouth once daily.  Qty: 30 tablet, Refills: 3      rivaroxaban (XARELTO) 20 mg Tab Take 1 tablet (20 mg total) by mouth once daily. Do not take this medication for 5 days after procedure (restart on 2/18/2018)         CONTINUE these medications which have NOT CHANGED    Details   albuterol (PROAIR HFA) 90 mcg/actuation inhaler Inhale 2 puffs into the lungs every 6 (six) hours as needed for Wheezing. Rescue      atorvastatin (LIPITOR) 40 MG tablet Take 40 mg by mouth once daily.      metoprolol succinate (TOPROL-XL) 25 MG 24 hr tablet Take 12.5 mg by mouth once daily.      nitroGLYCERIN (NITROSTAT) 0.4 MG SL tablet Place 0.4 mg under the tongue every 5 (five) minutes as needed for Chest pain.      oxyCODONE-acetaminophen (PERCOCET)  mg per tablet Take 1 tablet by mouth every 12 (twelve) hours as needed for Pain.      potassium chloride (KLOR-CON) 10 MEQ TbSR Take 10 mEq by mouth 2 (two) times daily.      ranolazine (RANEXA) 500 MG Tb12 Take 500 mg by mouth 2 (two) times daily.      sacubitril-valsartan (ENTRESTO) 49-51 mg per tablet Take 1 tablet by mouth 2 (two) times daily.         STOP taking these medications       digoxin (LANOXIN) 250 mcg tablet Comments:   Reason for Stopping:               Indwelling Lines/Drains at time of discharge:   Lines/Drains/Airways          No matching active lines, drains, or airways          Time spent on the discharge of patient: >30  minutes  Patient was seen and examined on the date of discharge and determined to be suitable for discharge.         Steffany Gibbs MD  Department of Hospital Medicine  [facility]

## 2018-02-13 NOTE — ASSESSMENT & PLAN NOTE
- ChadVASC 2- Now with paced rhythm  - Continue to hold rivaroxaban for next 5 days (restart on 2/18/2018)  - Continue Toprol 12.5mg qd

## 2018-02-13 NOTE — DISCHARGE INSTRUCTIONS
Please make sure you understand all medication changes prior to discharge.    Please wear the sling at all times for the next 48 hours, after that you make take the sling off during the day but must still wear it at night for 6 weeks.    You must not lift your arm above your head until told it is ok to do so by Cardiology    Please make an appointment to see your primary care doctor in 1 week. You should have labs drawn prior to this appointment.     You should come to [facility] in 1 week for a wound check, and then again in 1 month to see Dr. Fitzgerald.    You have been given a prescription for an antibiotic, please take this medication twice a day for the next 5 days.    You should not take your Xarelto for the next 5 days, you can restart Xarelto on 2/18/2018.    Please call [facility] if you have any questions or concerns after discharge.

## 2018-02-13 NOTE — PLAN OF CARE
[facility]    HOME HEALTH ORDERS  FACE TO FACE ENCOUNTER    Patient Name: Quentin Mckeon  YOB: 1955    PCP: Primary Doctor No   PCP Address: None  PCP Phone Number: None  PCP Fax: None    Encounter Date: 02/13/2018    Admit to Home Health    Diagnoses:  Active Hospital Problems    Diagnosis  POA    *AICD lead malfunction [T82.110A]  Yes    Ventricular tachycardia, monomorphic [I47.2]  Unknown    Atrial fibrillation [I48.91]  Yes    Essential hypertension [I10]  Yes    HLD (hyperlipidemia) [E78.5]  Yes    Chronic systolic heart failure [I50.22]  Yes    Chronic stable angina [I20.8]  Yes    Chronic anticoagulation [Z79.01]  Not Applicable    Heart block [I45.9]  Yes      Resolved Hospital Problems    Diagnosis Date Resolved POA    Elevated troponin [R74.8] 02/13/2018 Yes       No future appointments.  Follow-up Information     Primary Doctor No. Schedule an appointment as soon as possible for a visit in 1 week.    Why:  For blood pressure and lab check (please have labs drawn prior to appointment)           [facility] CARDIOLOGY. Go in 1 week.    Specialty:  Cardiology  Why:  For wound re-check in 1 week  Contact information:  [address] [facility] [id number] [phone]           Jose Fitzgerald MD. Go in 1 month.    Specialties:  Electrophysiology, Cardiology  Why:  For Cardiology follow up  Contact information:  [address] [facility] [address]  [phone]                     I have seen and examined this patient face to face today. My clinical findings that support the need for the home health skilled services and home bound status are the following:  Weakness/numbness causing balance and gait disturbance due to Heart Failure and Surgery making it taxing to leave home.  Medical restrictions requiring assistance of another human to leave home due to  Weight bearing restricted.    Allergies:Review of patient's allergies indicates:  No Known  Allergies    Diet: cardiac diet    Activities: no lifting, Driving, or Strenuous exercise for 6 weeks.   Must wear sling at all times for next 48 hours, and then at night for next 6 weeks.   Must not lift arm above head.     Nursing:   SN to complete comprehensive assessment including routine vital signs. Instruct on disease process and s/s of complications to report to MD. Review/verify medication list sent home with the patient at time of discharge  and instruct patient/caregiver as needed. Frequency may be adjusted depending on start of care date.    Notify MD if SBP > 160 or < 90; DBP > 90 or < 50; HR > 120 or < 50; Temp > 101; Other:   Severe uncontrolled pain or signs of infection around AICD insertion site.      CONSULTS:    Physical Therapy to evaluate and treat. Evaluate for home safety and equipment needs; Establish/upgrade home exercise program. Perform / instruct on therapeutic exercises, gait training, transfer training, and Range of Motion.  Occupational Therapy to evaluate and treat. Evaluate home environment for safety and equipment needs. Perform/Instruct on transfers, ADL training, ROM, and therapeutic exercises.   to evaluate for community resources/long-range planning.  Aide to provide assistance with personal care, ADLs, and vital signs.    MISCELLANEOUS CARE:  Heart Failure:      SN to instruct on the following:    Instruct on the definition of CHF.   Instruct on the signs/sympoms of CHF to be reported.   Instruct on and monitor daily weights.   Instruct on factors that cause exacerbation.   Instruct on action, dose, schedule, and side effects of medications.   Instruct on diet as prescribed.   Instruct on activity allowed.   Instruct on life-style modifications for life long management of CHF   SN to assess compliance with daily weights, diet, medications, fluid retention,    safety precautions, activities permitted and life-style modifications.   Additional 1-2 SN visits per week  as needed for signs and symptoms     of CHF exacerbation.      For Weight Gain > 2-3 lbs in 1 day or 4-6 lbs over 1 week notify PCP and:  Increase furosemide (oral diuretic) dose to 60mg PO BID for 5 days temporarily     WOUND CARE ORDERS  yes:  Surgical Wound:  Location: Left upper chest wall    Consult ET nurse         Other: assess wound for signs of infection and notify MD if redness/ swelling/ severe pain (frequency)      Medications: Review discharge medications with patient and family and provide education.      Current Discharge Medication List      START taking these medications    Details   acetaminophen (TYLENOL) 325 MG tablet Take 2 tablets (650 mg total) by mouth every 4 (four) hours as needed.  Refills: 0      cefadroxil (DURICEF) 500 MG Cap Take 1 capsule (500 mg total) by mouth every 12 (twelve) hours. For 5 days  Qty: 10 capsule, Refills: 0         CONTINUE these medications which have CHANGED    Details   amiodarone (PACERONE) 200 MG Tab Take 1 tablet (200 mg total) by mouth 2 (two) times daily. Take 200mg twice a day until seen by Dr. Fitzgerald in clinic  Qty: 60 tablet, Refills: 3      furosemide (LASIX) 40 MG tablet Take 1 tablet (40 mg total) by mouth 2 (two) times daily.  Qty: 60 tablet, Refills: 3      hydrALAZINE (APRESOLINE) 25 MG tablet Take 1 tablet (25 mg total) by mouth 3 (three) times daily. Do not take this medicine until you see your PCP in 1 week      isosorbide mononitrate (IMDUR) 30 MG 24 hr tablet Take 1 tablet (30 mg total) by mouth once daily.  Qty: 30 tablet, Refills: 3      rivaroxaban (XARELTO) 20 mg Tab Take 1 tablet (20 mg total) by mouth once daily. Do not take this medication for 5 days after procedure (restart on 2/18/2018)         CONTINUE these medications which have NOT CHANGED    Details   albuterol (PROAIR HFA) 90 mcg/actuation inhaler Inhale 2 puffs into the lungs every 6 (six) hours as needed for Wheezing. Rescue      atorvastatin (LIPITOR) 40 MG tablet Take 40  mg by mouth once daily.      metoprolol succinate (TOPROL-XL) 25 MG 24 hr tablet Take 12.5 mg by mouth once daily.      nitroGLYCERIN (NITROSTAT) 0.4 MG SL tablet Place 0.4 mg under the tongue every 5 (five) minutes as needed for Chest pain.      oxyCODONE-acetaminophen (PERCOCET)  mg per tablet Take 1 tablet by mouth every 12 (twelve) hours as needed for Pain.      potassium chloride (KLOR-CON) 10 MEQ TbSR Take 10 mEq by mouth 2 (two) times daily.      ranolazine (RANEXA) 500 MG Tb12 Take 500 mg by mouth 2 (two) times daily.      sacubitril-valsartan (ENTRESTO) 49-51 mg per tablet Take 1 tablet by mouth 2 (two) times daily.         STOP taking these medications       digoxin (LANOXIN) 250 mcg tablet Comments:   Reason for Stopping:               I certify that this patient is confined to his home and needs intermittent skilled nursing care, physical therapy and occupational therapy.

## 2018-02-14 NOTE — PHYSICIAN QUERY
PT Name: Quentin Mckeon  MR #: [id number]    Physician Query Form - Atrial Fibrillation Specificity     CDS/: Elisabeth Gardner               Contact information:[email or web address]     This form is a permanent document in the medical record.     Query Date: February 14, 2018    By submitting this query, we are merely seeking further clarification of documentation. Please utilize your independent clinical judgment when addressing the question(s) below.    The medical record contains the following:   Indicators     Supporting Clinical Findings Location in Medical Record   x Atrial Fibrillation Atrial fibrillation  HM note 2-10   x EKG results EKG with AV paced rhythm HM note 2-10   x Medication Toprol, Digoxin, Isosorbide dinitrate   Holding Eloquis  HM note 2-10    Treatment      Other         Provider, please further specify the Atrial Fibrillation diagnosis.    [ X ] Chronic  [  ] Other (please specify): ____________________________  [  ] Clinically Undetermined    Please document in your progress notes daily for the duration of treatment until resolved, and include in your discharge summary.     No

## 2018-02-28 NOTE — PLAN OF CARE
Pt d/c to home w/ h/h - [facility].     02/28/18 0914   Final Note   Assessment Type Final Discharge Note   Discharge Disposition Home-Health   What phone number can be called within the next 1-3 days to see how you are doing after discharge? [phone]   Right Care Referral Info   Post Acute Recommendation Home-care   Referral Type h/h   Facility Name [facility]

## 2018-09-24 ENCOUNTER — DOCUMENTATION ONLY (OUTPATIENT)
Dept: CARDIOTHORACIC SURGERY | Facility: CLINIC | Age: 63
End: 2018-09-24

## 2018-09-24 NOTE — PROGRESS NOTES
Called pt to schedule consult w Dr. Ortiz-pt will call back once transportation is sorted out. Records in media, NITA and C discs +reports will be sent to CTS from [facility] [place] ([phone]).

## 2018-10-10 ENCOUNTER — DOCUMENTATION ONLY (OUTPATIENT)
Dept: CARDIOTHORACIC SURGERY | Facility: CLINIC | Age: 63
End: 2018-10-10

## 2018-10-17 PROBLEM — I05.0 SEVERE MITRAL VALVE STENOSIS: Status: ACTIVE | Noted: 2018-10-17

## 2018-10-17 NOTE — PLAN OF CARE
[facility] Transfer Acceptance Note:     Physician or Mid Level Provider/Speciality: Dr. Spencer Sprague     Facility/Hospital: [facility]     Accepting Physician: Jennifer Johnson MD      Date of Acceptance: 10/16/2018     Reason for Transfer to [facility]: severe mitral valve stenosis, AICD lead repair      Report from Transferring Physician or Mid-Level provider/Hospital course: Pt is a 64 y/o AAM with PMH of sCHF (EF 35%), VF/VT s/p BiV AICD (extraction and reimplantation 2/2018), CAD s/p CABG 2014, MV bioprosthetic repair now with severe restenosis, AF on Xarelto who presented to OSH with SOB, and was diagnosed with ADHF.  Pt has had frequent readmissions suspected to be 2/2 MV stenosis.  In addition, pt had fall prior to admission resulting in AICD lead crush injury; had small sternal hematoma for which Xarelto was held but has now been restarted.  Pt is now clinically euvolemic and being transitioned to PO Lasix.  Pt to be transferred to [facility] for possible AICD extraction by Dr. Fitzgerald and consideration of MVR by Dr. Ortiz; case d/w these consultants.     To do list upon patient arrival: admit to IM C or J if possible.  Consult EP Dr. Melanie Sol, consult CTS Dr. Ortiz, consider repeat ECHO; would hold Xarelto for possible upcoming procedures.      Accepting Hospital: [facility]     (If Going to [facility]) Please call extension [phone] upon patient arrival to floor for Hospital Medicine admit team assignment and for additional admit orders. If patient is coming from another [facility] facility please also call [phone] to inform the admit team/office that patient has arrived from the [facility] facility to the floor so patient can be evaluated.

## 2018-10-19 ENCOUNTER — DOCUMENTATION ONLY (OUTPATIENT)
Dept: ELECTROPHYSIOLOGY | Facility: CLINIC | Age: 63
End: 2018-10-19

## 2018-10-19 ENCOUNTER — HOSPITAL ENCOUNTER (INPATIENT)
Facility: HOSPITAL | Age: 63
LOS: 7 days | Discharge: HOME HEALTH | DRG: 314 | End: 2018-10-26
Attending: HOSPITALIST | Admitting: HOSPITALIST
Payer: MEDICARE

## 2018-10-19 DIAGNOSIS — Z95.1 HX OF CABG: ICD-10-CM

## 2018-10-19 DIAGNOSIS — Z79.01 CHRONIC ANTICOAGULATION: ICD-10-CM

## 2018-10-19 DIAGNOSIS — I10 BENIGN ESSENTIAL HTN: ICD-10-CM

## 2018-10-19 DIAGNOSIS — R41.82 ALTERED MENTAL STATUS: ICD-10-CM

## 2018-10-19 DIAGNOSIS — I05.0 SEVERE MITRAL VALVE STENOSIS: ICD-10-CM

## 2018-10-19 DIAGNOSIS — Z95.810 BIVENTRICULAR ICD (IMPLANTABLE CARDIOVERTER-DEFIBRILLATOR) IN PLACE: ICD-10-CM

## 2018-10-19 DIAGNOSIS — I48.91 ATRIAL FIBRILLATION, UNSPECIFIED TYPE: Primary | ICD-10-CM

## 2018-10-19 DIAGNOSIS — I50.21: ICD-10-CM

## 2018-10-19 DIAGNOSIS — I34.2 NON-RHEUMATIC MITRAL VALVE STENOSIS: ICD-10-CM

## 2018-10-19 DIAGNOSIS — I05.0 MITRAL VALVE STENOSIS: ICD-10-CM

## 2018-10-19 DIAGNOSIS — R07.9 CHEST PAIN: ICD-10-CM

## 2018-10-19 DIAGNOSIS — I50.23 ACUTE ON CHRONIC SYSTOLIC HEART FAILURE: Chronic | ICD-10-CM

## 2018-10-19 DIAGNOSIS — R00.0 TACHYCARDIA: ICD-10-CM

## 2018-10-19 DIAGNOSIS — D59.6 HEMOGLOBINURIA DUE TO HEMOLYSIS: ICD-10-CM

## 2018-10-19 DIAGNOSIS — I47.29 VENTRICULAR TACHYCARDIA, MONOMORPHIC: ICD-10-CM

## 2018-10-19 DIAGNOSIS — I38: ICD-10-CM

## 2018-10-19 DIAGNOSIS — E78.2 MIXED HYPERLIPIDEMIA: ICD-10-CM

## 2018-10-19 DIAGNOSIS — I63.9 CEREBROVASCULAR ACCIDENT (CVA), UNSPECIFIED MECHANISM: ICD-10-CM

## 2018-10-19 DIAGNOSIS — Z95.2 H/O MITRAL VALVE REPLACEMENT: ICD-10-CM

## 2018-10-19 DIAGNOSIS — I48.92 ATRIAL FLUTTER, UNSPECIFIED TYPE: ICD-10-CM

## 2018-10-19 DIAGNOSIS — T82.857A PROSTHETIC MITRAL VALVE STENOSIS: ICD-10-CM

## 2018-10-19 DIAGNOSIS — T82.120A DISPLACEMENT OF IMPLANTABLE CARDIOVERTER-DEFIBRILLATOR (ICD) LEAD: ICD-10-CM

## 2018-10-19 DIAGNOSIS — I48.92 ATRIAL FLUTTER: ICD-10-CM

## 2018-10-19 PROBLEM — I25.10 CORONARY ARTERY DISEASE INVOLVING NATIVE CORONARY ARTERY OF NATIVE HEART WITHOUT ANGINA PECTORIS: Status: ACTIVE | Noted: 2018-10-19

## 2018-10-19 LAB
ALBUMIN SERPL BCP-MCNC: 3.3 G/DL
ALP SERPL-CCNC: 133 U/L
ALT SERPL W/O P-5'-P-CCNC: 25 U/L
ANION GAP SERPL CALC-SCNC: 12 MMOL/L
APTT BLDCRRT: 27.1 SEC
AST SERPL-CCNC: 32 U/L
BASOPHILS # BLD AUTO: 0 K/UL
BASOPHILS NFR BLD: 0 %
BILIRUB SERPL-MCNC: 2 MG/DL
BNP SERPL-MCNC: 395 PG/ML
BUN SERPL-MCNC: 39 MG/DL
CALCIUM SERPL-MCNC: 10 MG/DL
CHLORIDE SERPL-SCNC: 98 MMOL/L
CO2 SERPL-SCNC: 27 MMOL/L
CREAT SERPL-MCNC: 1.1 MG/DL
DIFFERENTIAL METHOD: ABNORMAL
EOSINOPHIL # BLD AUTO: 0 K/UL
EOSINOPHIL NFR BLD: 0.2 %
ERYTHROCYTE [DISTWIDTH] IN BLOOD BY AUTOMATED COUNT: 17 %
EST. GFR  (AFRICAN AMERICAN): >60 ML/MIN/1.73 M^2
EST. GFR  (NON AFRICAN AMERICAN): >60 ML/MIN/1.73 M^2
ESTIMATED AVG GLUCOSE: 94 MG/DL
ESTIMATED PA SYSTOLIC PRESSURE: 51.72
GLUCOSE SERPL-MCNC: 86 MG/DL
HBA1C MFR BLD HPLC: 4.9 %
HCT VFR BLD AUTO: 35.4 %
HGB BLD-MCNC: 11.3 G/DL
IMM GRANULOCYTES # BLD AUTO: 0.02 K/UL
IMM GRANULOCYTES NFR BLD AUTO: 0.4 %
INR PPP: 1.4
LYMPHOCYTES # BLD AUTO: 0.5 K/UL
LYMPHOCYTES NFR BLD: 10.2 %
MAGNESIUM SERPL-MCNC: 1.1 MG/DL
MCH RBC QN AUTO: 31.6 PG
MCHC RBC AUTO-ENTMCNC: 31.9 G/DL
MCV RBC AUTO: 99 FL
MONOCYTES # BLD AUTO: 0.4 K/UL
MONOCYTES NFR BLD: 7.7 %
NEUTROPHILS # BLD AUTO: 3.9 K/UL
NEUTROPHILS NFR BLD: 81.5 %
NRBC BLD-RTO: 0 /100 WBC
PHOSPHATE SERPL-MCNC: 4.5 MG/DL
PLATELET # BLD AUTO: 177 K/UL
PMV BLD AUTO: 10.8 FL
POCT GLUCOSE: 94 MG/DL (ref 70–110)
POTASSIUM SERPL-SCNC: 4.2 MMOL/L
PROT SERPL-MCNC: 6.9 G/DL
PROTHROMBIN TIME: 13.7 SEC
RBC # BLD AUTO: 3.58 M/UL
RETIRED EF AND QEF - SEE NOTES: 23 (ref 55–65)
SODIUM SERPL-SCNC: 137 MMOL/L
T4 FREE SERPL-MCNC: 1.17 NG/DL
TRICUSPID VALVE REGURGITATION: ABNORMAL
TSH SERPL DL<=0.005 MIU/L-ACNC: 0.55 UIU/ML
WBC # BLD AUTO: 4.79 K/UL

## 2018-10-19 PROCEDURE — 93010 ELECTROCARDIOGRAM REPORT: CPT | Mod: 76,,, | Performed by: INTERNAL MEDICINE

## 2018-10-19 PROCEDURE — 25000242 PHARM REV CODE 250 ALT 637 W/ HCPCS: Performed by: NURSE PRACTITIONER

## 2018-10-19 PROCEDURE — 83880 ASSAY OF NATRIURETIC PEPTIDE: CPT

## 2018-10-19 PROCEDURE — 85610 PROTHROMBIN TIME: CPT

## 2018-10-19 PROCEDURE — 93010 ELECTROCARDIOGRAM REPORT: CPT | Mod: ,,, | Performed by: INTERNAL MEDICINE

## 2018-10-19 PROCEDURE — 80053 COMPREHEN METABOLIC PANEL: CPT

## 2018-10-19 PROCEDURE — 84443 ASSAY THYROID STIM HORMONE: CPT

## 2018-10-19 PROCEDURE — 83735 ASSAY OF MAGNESIUM: CPT

## 2018-10-19 PROCEDURE — 93306 TTE W/DOPPLER COMPLETE: CPT

## 2018-10-19 PROCEDURE — 25500020 PHARM REV CODE 255: Performed by: HOSPITALIST

## 2018-10-19 PROCEDURE — 99223 1ST HOSP IP/OBS HIGH 75: CPT | Mod: GC,,, | Performed by: INTERNAL MEDICINE

## 2018-10-19 PROCEDURE — 93306 TTE W/DOPPLER COMPLETE: CPT | Mod: 26,,, | Performed by: INTERNAL MEDICINE

## 2018-10-19 PROCEDURE — 84100 ASSAY OF PHOSPHORUS: CPT

## 2018-10-19 PROCEDURE — 99223 1ST HOSP IP/OBS HIGH 75: CPT | Mod: GC,,, | Performed by: PHYSICIAN ASSISTANT

## 2018-10-19 PROCEDURE — S4991 NICOTINE PATCH NONLEGEND: HCPCS | Performed by: NURSE PRACTITIONER

## 2018-10-19 PROCEDURE — 63600175 PHARM REV CODE 636 W HCPCS: Performed by: HOSPITALIST

## 2018-10-19 PROCEDURE — 85025 COMPLETE CBC W/AUTO DIFF WBC: CPT

## 2018-10-19 PROCEDURE — 83036 HEMOGLOBIN GLYCOSYLATED A1C: CPT

## 2018-10-19 PROCEDURE — 63600175 PHARM REV CODE 636 W HCPCS: Performed by: NURSE PRACTITIONER

## 2018-10-19 PROCEDURE — 99223 1ST HOSP IP/OBS HIGH 75: CPT | Mod: ,,, | Performed by: NURSE PRACTITIONER

## 2018-10-19 PROCEDURE — 36415 COLL VENOUS BLD VENIPUNCTURE: CPT

## 2018-10-19 PROCEDURE — 25000003 PHARM REV CODE 250: Performed by: NURSE PRACTITIONER

## 2018-10-19 PROCEDURE — 20600001 HC STEP DOWN PRIVATE ROOM

## 2018-10-19 PROCEDURE — 93005 ELECTROCARDIOGRAM TRACING: CPT

## 2018-10-19 PROCEDURE — 85730 THROMBOPLASTIN TIME PARTIAL: CPT

## 2018-10-19 PROCEDURE — 84439 ASSAY OF FREE THYROXINE: CPT

## 2018-10-19 RX ORDER — COLCHICINE 0.6 MG/1
0.6 TABLET, FILM COATED ORAL DAILY
Status: DISCONTINUED | OUTPATIENT
Start: 2018-10-19 | End: 2018-10-19

## 2018-10-19 RX ORDER — FUROSEMIDE 10 MG/ML
80 INJECTION INTRAMUSCULAR; INTRAVENOUS 2 TIMES DAILY
Status: DISCONTINUED | OUTPATIENT
Start: 2018-10-19 | End: 2018-10-20

## 2018-10-19 RX ORDER — ALBUTEROL SULFATE 90 UG/1
2 AEROSOL, METERED RESPIRATORY (INHALATION) EVERY 6 HOURS PRN
Status: DISCONTINUED | OUTPATIENT
Start: 2018-10-19 | End: 2018-10-26 | Stop reason: HOSPADM

## 2018-10-19 RX ORDER — GLUCAGON 1 MG
1 KIT INJECTION
Status: DISCONTINUED | OUTPATIENT
Start: 2018-10-19 | End: 2018-10-26 | Stop reason: HOSPADM

## 2018-10-19 RX ORDER — HEPARIN SODIUM,PORCINE/D5W 25000/250
12 INTRAVENOUS SOLUTION INTRAVENOUS CONTINUOUS
Status: DISPENSED | OUTPATIENT
Start: 2018-10-19 | End: 2018-10-23

## 2018-10-19 RX ORDER — COLCHICINE 0.6 MG/1
1.2 TABLET, FILM COATED ORAL DAILY
Status: DISCONTINUED | OUTPATIENT
Start: 2018-10-19 | End: 2018-10-19

## 2018-10-19 RX ORDER — ISOSORBIDE MONONITRATE 30 MG/1
30 TABLET, EXTENDED RELEASE ORAL DAILY
Status: DISCONTINUED | OUTPATIENT
Start: 2018-10-19 | End: 2018-10-20

## 2018-10-19 RX ORDER — COLCHICINE 0.6 MG/1
1.2 TABLET, FILM COATED ORAL ONCE
Status: DISCONTINUED | OUTPATIENT
Start: 2018-10-19 | End: 2018-10-19

## 2018-10-19 RX ORDER — MAGNESIUM GLUCONATE 27 MG(500)
54 TABLET ORAL 3 TIMES DAILY
Status: DISCONTINUED | OUTPATIENT
Start: 2018-10-19 | End: 2018-10-19

## 2018-10-19 RX ORDER — ATORVASTATIN CALCIUM 20 MG/1
40 TABLET, FILM COATED ORAL NIGHTLY
Status: DISCONTINUED | OUTPATIENT
Start: 2018-10-19 | End: 2018-10-26 | Stop reason: HOSPADM

## 2018-10-19 RX ORDER — ONDANSETRON HYDROCHLORIDE 8 MG/1
8 TABLET, FILM COATED ORAL 2 TIMES DAILY PRN
Status: ON HOLD | COMMUNITY
End: 2018-10-19

## 2018-10-19 RX ORDER — LANOLIN ALCOHOL/MO/W.PET/CERES
400 CREAM (GRAM) TOPICAL DAILY
Status: DISCONTINUED | OUTPATIENT
Start: 2018-10-20 | End: 2018-10-19

## 2018-10-19 RX ORDER — IBUPROFEN 200 MG
16 TABLET ORAL
Status: DISCONTINUED | OUTPATIENT
Start: 2018-10-19 | End: 2018-10-26 | Stop reason: HOSPADM

## 2018-10-19 RX ORDER — AMIODARONE HYDROCHLORIDE 200 MG/1
200 TABLET ORAL DAILY
Status: DISCONTINUED | OUTPATIENT
Start: 2018-10-19 | End: 2018-10-19

## 2018-10-19 RX ORDER — POTASSIUM CHLORIDE 20 MEQ/1
20 TABLET, EXTENDED RELEASE ORAL DAILY
Status: DISCONTINUED | OUTPATIENT
Start: 2018-10-19 | End: 2018-10-23

## 2018-10-19 RX ORDER — RANOLAZINE 500 MG/1
500 TABLET, EXTENDED RELEASE ORAL 2 TIMES DAILY
Status: DISCONTINUED | OUTPATIENT
Start: 2018-10-19 | End: 2018-10-26 | Stop reason: HOSPADM

## 2018-10-19 RX ORDER — HYDRALAZINE HYDROCHLORIDE 25 MG/1
25 TABLET, FILM COATED ORAL 3 TIMES DAILY
Status: DISCONTINUED | OUTPATIENT
Start: 2018-10-19 | End: 2018-10-20

## 2018-10-19 RX ORDER — MAGNESIUM SULFATE HEPTAHYDRATE 40 MG/ML
2 INJECTION, SOLUTION INTRAVENOUS
Status: COMPLETED | OUTPATIENT
Start: 2018-10-19 | End: 2018-10-19

## 2018-10-19 RX ORDER — METOLAZONE 5 MG/1
5 TABLET ORAL
Status: ON HOLD | COMMUNITY
End: 2018-10-26 | Stop reason: SDUPTHER

## 2018-10-19 RX ORDER — ACETAMINOPHEN 325 MG/1
650 TABLET ORAL EVERY 4 HOURS PRN
Status: DISCONTINUED | OUTPATIENT
Start: 2018-10-19 | End: 2018-10-26 | Stop reason: HOSPADM

## 2018-10-19 RX ORDER — SODIUM CHLORIDE 0.9 % (FLUSH) 0.9 %
5 SYRINGE (ML) INJECTION
Status: DISCONTINUED | OUTPATIENT
Start: 2018-10-19 | End: 2018-10-26 | Stop reason: HOSPADM

## 2018-10-19 RX ORDER — ONDANSETRON 8 MG/1
8 TABLET, ORALLY DISINTEGRATING ORAL EVERY 8 HOURS PRN
Status: DISCONTINUED | OUTPATIENT
Start: 2018-10-19 | End: 2018-10-26 | Stop reason: HOSPADM

## 2018-10-19 RX ORDER — LANOLIN ALCOHOL/MO/W.PET/CERES
400 CREAM (GRAM) TOPICAL DAILY
COMMUNITY

## 2018-10-19 RX ORDER — AMOXICILLIN 250 MG
1 CAPSULE ORAL 2 TIMES DAILY
Status: DISCONTINUED | OUTPATIENT
Start: 2018-10-19 | End: 2018-10-26 | Stop reason: HOSPADM

## 2018-10-19 RX ORDER — HEPARIN SODIUM 5000 [USP'U]/ML
5000 INJECTION, SOLUTION INTRAVENOUS; SUBCUTANEOUS EVERY 12 HOURS
Status: DISCONTINUED | OUTPATIENT
Start: 2018-10-19 | End: 2018-10-19

## 2018-10-19 RX ORDER — IBUPROFEN 200 MG
1 TABLET ORAL DAILY
Status: DISCONTINUED | OUTPATIENT
Start: 2018-10-19 | End: 2018-10-26 | Stop reason: HOSPADM

## 2018-10-19 RX ORDER — BUDESONIDE AND FORMOTEROL FUMARATE DIHYDRATE 160; 4.5 UG/1; UG/1
2 AEROSOL RESPIRATORY (INHALATION) EVERY 12 HOURS
COMMUNITY

## 2018-10-19 RX ORDER — FLUTICASONE FUROATE AND VILANTEROL 100; 25 UG/1; UG/1
1 POWDER RESPIRATORY (INHALATION) DAILY
Status: DISCONTINUED | OUTPATIENT
Start: 2018-10-19 | End: 2018-10-26 | Stop reason: HOSPADM

## 2018-10-19 RX ORDER — IBUPROFEN 200 MG
24 TABLET ORAL
Status: DISCONTINUED | OUTPATIENT
Start: 2018-10-19 | End: 2018-10-26 | Stop reason: HOSPADM

## 2018-10-19 RX ORDER — NITROGLYCERIN 0.4 MG/1
0.4 TABLET SUBLINGUAL EVERY 5 MIN PRN
Status: DISCONTINUED | OUTPATIENT
Start: 2018-10-19 | End: 2018-10-26 | Stop reason: HOSPADM

## 2018-10-19 RX ADMIN — RANOLAZINE 500 MG: 500 TABLET, FILM COATED, EXTENDED RELEASE ORAL at 10:10

## 2018-10-19 RX ADMIN — HYDRALAZINE HYDROCHLORIDE 25 MG: 25 TABLET ORAL at 02:10

## 2018-10-19 RX ADMIN — METOPROLOL SUCCINATE 12.5 MG: 25 TABLET, EXTENDED RELEASE ORAL at 02:10

## 2018-10-19 RX ADMIN — FLUTICASONE FUROATE AND VILANTEROL TRIFENATATE 1 PUFF: 100; 25 POWDER RESPIRATORY (INHALATION) at 02:10

## 2018-10-19 RX ADMIN — ISOSORBIDE MONONITRATE 30 MG: 30 TABLET, EXTENDED RELEASE ORAL at 02:10

## 2018-10-19 RX ADMIN — ATORVASTATIN CALCIUM 40 MG: 20 TABLET, FILM COATED ORAL at 10:10

## 2018-10-19 RX ADMIN — NICOTINE 1 PATCH: 21 PATCH, EXTENDED RELEASE TRANSDERMAL at 02:10

## 2018-10-19 RX ADMIN — MAGNESIUM SULFATE IN WATER 2 G: 40 INJECTION, SOLUTION INTRAVENOUS at 05:10

## 2018-10-19 RX ADMIN — SACUBITRIL AND VALSARTAN 1 TABLET: 49; 51 TABLET, FILM COATED ORAL at 10:10

## 2018-10-19 RX ADMIN — SENNOSIDES AND DOCUSATE SODIUM 1 TABLET: 8.6; 5 TABLET ORAL at 10:10

## 2018-10-19 RX ADMIN — MAGNESIUM SULFATE IN WATER 2 G: 40 INJECTION, SOLUTION INTRAVENOUS at 09:10

## 2018-10-19 RX ADMIN — IOHEXOL 100 ML: 350 INJECTION, SOLUTION INTRAVENOUS at 06:10

## 2018-10-19 RX ADMIN — HYDRALAZINE HYDROCHLORIDE 25 MG: 25 TABLET ORAL at 10:10

## 2018-10-19 RX ADMIN — HEPARIN SODIUM AND DEXTROSE 12 UNITS/KG/HR: 10000; 5 INJECTION INTRAVENOUS at 10:10

## 2018-10-19 RX ADMIN — POTASSIUM CHLORIDE 20 MEQ: 1500 TABLET, EXTENDED RELEASE ORAL at 02:10

## 2018-10-19 RX ADMIN — HEPARIN SODIUM 5000 UNITS: 5000 INJECTION, SOLUTION INTRAVENOUS; SUBCUTANEOUS at 04:10

## 2018-10-19 RX ADMIN — FUROSEMIDE 80 MG: 10 INJECTION, SOLUTION INTRAMUSCULAR; INTRAVENOUS at 04:10

## 2018-10-19 NOTE — HPI
Pt is a 63 year old gentleman who we are consulted for possible ICD crush injury    He has a hx of CAD s/p CABG in 2014 with MV repair, ICM EF 25% with BiV ICD, VT/VF who presented to outside hospital with a fall. Apparently fell on his ICD so he was sent here for extraction by Dr. Fitzgerald and with possible restenosis of MV he was sent for possible MVR by Dr. Ortiz. He is feeling tired at this time. Today on interrogation of his device he was noted to have no changes in thresholds or impedence and was noted to be in atrial flutter. He has apparently not filled his prescriptions in months.

## 2018-10-19 NOTE — ASSESSMENT & PLAN NOTE
- , physical exam findings c/w acute heart failure exacerbation, prior EF 35%, echo pending, T bili elevated 2.0  - start Iv lasix 80mg bid, as he is having tachypnea and elevated JVD/ HJR  - monitor weights daily  - monitor strict I/o's  - telemetry   - resume entresto (last fill several months ago), hydralazine, imdur (until we can watch CR and possibly increase entresto), Toprol,   - noncompliant with meds  - + ETOH/ smoker abuse / Lives alone/ eats out / cooks with salt educated on all accounts

## 2018-10-19 NOTE — HPI
Mr. Mckeon is a 63 year old male with past medical history of HFrEF (EF 35%), VF/VT s/p BiV AICD (extraction and reimplantation 2/2018 with some lead retention related to lead position under clavicle), CAD s/p CABG 2014, MV bioprosthetic repair in 2014 now with severe restenosis, AFib on Xarelto, tobacco use, and ~ daily ETOH use with most recent who presented to [facility] with SOB and chest pain x 3 days.  He was diagnosed with ADHF thought secondary to mitral bioprosthetic valve restenosis. Prior to admission at  he had a fall due to ETOH intoxication that resulted in chest trauma with subsequent small hematoma; device was interrogated finding normal impedance, During interrogation noted tachy episode that within VF zone 10/19 that was ATP'd with resolution and was in 2:1 Aflutter with rates ~100; OAC with xarelto was continued. He also had a recent prior admission at OSH for cough, body aches, productive cough, with negative flu swab. Due to multiple recent CHF admission thought secondary to mitral valve restenosis he was transferred to [facility] for evaluation by EP and CTS. He reports occasional dietary non compliance and has been out of home medications for ~a month and amiodarone for ~ 5 months. All past medical, social, and family history reviewed.     At admission CBC and chemistry stable, troponin 0.203 within historical baseline, , TSH 0.550, and HgA1c 4.9/94. The patient was admitted to the Hospital Medicine Service for further evaluation and management.

## 2018-10-19 NOTE — PROGRESS NOTES
Cardiac device interrogation and/or reprogramming completed by industry representative, Jeanine Florez; please refer to report located in the media tab.     As per her note, BiV ICD  Pt RV paces 41%  LV paces 97%  2.90 V on battery.  AF burden 23.8%, burden may be an under calculation as there is undersensing of AFL  Currently in AFL, some RVR noted.  Event on 10/9/18 in VF zone receives ATP x1 egram c/w AFL and RVR vs dual tachycardia.  ATP does slow down ventricular rate.  Changes made, Adjusted mode switch rate from 160bpm--.120bpm.  Russell did show findings to Dr. Gómez.

## 2018-10-19 NOTE — CONSULTS
[facility]  Cardiac Electrophysiology  Consult Note    Admission Date: 10/19/2018  Code Status: Full Code   Attending Provider: Justin Robles MD  Consulting Provider: Sean Gómez MD  Principal Problem:Acute on chronic systolic heart failure    Inpatient consult to Electrophysiology  Consult performed by: Sean Gómez MD  Consult ordered by: Chaya Flores NP  Reason for consult: AF        Subjective:     Chief Complaint:  AF    HPI:   Pt is a 63 year old gentleman who we are consulted for possible ICD crush injury    He has a hx of CAD s/p CABG in 2014 with MV repair, ICM EF 25% with BiV ICD, VT/VF who presented to outside hospital with a fall. Apparently fell on his ICD so he was sent here for extraction by Dr. Fitzgerald and with possible restenosis of MV he was sent for possible MVR by Dr. Ortiz. He is feeling tired at this time. Today on interrogation of his device he was noted to have no changes in thresholds or impedence and was noted to be in atrial flutter. He has apparently not filled his prescriptions in months.     History reviewed. No pertinent past medical history.    Past Surgical History:   Procedure Laterality Date    EXTRACTION-LEAD N/A 2/12/2018    Performed by Jose Fitzgerald MD at [facility] OR 2ND FLR    EXTRACTION-LEAD N/A 2/12/2018    Performed by Jose Fitzgerald MD at [facility] CATH LAB    INSERTION-ICD-BIVENTRICULAR N/A 2/12/2018    Performed by Jose Fitzgerald MD at [facility] CATH LAB       Review of patient's allergies indicates:  No Known Allergies    No current facility-administered medications on file prior to encounter.      Current Outpatient Medications on File Prior to Encounter   Medication Sig    albuterol (PROAIR HFA) 90 mcg/actuation inhaler Inhale 2 puffs into the lungs every 6 (six) hours as needed for Wheezing. Rescue    atorvastatin (LIPITOR) 40 MG tablet Take 40 mg by mouth once daily.    budesonide-formoterol 160-4.5 mcg  (SYMBICORT) 160-4.5 mcg/actuation HFAA Inhale 2 puffs into the lungs every 12 (twelve) hours. Controller    furosemide (LASIX) 40 MG tablet Take 1 tablet (40 mg total) by mouth 2 (two) times daily. (Patient taking differently: Take 80 mg by mouth 2 (two) times daily. )    hydrALAZINE (APRESOLINE) 25 MG tablet Take 1 tablet (25 mg total) by mouth 3 (three) times daily. Do not take this medicine until you see your PCP in 1 week    isosorbide mononitrate (IMDUR) 30 MG 24 hr tablet Take 1 tablet (30 mg total) by mouth once daily.    magnesium oxide (MAG-OX) 400 mg (241.3 mg magnesium) tablet Take 400 mg by mouth once daily.    metOLazone (ZAROXOLYN) 5 MG tablet Take 5 mg by mouth as needed. Twice weekly as needed    metoprolol succinate (TOPROL-XL) 25 MG 24 hr tablet Take 12.5 mg by mouth once daily.    potassium chloride (KLOR-CON) 10 MEQ TbSR Take 20 mEq by mouth 3 (three) times daily. 2  Caps tid    ranolazine (RANEXA) 500 MG Tb12 Take 500 mg by mouth 2 (two) times daily.    rivaroxaban (XARELTO) 20 mg Tab Take 1 tablet (20 mg total) by mouth once daily. Do not take this medication for 5 days after procedure (restart on 2/18/2018) (Patient taking differently: Take 20 mg by mouth once daily. )    sacubitril-valsartan (ENTRESTO) 49-51 mg per tablet Take 1 tablet by mouth 2 (two) times daily.    [DISCONTINUED] ondansetron (ZOFRAN) 8 MG tablet Take 8 mg by mouth 2 (two) times daily as needed for Nausea.    acetaminophen (TYLENOL) 325 MG tablet Take 2 tablets (650 mg total) by mouth every 4 (four) hours as needed.    amiodarone (PACERONE) 200 MG Tab Take 1 tablet (200 mg total) by mouth 2 (two) times daily. Take 200mg twice a day until seen by Dr. Fitzgerald in clinic (Patient taking differently: Take 200 mg by mouth once daily. Take 200mg a day at home not listed in transfer med list, though he said he took it yesterday)    nitroGLYCERIN (NITROSTAT) 0.4 MG SL tablet Place 0.4 mg under the tongue every 5  (five) minutes as needed for Chest pain.    [DISCONTINUED] oxyCODONE-acetaminophen (PERCOCET)  mg per tablet Take 1 tablet by mouth every 12 (twelve) hours as needed for Pain.     Family History     None        Tobacco Use    Smoking status: Current Every Day Smoker     Packs/day: 0.25     Years: 50.00     Pack years: 12.50    Smokeless tobacco: Current User     Types: Snuff   Substance and Sexual Activity    Alcohol use: No    Drug use: No    Sexual activity: Not on file     Review of Systems   All other systems reviewed and are negative.    Objective:     Vital Signs (Most Recent):  Temp: 99.1 °F (37.3 °C) (10/19/18 1534)  Pulse: 68 (10/19/18 1534)  Resp: 18 (10/19/18 1534)  BP: 131/83 (10/19/18 1534)  SpO2: (!) 93 % (10/19/18 1534) Vital Signs (24h Range):  Temp:  [97.7 °F (36.5 °C)-99.1 °F (37.3 °C)] 99.1 °F (37.3 °C)  Pulse:  [68-99] 68  Resp:  [18] 18  SpO2:  [93 %-94 %] 93 %  BP: (113-131)/(77-86) 131/83       Weight: 109.6 kg (241 lb 10 oz)  Body mass index is 34.67 kg/m².    SpO2: (!) 93 %  O2 Device (Oxygen Therapy): room air    Physical Exam  GEN: Alert and oriented in NAD  NECK: no JVD appreciated   CVS: RRR, s1/s2, no MRG  PULM: CTAB no rales  ABD: NT/ND BS +  Extremities: warm and dry, palpable pulses, no edema  NEURO: Alert and oriented x 3  PSYCH: appropriate affect.         Significant Labs: All pertinent lab results from the last 24 hours have been reviewed.    Significant Imaging: reviewed              Assessment and Plan:     Atrial flutter    Pt here with possible severe mitral stenosis and possible MVR. Today on interrogation of device noted to be in atrial flutter. He has apparently not taken his medications in months. Would recommend starting on heparin gtt as this would be valvular atrial fibrillation. Would recommend NITA/DCCV on Monday if he remains in flutter.          Thank you for your consult. I will follow-up with patient. Please contact us if you have any additional  questions.    Sean Gómez MD  Cardiac Electrophysiology  [facility]

## 2018-10-19 NOTE — ASSESSMENT & PLAN NOTE
- h/o CABG 2014 with bioprosthetic valve, no angina, recent cp d/t trauma from fall during black out episode while drunk.

## 2018-10-19 NOTE — H&P
[facility] Hospital Medicine  History & Physical    Patient Name: Quentin Mckeon  MRN: [id number]  Admission Date: 10/19/2018  Attending Physician: Justin Robles MD   Primary Care Provider: Primary Doctor [facility] Medicine Team: [facility] J Chaya Flores NP     Patient information was obtained from spouse/SO and past medical records.     Subjective:     Principal Problem:Acute on chronic systolic heart failure    Chief Complaint:   Chief Complaint   Patient presents with    Shortness of Breath        HPI: 62 y/o AAM with PMH of sCHF (EF 35%), VF/VT s/p BiV AICD (extraction and reimplantation 2/2018 with some lead retention d/t inability to remove as it's stuck under clavicle), CAD s/p CABG 2014, MV bioprosthetic repair now with severe restenosis 2014, AF on Xarelto, + daily smoker, + etoh abuse (most recent drink about a week ago, passed out and hit chest on a kitchen cabinet, has not drank since, no h/o DT's) who presented to [facility] with SOB, chest pain x 3 days.  He was diagnosed with ADHF  secondary to mitral bioprosthetic restenosis. He had a prior admission for cough, body aches, productive cough, 10/12 flu swab negative so abx were stopped.  He admits to eating out, not watching his salt, per home pharmacy records he has not filled is amiodarone since 5/15/18.  He also appears to have been out of a most of his medications for at least a month or more.  Last fills are in home med rec tab.  Pt has had frequent readmissions suspected to be 2/2 MV stenosis.  In addition, pt had fall prior to admission which has not affected his AICD lead per interrogation, impedance normal.  There was a concern that he may have had a lead crush injury as he had a small sternal hematoma after passing out during a etoh induced binge and fell on kitchen cabinet.  His Xarelto was held initially but was restarted OSH. Interrogation also revealed what appears to be 2:1 flutter at rate of  100.  He had one episode on 10/19 that fell into the VF zone for which he was ATP'ed out of in one round.  [place] [facility] [place] possible AICD extraction by Dr. Fitzgerald and consideration of MVR by Dr. Ortiz; case d/w these consultants per Dr. Jennifer Johnson note.           History reviewed. No pertinent past medical history.    Past Surgical History:   Procedure Laterality Date    EXTRACTION-LEAD N/A 2/12/2018    Performed by Jose Fitzgerald MD at [facility] OR 2ND FLR    EXTRACTION-LEAD N/A 2/12/2018    Performed by Jose Fitzgerald MD at [facility] CATH LAB    INSERTION-ICD-BIVENTRICULAR N/A 2/12/2018    Performed by Jose Fitzgerald MD at [facility] CATH LAB       Review of patient's allergies indicates:  No Known Allergies    No current facility-administered medications on file prior to encounter.      Current Outpatient Medications on File Prior to Encounter   Medication Sig    albuterol (PROAIR HFA) 90 mcg/actuation inhaler Inhale 2 puffs into the lungs every 6 (six) hours as needed for Wheezing. Rescue    atorvastatin (LIPITOR) 40 MG tablet Take 40 mg by mouth once daily.    budesonide-formoterol 160-4.5 mcg (SYMBICORT) 160-4.5 mcg/actuation HFAA Inhale 2 puffs into the lungs every 12 (twelve) hours. Controller    furosemide (LASIX) 40 MG tablet Take 1 tablet (40 mg total) by mouth 2 (two) times daily. (Patient taking differently: Take 80 mg by mouth 2 (two) times daily. )    hydrALAZINE (APRESOLINE) 25 MG tablet Take 1 tablet (25 mg total) by mouth 3 (three) times daily. Do not take this medicine until you see your PCP in 1 week    isosorbide mononitrate (IMDUR) 30 MG 24 hr tablet Take 1 tablet (30 mg total) by mouth once daily.    magnesium oxide (MAG-OX) 400 mg (241.3 mg magnesium) tablet Take 400 mg by mouth once daily.    metOLazone (ZAROXOLYN) 5 MG tablet Take 5 mg by mouth as needed. Twice weekly as needed    metoprolol succinate (TOPROL-XL) 25 MG 24 hr tablet Take 12.5 mg by mouth once  daily.    potassium chloride (KLOR-CON) 10 MEQ TbSR Take 20 mEq by mouth 3 (three) times daily. 2  Caps tid    ranolazine (RANEXA) 500 MG Tb12 Take 500 mg by mouth 2 (two) times daily.    rivaroxaban (XARELTO) 20 mg Tab Take 1 tablet (20 mg total) by mouth once daily. Do not take this medication for 5 days after procedure (restart on 2/18/2018) (Patient taking differently: Take 20 mg by mouth once daily. )    sacubitril-valsartan (ENTRESTO) 49-51 mg per tablet Take 1 tablet by mouth 2 (two) times daily.    [DISCONTINUED] ondansetron (ZOFRAN) 8 MG tablet Take 8 mg by mouth 2 (two) times daily as needed for Nausea.    acetaminophen (TYLENOL) 325 MG tablet Take 2 tablets (650 mg total) by mouth every 4 (four) hours as needed.    amiodarone (PACERONE) 200 MG Tab Take 1 tablet (200 mg total) by mouth 2 (two) times daily. Take 200mg twice a day until seen by Dr. Fitzgerald in clinic (Patient taking differently: Take 200 mg by mouth once daily. Take 200mg a day at home not listed in transfer med list, though he said he took it yesterday)    nitroGLYCERIN (NITROSTAT) 0.4 MG SL tablet Place 0.4 mg under the tongue every 5 (five) minutes as needed for Chest pain.    [DISCONTINUED] oxyCODONE-acetaminophen (PERCOCET)  mg per tablet Take 1 tablet by mouth every 12 (twelve) hours as needed for Pain.     Family History     None        Tobacco Use    Smoking status: Current Every Day Smoker     Packs/day: 0.25     Years: 50.00     Pack years: 12.50    Smokeless tobacco: Current User     Types: Snuff   Substance and Sexual Activity    Alcohol use: No    Drug use: No    Sexual activity: Not on file     Review of Systems   Constitutional: Positive for activity change and fatigue. Negative for appetite change, chills and fever.   HENT: Negative for congestion, rhinorrhea, sinus pressure, sore throat and trouble swallowing.    Eyes: Negative for pain, redness and visual disturbance.   Respiratory: Positive for cough,  chest tightness, shortness of breath and wheezing. Negative for stridor.    Cardiovascular: Positive for chest pain (patient also recent fall onto sternum d/t black episode while drinking). Negative for palpitations and leg swelling.   Gastrointestinal: Positive for abdominal distention and abdominal pain. Negative for blood in stool, constipation, diarrhea, nausea and vomiting.   Endocrine: Negative for cold intolerance and heat intolerance.   Genitourinary: Negative for dysuria, frequency, hematuria and urgency.   Musculoskeletal: Positive for arthralgias, back pain and gait problem. Negative for myalgias and neck pain.        Uses rolling walker to mobilize around     Skin: Negative for color change, pallor and rash.        Sternal notch hematoma    Allergic/Immunologic: Negative for immunocompromised state.   Neurological: Positive for weakness. Negative for dizziness, tremors, syncope, light-headedness, numbness and headaches.   Hematological: Does not bruise/bleed easily.   Psychiatric/Behavioral: Negative for agitation and confusion. The patient is not nervous/anxious.      Objective:     Vital Signs (Most Recent):  Temp: 99.1 °F (37.3 °C) (10/19/18 1534)  Pulse: 68 (10/19/18 1534)  Resp: 18 (10/19/18 1534)  BP: 131/83 (10/19/18 1534)  SpO2: (!) 93 % (10/19/18 1534) Vital Signs (24h Range):  Temp:  [97.7 °F (36.5 °C)-99.1 °F (37.3 °C)] 99.1 °F (37.3 °C)  Pulse:  [68-99] 68  Resp:  [18] 18  SpO2:  [93 %-94 %] 93 %  BP: (113-131)/(77-86) 131/83     Weight: 109.6 kg (241 lb 10 oz)  Body mass index is 34.67 kg/m².    Physical Exam   Constitutional: He is oriented to person, place, and time. He appears well-developed and well-nourished. No distress.   HENT:   Head: Normocephalic and atraumatic.   Right Ear: External ear normal.   Left Ear: External ear normal.   Nose: Nose normal.   Mouth/Throat: Oropharynx is clear and moist.   Eyes: Conjunctivae and EOM are normal. No scleral icterus.   Neck: Normal range of  motion. Neck supple. Hepatojugular reflux and JVD present. No tracheal deviation present. No thyromegaly present.   Cardiovascular: Normal rate, regular rhythm and intact distal pulses. Exam reveals no gallop and no friction rub.   Murmur (high pitched blowing III/VI mitral area) heard.  Pulmonary/Chest: Effort normal and breath sounds normal. No respiratory distress. He has no wheezes. He has no rales.   Abdominal: Soft. Bowel sounds are normal. He exhibits distension. He exhibits no mass. There is no tenderness. There is no rebound and no guarding.   Musculoskeletal: Normal range of motion. He exhibits edema (slight ble). He exhibits no tenderness.   Neurological: He is alert and oriented to person, place, and time. No cranial nerve deficit or sensory deficit. He exhibits normal muscle tone. Coordination abnormal.   Skin: Skin is warm and dry. No rash noted. No erythema.   Psychiatric: He has a normal mood and affect. His behavior is normal. Judgment and thought content normal.   Nursing note and vitals reviewed.        CRANIAL NERVES     CN III, IV, VI   Extraocular motions are normal.        Significant Labs:   A1C:   Recent Labs   Lab 10/19/18  1358   HGBA1C 4.9     CBC: No results for input(s): WBC, HGB, HCT, PLT in the last 48 hours.  CMP:   Recent Labs   Lab 10/19/18  1358      K 4.2   CL 98   CO2 27   GLU 86   BUN 39*   CREATININE 1.1   CALCIUM 10.0   PROT 6.9   ALBUMIN 3.3*   BILITOT 2.0*   ALKPHOS 133   AST 32   ALT 25   ANIONGAP 12   EGFRNONAA >60.0     Cardiac Markers:   Recent Labs   Lab 10/19/18  1358   *     TSH:   Recent Labs   Lab 10/19/18  1358   TSH 0.550       Significant Imaging: Echo: I have reviewed all pertinent results/findings within the past 24 hours and my personal findings are:  pending  EKG: I have reviewed all pertinent results/findings within the past 24 hours and my personal findings are: V paced 97 bpm     Interrogation:   Impedence fine, no lead issues  ? 2:1 flutter,  rate 100, afib burden reported but not in afib   10/9/18 One episode that fell into VF zone, converted with one round of ATP so not VF.     Assessment/Plan:     * Acute on chronic systolic heart failure    - , physical exam findings c/w acute heart failure exacerbation, prior EF 35%, echo pending, T bili elevated 2.0  - start Iv lasix 80mg bid, as he is having tachypnea and elevated JVD/ HJR  - monitor weights daily  - monitor strict I/o's  - telemetry   - resume entresto (last fill several months ago), hydralazine, imdur (until we can watch CR and possibly increase entresto), Toprol,   - noncompliant with meds  - + ETOH/ smoker abuse / Lives alone/ eats out / cooks with salt educated on all accounts       Prosthetic mitral valve stenosis bioprosthetic 2014    - Referred to CT to evaluate restenosis of bioprosthetic valve  - CT surgery consulted   - holding xarelto for potential procedures       Biventricular ICD (implantable cardioverter-defibrillator) in place    - h/o BIV ICD extract implant 2/18, case complicated by lead retention  - EP consulted, h/o VT/VF, no amiodarone filled since May 2018       AICD lead malfunction    - no lead malfunction, interrogation impedance is fine, no lead issues       Ventricular tachycardia, monomorphic    - h/o one episode that landed in VF zone, terminated after one round of ATP  - Discuss with EP about restarting amiodarone and or need to get NITA first as he has also possible 2:1 flutter and risk of chemical cardioversion        Hx of CABG 2014    See above        Coronary artery disease involving native coronary artery of native heart without angina pectoris    - h/o CABG 2014 with bioprosthetic valve, no angina, recent cp d/t trauma from fall during black out episode while drunk.       Essential hypertension    - resume entresto, BB, imdur, hydralazine        Chronic anticoagulation    - xarelto on hold for possible upcoming surgical procedures  - consider heparin gtt  if rhythm warrants        HLD (hyperlipidemia)    atorva 80  - check lipid panel in am         VTE Risk Mitigation (From admission, onward)        Ordered     heparin (porcine) injection 5,000 Units  Every 12 hours      10/19/18 1622             Chaya Flores NP  Department of Hospital Medicine   [facility]  Spectra:  [phone]  Pager: [phone]

## 2018-10-19 NOTE — SUBJECTIVE & OBJECTIVE
History reviewed. No pertinent past medical history.    Past Surgical History:   Procedure Laterality Date    EXTRACTION-LEAD N/A 2/12/2018    Performed by Jose Fitzgerald MD at [facility] OR 2ND FLR    EXTRACTION-LEAD N/A 2/12/2018    Performed by Jose Fitzgerald MD at [facility] CATH LAB    INSERTION-ICD-BIVENTRICULAR N/A 2/12/2018    Performed by Jose Fitzgerald MD at [facility] CATH LAB       Review of patient's allergies indicates:  No Known Allergies    No current facility-administered medications on file prior to encounter.      Current Outpatient Medications on File Prior to Encounter   Medication Sig    albuterol (PROAIR HFA) 90 mcg/actuation inhaler Inhale 2 puffs into the lungs every 6 (six) hours as needed for Wheezing. Rescue    atorvastatin (LIPITOR) 40 MG tablet Take 40 mg by mouth once daily.    budesonide-formoterol 160-4.5 mcg (SYMBICORT) 160-4.5 mcg/actuation HFAA Inhale 2 puffs into the lungs every 12 (twelve) hours. Controller    furosemide (LASIX) 40 MG tablet Take 1 tablet (40 mg total) by mouth 2 (two) times daily. (Patient taking differently: Take 80 mg by mouth 2 (two) times daily. )    hydrALAZINE (APRESOLINE) 25 MG tablet Take 1 tablet (25 mg total) by mouth 3 (three) times daily. Do not take this medicine until you see your PCP in 1 week    isosorbide mononitrate (IMDUR) 30 MG 24 hr tablet Take 1 tablet (30 mg total) by mouth once daily.    magnesium oxide (MAG-OX) 400 mg (241.3 mg magnesium) tablet Take 400 mg by mouth once daily.    metOLazone (ZAROXOLYN) 5 MG tablet Take 5 mg by mouth as needed. Twice weekly as needed    metoprolol succinate (TOPROL-XL) 25 MG 24 hr tablet Take 12.5 mg by mouth once daily.    potassium chloride (KLOR-CON) 10 MEQ TbSR Take 20 mEq by mouth 3 (three) times daily. 2  Caps tid    ranolazine (RANEXA) 500 MG Tb12 Take 500 mg by mouth 2 (two) times daily.    rivaroxaban (XARELTO) 20 mg Tab Take 1 tablet (20 mg total) by mouth once daily. Do not  take this medication for 5 days after procedure (restart on 2/18/2018) (Patient taking differently: Take 20 mg by mouth once daily. )    sacubitril-valsartan (ENTRESTO) 49-51 mg per tablet Take 1 tablet by mouth 2 (two) times daily.    [DISCONTINUED] ondansetron (ZOFRAN) 8 MG tablet Take 8 mg by mouth 2 (two) times daily as needed for Nausea.    acetaminophen (TYLENOL) 325 MG tablet Take 2 tablets (650 mg total) by mouth every 4 (four) hours as needed.    amiodarone (PACERONE) 200 MG Tab Take 1 tablet (200 mg total) by mouth 2 (two) times daily. Take 200mg twice a day until seen by Dr. Fitzgerald in clinic (Patient taking differently: Take 200 mg by mouth once daily. Take 200mg a day at home not listed in transfer med list, though he said he took it yesterday)    nitroGLYCERIN (NITROSTAT) 0.4 MG SL tablet Place 0.4 mg under the tongue every 5 (five) minutes as needed for Chest pain.    [DISCONTINUED] oxyCODONE-acetaminophen (PERCOCET)  mg per tablet Take 1 tablet by mouth every 12 (twelve) hours as needed for Pain.     Family History     None        Tobacco Use    Smoking status: Current Every Day Smoker     Packs/day: 0.25     Years: 50.00     Pack years: 12.50    Smokeless tobacco: Current User     Types: Snuff   Substance and Sexual Activity    Alcohol use: No    Drug use: No    Sexual activity: Not on file     Review of Systems   Constitutional: Positive for activity change and fatigue. Negative for appetite change, chills and fever.   HENT: Negative for congestion, rhinorrhea, sinus pressure, sore throat and trouble swallowing.    Eyes: Negative for pain, redness and visual disturbance.   Respiratory: Positive for cough, chest tightness, shortness of breath and wheezing. Negative for stridor.    Cardiovascular: Positive for chest pain (patient also recent fall onto sternum d/t black episode while drinking). Negative for palpitations and leg swelling.   Gastrointestinal: Positive for abdominal  distention and abdominal pain. Negative for blood in stool, constipation, diarrhea, nausea and vomiting.   Endocrine: Negative for cold intolerance and heat intolerance.   Genitourinary: Negative for dysuria, frequency, hematuria and urgency.   Musculoskeletal: Positive for arthralgias, back pain and gait problem. Negative for myalgias and neck pain.        Uses rolling walker to mobilize around     Skin: Negative for color change, pallor and rash.        Sternal notch hematoma    Allergic/Immunologic: Negative for immunocompromised state.   Neurological: Positive for weakness. Negative for dizziness, tremors, syncope, light-headedness, numbness and headaches.   Hematological: Does not bruise/bleed easily.   Psychiatric/Behavioral: Negative for agitation and confusion. The patient is not nervous/anxious.      Objective:     Vital Signs (Most Recent):  Temp: 99.1 °F (37.3 °C) (10/19/18 1534)  Pulse: 68 (10/19/18 1534)  Resp: 18 (10/19/18 1534)  BP: 131/83 (10/19/18 1534)  SpO2: (!) 93 % (10/19/18 1534) Vital Signs (24h Range):  Temp:  [97.7 °F (36.5 °C)-99.1 °F (37.3 °C)] 99.1 °F (37.3 °C)  Pulse:  [68-99] 68  Resp:  [18] 18  SpO2:  [93 %-94 %] 93 %  BP: (113-131)/(77-86) 131/83     Weight: 109.6 kg (241 lb 10 oz)  Body mass index is 34.67 kg/m².    Physical Exam   Constitutional: He is oriented to person, place, and time. He appears well-developed and well-nourished. No distress.   HENT:   Head: Normocephalic and atraumatic.   Right Ear: External ear normal.   Left Ear: External ear normal.   Nose: Nose normal.   Mouth/Throat: Oropharynx is clear and moist.   Eyes: Conjunctivae and EOM are normal. No scleral icterus.   Neck: Normal range of motion. Neck supple. Hepatojugular reflux and JVD present. No tracheal deviation present. No thyromegaly present.   Cardiovascular: Normal rate, regular rhythm and intact distal pulses. Exam reveals no gallop and no friction rub.   Murmur (high pitched blowing III/VI mitral area)  heard.  Pulmonary/Chest: Effort normal and breath sounds normal. No respiratory distress. He has no wheezes. He has no rales.   Abdominal: Soft. Bowel sounds are normal. He exhibits distension. He exhibits no mass. There is no tenderness. There is no rebound and no guarding.   Musculoskeletal: Normal range of motion. He exhibits edema (slight ble). He exhibits no tenderness.   Neurological: He is alert and oriented to person, place, and time. No cranial nerve deficit or sensory deficit. He exhibits normal muscle tone. Coordination abnormal.   Skin: Skin is warm and dry. No rash noted. No erythema.   Psychiatric: He has a normal mood and affect. His behavior is normal. Judgment and thought content normal.   Nursing note and vitals reviewed.        CRANIAL NERVES     CN III, IV, VI   Extraocular motions are normal.        Significant Labs:   A1C:   Recent Labs   Lab 10/19/18  1358   HGBA1C 4.9     CBC: No results for input(s): WBC, HGB, HCT, PLT in the last 48 hours.  CMP:   Recent Labs   Lab 10/19/18  1358      K 4.2   CL 98   CO2 27   GLU 86   BUN 39*   CREATININE 1.1   CALCIUM 10.0   PROT 6.9   ALBUMIN 3.3*   BILITOT 2.0*   ALKPHOS 133   AST 32   ALT 25   ANIONGAP 12   EGFRNONAA >60.0     Cardiac Markers:   Recent Labs   Lab 10/19/18  1358   *     TSH:   Recent Labs   Lab 10/19/18  1358   TSH 0.550       Significant Imaging: Echo: I have reviewed all pertinent results/findings within the past 24 hours and my personal findings are:  pending  EKG: I have reviewed all pertinent results/findings within the past 24 hours and my personal findings are: V paced 97 bpm     Interrogation:   Impedence fine, no lead issues  ? 2:1 flutter, rate 100, afib burden reported but not in afib   10/9/18 One episode that fell into VF zone, converted with one round of ATP so not VF.

## 2018-10-19 NOTE — ASSESSMENT & PLAN NOTE
- Referred to CT to evaluate restenosis of bioprosthetic valve  - CT surgery consulted   - holding xarelto for potential procedures

## 2018-10-19 NOTE — PROGRESS NOTES
Patient admitted to CTSU. Patient alert and oriented to room.  Patient connected to telemetry, assessed, oriented to room, and instructed to call for assistance or any needs. Call bell in reach. Reviewed goals for today. Alba Flores NP notified of patient's arrival.  Will continue to monitor

## 2018-10-19 NOTE — PLAN OF CARE
Problem: Patient Care Overview  Goal: Plan of Care Review  Outcome: Revised  Plan of care discussed with patient. Patient is free of fall/trauma/injury.  CTS and EP Consulted for eval.   All questions addressed. Will continue to monitor

## 2018-10-19 NOTE — SUBJECTIVE & OBJECTIVE
History reviewed. No pertinent past medical history.  Past Surgical History:   Procedure Laterality Date    EXTRACTION-LEAD N/A 2/12/2018    Performed by Jose Fitzgerald MD at [facility] OR 2ND FLR    EXTRACTION-LEAD N/A 2/12/2018    Performed by Jose Fitzgerald MD at [facility] CATH LAB    INSERTION-ICD-BIVENTRICULAR N/A 2/12/2018    Performed by Jose Fitzgerald MD at [facility] CATH LAB     History reviewed. No pertinent family history.  Social History     Tobacco Use    Smoking status: Current Every Day Smoker     Packs/day: 0.25     Years: 50.00     Pack years: 12.50    Smokeless tobacco: Current User     Types: Snuff   Substance Use Topics    Alcohol use: No    Drug use: No     Review of patient's allergies indicates:  No Known Allergies    Medications: I have reviewed the current medication administration record.    Medications Prior to Admission   Medication Sig Dispense Refill Last Dose    albuterol (PROAIR HFA) 90 mcg/actuation inhaler Inhale 2 puffs into the lungs every 6 (six) hours as needed for Wheezing. Rescue   10/18/2018 at 2100                                                                     atorvastatin (LIPITOR) 40 MG tablet Take 40 mg by mouth once daily.   10/18/2018 at 2100    budesonide-formoterol 160-4.5 mcg (SYMBICORT) 160-4.5 mcg/actuation HFAA Inhale 2 puffs into the lungs every 12 (twelve) hours. Controller   10/18/2018 at Unknown time    furosemide (LASIX) 40 MG tablet Take 1 tablet (40 mg total) by mouth 2 (two) times daily. (Patient taking differently: Take 80 mg by mouth 2 (two) times daily. ) 60 tablet 3 10/18/2018 at Unknown time    hydrALAZINE (APRESOLINE) 25 MG tablet Take 1 tablet (25 mg total) by mouth 3 (three) times daily. Do not take this medicine until you see your PCP in 1 week   10/18/2018 at Unknown time    isosorbide mononitrate (IMDUR) 30 MG 24 hr tablet Take 1 tablet (30 mg total) by mouth once daily. 30 tablet 3 10/18/2018 at Unknown time     magnesium oxide (MAG-OX) 400 mg (241.3 mg magnesium) tablet Take 400 mg by mouth once daily.   10/18/2018 at Unknown time    metOLazone (ZAROXOLYN) 5 MG tablet Take 5 mg by mouth as needed. Twice weekly as needed       metoprolol succinate (TOPROL-XL) 25 MG 24 hr tablet Take 12.5 mg by mouth once daily.   10/18/2018 at Unknown time    potassium chloride (KLOR-CON) 10 MEQ TbSR Take 20 mEq by mouth 3 (three) times daily. 2  Caps tid   10/18/2018 at Unknown time    ranolazine (RANEXA) 500 MG Tb12 Take 500 mg by mouth 2 (two) times daily.   10/18/2018 at Unknown time    rivaroxaban (XARELTO) 20 mg Tab Take 1 tablet (20 mg total) by mouth once daily. Do not take this medication for 5 days after procedure (restart on 2/18/2018) (Patient taking differently: Take 20 mg by mouth once daily. )   10/18/2018 at Unknown time    sacubitril-valsartan (ENTRESTO) 49-51 mg per tablet Take 1 tablet by mouth 2 (two) times daily.   10/18/2018 at 2100    acetaminophen (TYLENOL) 325 MG tablet Take 2 tablets (650 mg total) by mouth every 4 (four) hours as needed.  0 Unknown at Unknown time    amiodarone (PACERONE) 200 MG Tab Take 1 tablet (200 mg total) by mouth 2 (two) times daily. Take 200mg twice a day until seen by Dr. Fitzgerald in clinic (Patient taking differently: Take 200 mg by mouth once daily. Take 200mg a day at home not listed in transfer med list, though he said he took it yesterday) 60 tablet 3 Unknown at Unknown time    nitroGLYCERIN (NITROSTAT) 0.4 MG SL tablet Place 0.4 mg under the tongue every 5 (five) minutes as needed for Chest pain.   Unknown at Unknown time       Review of Systems   Constitutional: Negative for fatigue and fever.   HENT: Negative for drooling.    Eyes: Negative for visual disturbance.   Respiratory: Negative for stridor.    Cardiovascular: Positive for palpitations.   Gastrointestinal: Negative for abdominal pain.   Genitourinary:        (+) documented urinary incontinence   Skin: Negative  for pallor.   Allergic/Immunologic: Negative for immunocompromised state.   Neurological: Positive for speech difficulty and weakness. Negative for headaches.   Hematological:        (+) on xarelto - DVT/afib   Psychiatric/Behavioral: Positive for confusion.     Objective:     Vital Signs (Most Recent):  Temp: 99.1 °F (37.3 °C) (10/19/18 1534)  Pulse: (!) 130 (10/19/18 1824)  Resp: (!) 22 (10/19/18 1823)  BP: 125/74 (10/19/18 1824)  SpO2: (!) 94 % (10/19/18 1824)    Vital Signs Range (Last 24H):  Temp:  [97.7 °F (36.5 °C)-99.1 °F (37.3 °C)]   Pulse:  []   Resp:  [18-22]   BP: (113-131)/(74-86)   SpO2:  [93 %-94 %]     Physical Exam   Constitutional: He appears well-developed and well-nourished.   HENT:   Head: Normocephalic and atraumatic.   Eyes: EOM are normal.   Cardiovascular:   Rate variable - up to 120 as low as 70    Pulmonary/Chest: Effort normal.   Abdominal: Soft.   Musculoskeletal: Normal range of motion.   Neurological: He is alert.   Skin: Skin is warm and dry.   Psychiatric:   Disoriented initially, slow to respond   Nursing note and vitals reviewed.      Neurological Exam:   LOC: alert  Attention Span: poor  Language: No aphasia  Articulation: No dysarthria  Orientation: oriented to person and time - age and month correct  Visual Fields: Full  EOM (CN III, IV, VI): Full/intact  Pupils (CN II, III): PERRL  Facial Movement (CN VII): Symmetric facial expression    Gag Reflex: present  Motor: Arm left  Normal 5/5  Leg left  Paresis: 3/5  Arm right  Normal 5/5  Leg right Paresis: 4/5  Cebellar: No evidence of appendicular or axial ataxia  Sensation: Intact to light touch, temperature and vibration  Tone: Normal tone throughout      Laboratory:  CMP:   Recent Labs   Lab 10/19/18  1358   CALCIUM 10.0   ALBUMIN 3.3*   PROT 6.9      K 4.2   CO2 27   CL 98   BUN 39*   CREATININE 1.1   ALKPHOS 133   ALT 25   AST 32   BILITOT 2.0*     CBC: No results for input(s): WBC, RBC, HGB, HCT, PLT, MCV, MCH,  MCHC in the last 168 hours.  Lipid Panel: No results for input(s): CHOL, LDLCALC, HDL, TRIG in the last 168 hours.  Coagulation: No results for input(s): PT, INR, APTT in the last 168 hours.  Hgb A1C:   Recent Labs   Lab 10/19/18  1358   HGBA1C 4.9     TSH:   Recent Labs   Lab 10/19/18  1358   TSH 0.550       Diagnostic Results:      Brain imaging:  CTA head and neck pending read  No obvious hemorrhage when reviewed during scanning     2d echo -10/19/18  Moderately enlarged LA    1 - Severely depressed left ventricular systolic function (EF 20-25%).     2 - Concentric remodeling.     3 - Biatrial enlargement.     4 - Low normal to mildly depressed right ventricular systolic function .     5 - Mitral valve prosthesis.     6 - Moderate tricuspid regurgitation.     7 - Increased central venous pressure.     8 - Pulmonary hypertension. The estimated PA systolic pressure is 52 mmHg.

## 2018-10-19 NOTE — PLAN OF CARE
10/19/18 1254   Discharge Assessment   Assessment Type Discharge Planning Assessment   Confirmed/corrected address and phone number on facesheet? Yes   Assessment information obtained from? Patient;Medical Record   Expected Length of Stay (days) 7   Communicated expected length of stay with patient/caregiver yes   Prior to hospitilization cognitive status: Alert/Oriented   Prior to hospitalization functional status: Independent   Current cognitive status: Alert/Oriented   Current Functional Status: Independent   Facility Arrived From: [facility]   Lives With alone   Able to Return to Prior Arrangements yes   Is patient able to care for self after discharge? Yes   Patient's perception of discharge disposition home or selfcare;home health   Readmission Within The Last 30 Days no previous admission in last 30 days   Patient currently being followed by outpatient case management? No   Patient currently receives any other outside agency services? Yes   Name and contact number of agency or person providing outside services [facility]   Equipment Currently Used at Home rollator   Do you have any problems affording any of your prescribed medications? TBD   Does the patient have transportation home? No   Does the patient receive services at the [facility]? No   Discharge Plan A Home;Home Health   Transferred from [facility] for MS and ICD lead displacement. Lives alone and is independent with use of rollator. Current with [facility]. Plan is to DC home.

## 2018-10-19 NOTE — HPI
63 year old male presented initially to an outside hospital for evaluation of dyspnea, later transferred here on 10/19/18 for evaluation as a potential candidate for mitral valve replacement. The patient with history significant for afib/flutter, vfib, mitral valve replacement, CHF dilated cardiomyopathy, HTN, HLD, AICD (2/2018), pulm htn, tobacco and daily alcohol use, CAD, and DVT (L) who is anticoagulated on xarelto - last dose given on 10/18/18 21:36 (documented from outside record med admin).     At approximately 5:55 pm today the patient was found to be less conversive, and less responsive with a blank stare, urinary incontinence.   The patients team called a stroke code, first RN to come to bedside as a part of the rapid response team noted him to have some drift in the LUE /LLE. During the course of the assessment, the patient became more responsive and interactive.   Patient denies headache, stomach pain, vision changes, speech changes, weakness.   At baseline, the patient ambulates with a walker.     The patient was taken to CT. Case discussed with primary team. Outside records reviewed.

## 2018-10-19 NOTE — PLAN OF CARE
Consult noted for this 63 year old male with DCM s/p bi V AICD, s/p CABG/bioprosthetic MVR in 2014, HFrEF (EF 38, echo 8/2017), HTN, Afib/Aflutter, Vfib/Vtach, former EtOH abuse,  And tobacco use is admitted to the medicine service.  CTS consulted for mitral stenosis.    Attempted to obtain history from the patient about what brought him into the hospital.  He is very short of breath and kept dosing off to sleep.  He says that his shortness of breath has progressively worsened.  Our office attempted to schedule him an appointment on 10/10/2018 to see us as an outpatient and apparently he said he would call us back when he could arrange transportation from [place].    He requires a walker for ambulation due to weakness.    Data:  2D echo:  1 - Severely depressed left ventricular systolic function (EF 20-25%).     2 - Concentric remodeling.     3 - Biatrial enlargement.     4 - Low normal to mildly depressed right ventricular systolic function .     5 - Mitral valve prosthesis.     6 - Moderate tricuspid regurgitation.     7 - Increased central venous pressure.     8 - Pulmonary hypertension. The estimated PA systolic pressure is 52 mmHg.   LVEDD 4.8 cm    His labs are reviewed.  Noted is a T-bili of 2.0 and normal creatinine.    Recommend medical optimization for now.  Need to obtain copy of his operative report from [place].  Dr. Jane will staff this weekend.

## 2018-10-19 NOTE — ASSESSMENT & PLAN NOTE
Pt here with possible severe mitral stenosis and possible MVR. Today on interrogation of device noted to be in atrial flutter. He has apparently not taken his medications in months. Would recommend starting on heparin gtt as this would be valvular atrial fibrillation. Would recommend NITA/DCCV on Monday if he remains in flutter.

## 2018-10-19 NOTE — ASSESSMENT & PLAN NOTE
- xarelto on hold for possible upcoming surgical procedures  - consider heparin gtt if rhythm warrants

## 2018-10-19 NOTE — ASSESSMENT & PLAN NOTE
- h/o BIV ICD extract implant 2/18, case complicated by lead retention  - EP consulted, h/o VT/VF, no amiodarone filled since May 2018

## 2018-10-19 NOTE — SUBJECTIVE & OBJECTIVE
History reviewed. No pertinent past medical history.    Past Surgical History:   Procedure Laterality Date    EXTRACTION-LEAD N/A 2/12/2018    Performed by [facility] at [facility] OR 2ND FLR    EXTRACTION-LEAD N/A 2/12/2018    Performed by [facility] at [facility] CATH LAB    INSERTION-ICD-BIVENTRICULAR N/A 2/12/2018    Performed by Jose [facility] MD at [facility] CATH LAB       Review of patient's allergies indicates:  No Known Allergies    No current facility-administered medications on file prior to encounter.      Current Outpatient Medications on File Prior to Encounter   Medication Sig    albuterol (PROAIR HFA) 90 mcg/actuation inhaler Inhale 2 puffs into the lungs every 6 (six) hours as needed for Wheezing. Rescue    atorvastatin (LIPITOR) 40 MG tablet Take 40 mg by mouth once daily.    budesonide-formoterol 160-4.5 mcg (SYMBICORT) 160-4.5 mcg/actuation HFAA Inhale 2 puffs into the lungs every 12 (twelve) hours. Controller    furosemide (LASIX) 40 MG tablet Take 1 tablet (40 mg total) by mouth 2 (two) times daily. (Patient taking differently: Take 80 mg by mouth 2 (two) times daily. )    hydrALAZINE (APRESOLINE) 25 MG tablet Take 1 tablet (25 mg total) by mouth 3 (three) times daily. Do not take this medicine until you see your PCP in 1 week    isosorbide mononitrate (IMDUR) 30 MG 24 hr tablet Take 1 tablet (30 mg total) by mouth once daily.    magnesium oxide (MAG-OX) 400 mg (241.3 mg magnesium) tablet Take 400 mg by mouth once daily.    metOLazone (ZAROXOLYN) 5 MG tablet Take 5 mg by mouth as needed. Twice weekly as needed    metoprolol succinate (TOPROL-XL) 25 MG 24 hr tablet Take 12.5 mg by mouth once daily.    potassium chloride (KLOR-CON) 10 MEQ TbSR Take 20 mEq by mouth 3 (three) times daily. 2  Caps tid    ranolazine (RANEXA) 500 MG Tb12 Take 500 mg by mouth 2 (two) times daily.    rivaroxaban (XARELTO) 20 mg Tab Take 1 tablet (20 mg total) by mouth once daily. Do not  take this medication for 5 days after procedure (restart on 2/18/2018) (Patient taking differently: Take 20 mg by mouth once daily. )    sacubitril-valsartan (ENTRESTO) 49-51 mg per tablet Take 1 tablet by mouth 2 (two) times daily.    [DISCONTINUED] ondansetron (ZOFRAN) 8 MG tablet Take 8 mg by mouth 2 (two) times daily as needed for Nausea.    acetaminophen (TYLENOL) 325 MG tablet Take 2 tablets (650 mg total) by mouth every 4 (four) hours as needed.    amiodarone (PACERONE) 200 MG Tab Take 1 tablet (200 mg total) by mouth 2 (two) times daily. Take 200mg twice a day until seen by Dr. Fitzgerald in clinic (Patient taking differently: Take 200 mg by mouth once daily. Take 200mg a day at home not listed in transfer med list, though he said he took it yesterday)    nitroGLYCERIN (NITROSTAT) 0.4 MG SL tablet Place 0.4 mg under the tongue every 5 (five) minutes as needed for Chest pain.    [DISCONTINUED] oxyCODONE-acetaminophen (PERCOCET)  mg per tablet Take 1 tablet by mouth every 12 (twelve) hours as needed for Pain.     Family History     None        Tobacco Use    Smoking status: Current Every Day Smoker     Packs/day: 0.25     Years: 50.00     Pack years: 12.50    Smokeless tobacco: Current User     Types: Snuff   Substance and Sexual Activity    Alcohol use: No    Drug use: No    Sexual activity: Not on file     Review of Systems   All other systems reviewed and are negative.    Objective:     Vital Signs (Most Recent):  Temp: 99.1 °F (37.3 °C) (10/19/18 1534)  Pulse: 68 (10/19/18 1534)  Resp: 18 (10/19/18 1534)  BP: 131/83 (10/19/18 1534)  SpO2: (!) 93 % (10/19/18 1534) Vital Signs (24h Range):  Temp:  [97.7 °F (36.5 °C)-99.1 °F (37.3 °C)] 99.1 °F (37.3 °C)  Pulse:  [68-99] 68  Resp:  [18] 18  SpO2:  [93 %-94 %] 93 %  BP: (113-131)/(77-86) 131/83       Weight: 109.6 kg (241 lb 10 oz)  Body mass index is 34.67 kg/m².    SpO2: (!) 93 %  O2 Device (Oxygen Therapy): room air    Physical Exam  GEN:  Alert and oriented in NAD  NECK: no JVD appreciated   CVS: RRR, s1/s2, no MRG  PULM: CTAB no rales  ABD: NT/ND BS +  Extremities: warm and dry, palpable pulses, no edema  NEURO: Alert and oriented x 3  PSYCH: appropriate affect.         Significant Labs: All pertinent lab results from the last 24 hours have been reviewed.    Significant Imaging: reviewed

## 2018-10-19 NOTE — CODE DOCUMENTATION
Transport arrived at 1755 to take patient to xray. Patient not following commands and had blank stare on face.  Patient appeared to have urinated on himself. Alba Flores, NP notified.  Charge nurse notified arrived to bedside.  Stroke code initiated 1800.  Alba Flores, House Supervisor, Rapid response nurse, and stroke team arrived to bedside at 1803.  Patient assessed by rapid nurse.  Orders placed for stat CT.  Patient placed on portable monitoring and portable O2, and taken to CT by stroke team and rapid response nurse.

## 2018-10-20 PROBLEM — I63.9 STROKE: Status: ACTIVE | Noted: 2018-10-20

## 2018-10-20 PROBLEM — R41.82 ALTERED MENTAL STATUS: Status: RESOLVED | Noted: 2018-10-19 | Resolved: 2018-10-20

## 2018-10-20 LAB
ANION GAP SERPL CALC-SCNC: 11 MMOL/L
APTT BLDCRRT: 27.4 SEC
APTT BLDCRRT: 36.1 SEC
BASOPHILS # BLD AUTO: 0.01 K/UL
BASOPHILS NFR BLD: 0.2 %
BUN SERPL-MCNC: 33 MG/DL
CALCIUM SERPL-MCNC: 9.3 MG/DL
CHLORIDE SERPL-SCNC: 96 MMOL/L
CHOLEST SERPL-MCNC: 98 MG/DL
CHOLEST/HDLC SERPL: 3.9 {RATIO}
CO2 SERPL-SCNC: 29 MMOL/L
CREAT SERPL-MCNC: 1.1 MG/DL
DIFFERENTIAL METHOD: ABNORMAL
EOSINOPHIL # BLD AUTO: 0 K/UL
EOSINOPHIL NFR BLD: 0.2 %
ERYTHROCYTE [DISTWIDTH] IN BLOOD BY AUTOMATED COUNT: 17.3 %
EST. GFR  (AFRICAN AMERICAN): >60 ML/MIN/1.73 M^2
EST. GFR  (NON AFRICAN AMERICAN): >60 ML/MIN/1.73 M^2
GLUCOSE SERPL-MCNC: 91 MG/DL
HCT VFR BLD AUTO: 32.8 %
HDLC SERPL-MCNC: 25 MG/DL
HDLC SERPL: 25.5 %
HGB BLD-MCNC: 10.4 G/DL
IMM GRANULOCYTES # BLD AUTO: 0.04 K/UL
IMM GRANULOCYTES NFR BLD AUTO: 0.9 %
LDLC SERPL CALC-MCNC: 60.2 MG/DL
LYMPHOCYTES # BLD AUTO: 0.7 K/UL
LYMPHOCYTES NFR BLD: 15.5 %
MAGNESIUM SERPL-MCNC: 2 MG/DL
MCH RBC QN AUTO: 31.8 PG
MCHC RBC AUTO-ENTMCNC: 31.7 G/DL
MCV RBC AUTO: 100 FL
MONOCYTES # BLD AUTO: 0.6 K/UL
MONOCYTES NFR BLD: 12.9 %
NEUTROPHILS # BLD AUTO: 3.3 K/UL
NEUTROPHILS NFR BLD: 70.3 %
NONHDLC SERPL-MCNC: 73 MG/DL
NRBC BLD-RTO: 0 /100 WBC
PLATELET # BLD AUTO: 153 K/UL
PMV BLD AUTO: 11.2 FL
POTASSIUM SERPL-SCNC: 3.8 MMOL/L
RBC # BLD AUTO: 3.27 M/UL
SODIUM SERPL-SCNC: 136 MMOL/L
TRIGL SERPL-MCNC: 64 MG/DL
TROPONIN I SERPL DL<=0.01 NG/ML-MCNC: 0.2 NG/ML
WBC # BLD AUTO: 4.65 K/UL

## 2018-10-20 PROCEDURE — 84484 ASSAY OF TROPONIN QUANT: CPT

## 2018-10-20 PROCEDURE — 95951 PR EEG MONITORING/VIDEORECORD: CPT | Mod: 26,,, | Performed by: PSYCHIATRY & NEUROLOGY

## 2018-10-20 PROCEDURE — 36415 COLL VENOUS BLD VENIPUNCTURE: CPT

## 2018-10-20 PROCEDURE — 20600001 HC STEP DOWN PRIVATE ROOM

## 2018-10-20 PROCEDURE — 94761 N-INVAS EAR/PLS OXIMETRY MLT: CPT

## 2018-10-20 PROCEDURE — 85730 THROMBOPLASTIN TIME PARTIAL: CPT

## 2018-10-20 PROCEDURE — 25000003 PHARM REV CODE 250: Performed by: NURSE PRACTITIONER

## 2018-10-20 PROCEDURE — 83735 ASSAY OF MAGNESIUM: CPT

## 2018-10-20 PROCEDURE — 93005 ELECTROCARDIOGRAM TRACING: CPT

## 2018-10-20 PROCEDURE — 80048 BASIC METABOLIC PNL TOTAL CA: CPT

## 2018-10-20 PROCEDURE — 63600175 PHARM REV CODE 636 W HCPCS: Performed by: NURSE PRACTITIONER

## 2018-10-20 PROCEDURE — 80061 LIPID PANEL: CPT

## 2018-10-20 PROCEDURE — 99232 SBSQ HOSP IP/OBS MODERATE 35: CPT | Mod: ,,, | Performed by: NURSE PRACTITIONER

## 2018-10-20 PROCEDURE — 85025 COMPLETE CBC W/AUTO DIFF WBC: CPT

## 2018-10-20 PROCEDURE — 93010 ELECTROCARDIOGRAM REPORT: CPT | Mod: ,,, | Performed by: INTERNAL MEDICINE

## 2018-10-20 PROCEDURE — 63600175 PHARM REV CODE 636 W HCPCS: Performed by: HOSPITALIST

## 2018-10-20 PROCEDURE — 95951 HC EEG MONITORING/VIDEO RECORD: CPT

## 2018-10-20 PROCEDURE — 99233 SBSQ HOSP IP/OBS HIGH 50: CPT | Mod: GC,,, | Performed by: INTERNAL MEDICINE

## 2018-10-20 PROCEDURE — 27000221 HC OXYGEN, UP TO 24 HOURS

## 2018-10-20 PROCEDURE — S4991 NICOTINE PATCH NONLEGEND: HCPCS | Performed by: NURSE PRACTITIONER

## 2018-10-20 RX ORDER — POTASSIUM CHLORIDE 750 MG/1
20 CAPSULE, EXTENDED RELEASE ORAL ONCE
Status: COMPLETED | OUTPATIENT
Start: 2018-10-20 | End: 2018-10-20

## 2018-10-20 RX ORDER — FUROSEMIDE 10 MG/ML
80 INJECTION INTRAMUSCULAR; INTRAVENOUS 3 TIMES DAILY
Status: DISCONTINUED | OUTPATIENT
Start: 2018-10-20 | End: 2018-10-20

## 2018-10-20 RX ORDER — MORPHINE SULFATE 4 MG/ML
2 INJECTION, SOLUTION INTRAMUSCULAR; INTRAVENOUS
Status: DISCONTINUED | OUTPATIENT
Start: 2018-10-20 | End: 2018-10-20

## 2018-10-20 RX ORDER — KETOROLAC TROMETHAMINE 30 MG/ML
30 INJECTION, SOLUTION INTRAMUSCULAR; INTRAVENOUS ONCE AS NEEDED
Status: ACTIVE | OUTPATIENT
Start: 2018-10-20 | End: 2018-10-20

## 2018-10-20 RX ORDER — FUROSEMIDE 10 MG/ML
80 INJECTION INTRAMUSCULAR; INTRAVENOUS 2 TIMES DAILY
Status: DISCONTINUED | OUTPATIENT
Start: 2018-10-20 | End: 2018-10-20

## 2018-10-20 RX ADMIN — SODIUM CHLORIDE 250 ML: 0.9 INJECTION, SOLUTION INTRAVENOUS at 05:10

## 2018-10-20 RX ADMIN — RANOLAZINE 500 MG: 500 TABLET, FILM COATED, EXTENDED RELEASE ORAL at 08:10

## 2018-10-20 RX ADMIN — SODIUM CHLORIDE 250 ML: 0.9 INJECTION, SOLUTION INTRAVENOUS at 10:10

## 2018-10-20 RX ADMIN — HYDRALAZINE HYDROCHLORIDE 25 MG: 25 TABLET ORAL at 09:10

## 2018-10-20 RX ADMIN — HEPARIN SODIUM AND DEXTROSE 14 UNITS/KG/HR: 10000; 5 INJECTION INTRAVENOUS at 02:10

## 2018-10-20 RX ADMIN — ATORVASTATIN CALCIUM 40 MG: 20 TABLET, FILM COATED ORAL at 08:10

## 2018-10-20 RX ADMIN — POTASSIUM CHLORIDE 20 MEQ: 1500 TABLET, EXTENDED RELEASE ORAL at 09:10

## 2018-10-20 RX ADMIN — METOPROLOL SUCCINATE 12.5 MG: 25 TABLET, EXTENDED RELEASE ORAL at 09:10

## 2018-10-20 RX ADMIN — FLUTICASONE FUROATE AND VILANTEROL TRIFENATATE 1 PUFF: 100; 25 POWDER RESPIRATORY (INHALATION) at 08:10

## 2018-10-20 RX ADMIN — MORPHINE SULFATE 2 MG: 4 INJECTION, SOLUTION INTRAMUSCULAR; INTRAVENOUS at 05:10

## 2018-10-20 RX ADMIN — RANOLAZINE 500 MG: 500 TABLET, FILM COATED, EXTENDED RELEASE ORAL at 09:10

## 2018-10-20 RX ADMIN — HEPARIN SODIUM AND DEXTROSE 17 UNITS/KG/HR: 10000; 5 INJECTION INTRAVENOUS at 05:10

## 2018-10-20 RX ADMIN — ISOSORBIDE MONONITRATE 30 MG: 30 TABLET, EXTENDED RELEASE ORAL at 09:10

## 2018-10-20 RX ADMIN — SENNOSIDES AND DOCUSATE SODIUM 1 TABLET: 8.6; 5 TABLET ORAL at 09:10

## 2018-10-20 RX ADMIN — POTASSIUM CHLORIDE 20 MEQ: 750 CAPSULE, EXTENDED RELEASE ORAL at 09:10

## 2018-10-20 RX ADMIN — SACUBITRIL AND VALSARTAN 1 TABLET: 49; 51 TABLET, FILM COATED ORAL at 09:10

## 2018-10-20 RX ADMIN — FUROSEMIDE 80 MG: 10 INJECTION, SOLUTION INTRAMUSCULAR; INTRAVENOUS at 09:10

## 2018-10-20 RX ADMIN — SENNOSIDES AND DOCUSATE SODIUM 1 TABLET: 8.6; 5 TABLET ORAL at 08:10

## 2018-10-20 NOTE — ASSESSMENT & PLAN NOTE
- patient actually wasn't taking three of his meds, he cannot remember which three  - now hypotensive from entresto, BB, imdur, hydralazine will hold since recent aphasia episode vs stroke vs seizure

## 2018-10-20 NOTE — PROGRESS NOTES
10/20/18 1031   Vital Signs   BP (!) 80/64   MAP (mmHg) 69   Upon recheck, BP is still low. NP ordered 250mL saline bolus to be given over 3 hours. Orders carried out and will continue to monitor.

## 2018-10-20 NOTE — HOSPITAL COURSE
Mr. Mckeon was transferred 10/19 due to CHF exacerbation in setting of potential MV restenosis. 2D echo completed on arrival with EF 20-25% (decline from 30-35% previously), concentric remodeling, KRYSTAL, mildly depressed RV systolic function, moderate TR, pulmHTN with PASP 52, RAP 15, and MV with mean gradient obtained across the mitral valve is 10 mmHg, effective prosthetic valve area is 3.2 cm2, and effective prosthetic valve area corrected for BSA is 1.43 cm2. He was evaluated by CTS on arrival with recommendations to optimize from a heart failure standpoint with arrhythmia control as he is a poor candidate for redo surgery. Repeat 2D echo performed to establish true gradient, which is currently 7mmHg. Due to atrial arrhythmia and concern for crushed ICD lead EP was consulted, recommended continuing OAC as surgical procedures are not indicated and continuing rate control with lopressor and dig load, NITA + for thrombus on posterior leaflet of MV.  Thrombus in Right atrial appendage with clot adhered to RA lead possibly.  NO DCCV performed d/t thrombus burden. Cardiology co-managed consulted and assisted with CHF management, plan to continue digoxin, lopressor, and increase losartan to 25mg.  Diuresed with IVP lasix, currently net negative 4.5 liters and weight decreased, physical exam has improved >> transitioned to PO maintenance dose 80 mg bid.  He has coke colored urine from hemoglobinuria from hemolysis d/t the valvular stenosis and jet's are blowing against septum on both sides.     After admission course complicated due to hypotension, AMS, and chest pain. Home medications resumed with resultant  hypotensive episode (likely due to known medication noncompliance) and associated episode of aphasia and Left sided weakness, stroke code was called; head CT negative. Neurology was also concerned for seizure; EEG negative for seizure activity. He has an MRI compatible device however due to safety concerns with  unattached ICD lead scan not performed. He was given IVF bolus over several hours with minimal improvement in BPs and urine output (now resolved), nor was hydration complicated by pulmonary edema. He does not recall the events and has had no further neurological events since and remains alert and oriented. He also had two episodes of severe chest pain, rate 10/10 that was reproducible on exam (likely related to chest trauma in setting of fall PTA), repeat EKG with v paced rhythm and troponin trend flat. Pain was managed with IV morphine and has since resolved.     Disposition plans: plan to DC home, home health to assist in CHF monitoring and medication compliance at DC. He will need outpt follow up with CTS, EP, and primary Cardiologist/ Donaldo Headley NP notified and appt made.

## 2018-10-20 NOTE — PROGRESS NOTES
Repeated calls made to EEG unasnwered. Contacted neuro team and they gave number that went unanswered. Called neuro ICU for assistance and they stated that they saw a tech in the [facility]ung and would give them RNs number. Still have not gotten in contact with them, Alba BRISCOE aware will continue to try.

## 2018-10-20 NOTE — ASSESSMENT & PLAN NOTE
Pt here with possible severe mitral stenosis and possible MVR. Today on interrogation of device noted to be in atrial flutter. He has apparently not taken his medications in months.     - continue heparin gtt  - will consider NITA/DCCV on Monday if he remains in flutter

## 2018-10-20 NOTE — PT/OT/SLP PROGRESS
Physical Therapy      Patient Name:  Quentin Mckeon   MRN:  [id number]    Patient not seen today secondary to (pt on hold per RN 2nd to low BP with c/o dizziness. ). Will follow-up at a later date.    Shirlene Camejo, PT   10/20/2018

## 2018-10-20 NOTE — PROGRESS NOTES
[facility] Hospital Medicine  Progress Note    Patient Name: Quentin Mckeon  MRN: [id number]  Patient Class: IP- Inpatient   Admission Date: 10/19/2018  Length of Stay: 1 days  Attending Physician: Justin Robles MD  Primary Care Provider: Primary Doctor [facility] Medicine Team: [facility] [place] Chaya Flores NP    Subjective:     Principal Problem:Acute on chronic systolic heart failure    HPI:  62 y/o AAM with PMH of sCHF (EF 35%), VF/VT s/p BiV AICD (extraction and reimplantation 2/2018 with some lead retention d/t inability to remove as it's stuck under clavicle), CAD s/p CABG 2014, MV bioprosthetic repair now with severe restenosis 2014, AF on Xarelto, + daily smoker, + etoh abuse (most recent drink about a week ago, passed out and hit chest on a kitchen cabinet, has not drank since, no h/o DT's) who presented to [facility] with SOB, chest pain x 3 days.  He was diagnosed with ADHF  secondary to mitral bioprosthetic restenosis. He had a prior admission for cough, body aches, productive cough, 10/12 flu swab negative so abx were stopped.  He admits to eating out, not watching his salt, per home pharmacy records he has not filled is amiodarone since 5/15/18.  He also appears to have been out of a most of his medications for at least a month or more.  Last fills are in home med rec tab.  Pt has had frequent readmissions suspected to be 2/2 MV stenosis.  In addition, pt had fall prior to admission which has not affected his AICD lead per interrogation, impedance normal.  There was a concern that he may have had a lead crush injury as he had a small sternal hematoma after passing out during a etoh induced binge and fell on kitchen cabinet.  His Xarelto was held initially but was restarted OSH. Interrogation also revealed what appears to be 2:1 flutter at rate of 100.  He had one episode on 10/19 that fell into the VF zone for which he was ATP'ed out of in one round.  Pt  transferred to [facility] for possible AICD extraction by Dr. Fitzgerald and consideration of MVR by Dr. Ortiz; case d/w these consultants per Dr. Jennifer Johnson note.           Hospital Course:  Transferred from [facility] to be evaluated by CTS for restenosis of bioprosthetic mitral valve and concern for crush injury to ICD lead d/t recent fall from black out episode while intoxicated and severe sternal contusion resulted.  CTS and EP consulted.  CTS awaiting surgical history from OSH, he was supposed to see them earlier this month but missed d/t transportation issues.  He lives alone and uses a walker for mobility d/t weakness.  [employer] Biv/ICD interrogated as noted to be in atrial flutter which could be exacerbating his underlying heart failure.  Started on heparin gtt as this is valvular afib.  Planning NITA/ DCCV Monday if remains in flutter.      On admission his meds were reviewed and he stated he was taking all his medications as prescribed.  Of note they were ordered and he received them with subsequent drop in his b/p from 120's to 90's.  He had an episode of aphasia and Left sided weakness, stroke code was called; head CT negative, pending MRI Monday as they only do MRI compatible devices M-F with rep present.  Serial number is in EP note dated 10/20/18.  Vascular neurology is concerned for seizure vs stroke.  Holding all b/p meds, EEG in process.  He does not have any recollection of events at all.      He also had an episode of 10/10 chest pain, ECG V paced 101 bpm, with absolutely reproducible chest pain with mild palpation; appears he's having muscle spasms related to his fall onto his sternum.     Dispo; will need transportation back to [place]    Interval History:  EEG monitoring underway as he had a stroke code vs seizure episode yesterday evening head ct negative, today c/o 10/10 cp -> reproducible given morphine for pain and then will give toradol if persists.  Otherwise,     Review of Systems    Constitutional: Positive for activity change and fatigue. Negative for appetite change, chills and fever.   HENT: Negative for congestion and trouble swallowing.    Eyes: Negative for visual disturbance.   Respiratory: Positive for cough, chest tightness and shortness of breath. Negative for wheezing.    Cardiovascular: Positive for chest pain (patient also recent fall onto sternum d/t black episode while drinking). Negative for palpitations and leg swelling.   Gastrointestinal: Positive for abdominal distention and abdominal pain. Negative for constipation, nausea and vomiting.   Genitourinary: Negative for frequency and hematuria.   Musculoskeletal: Positive for arthralgias, back pain and gait problem. Negative for myalgias and neck pain.        Uses rolling walker to mobilize around     Skin: Negative for color change, pallor and rash.        Sternal notch hematoma    Neurological: Positive for dizziness, weakness and light-headedness. Negative for syncope, numbness and headaches.   Hematological: Bruises/bleeds easily.   Psychiatric/Behavioral: Negative for agitation and confusion. The patient is not nervous/anxious.      Objective:     Vital Signs (Most Recent):  Temp: 98.1 °F (36.7 °C) (10/20/18 1203)  Pulse: 105 (10/20/18 1600)  Resp: 19 (10/20/18 1203)  BP: 92/68 (10/20/18 1707)  SpO2: 98 % (10/20/18 1727) Vital Signs (24h Range):  Temp:  [98.1 °F (36.7 °C)-99.8 °F (37.7 °C)] 98.1 °F (36.7 °C)  Pulse:  [] 105  Resp:  [16-22] 19  SpO2:  [94 %-98 %] 98 %  BP: ()/(61-84) 92/68     Weight: 108 kg (238 lb 3.3 oz)  Body mass index is 34.18 kg/m².    Intake/Output Summary (Last 24 hours) at 10/20/2018 1816  Last data filed at 10/20/2018 1230  Gross per 24 hour   Intake 1099.45 ml   Output 1075 ml   Net 24.45 ml      Physical Exam   Constitutional: He is oriented to person, place, and time. He appears well-developed and well-nourished. No distress.   HENT:   Head: Normocephalic and atraumatic.   Right  Ear: External ear normal.   Left Ear: External ear normal.   Nose: Nose normal.   Mouth/Throat: Oropharynx is clear and moist.   Eyes: Conjunctivae and EOM are normal. No scleral icterus.   Neck: Normal range of motion. Neck supple. Hepatojugular reflux and JVD (pulsatile d/t tR jet) present. No tracheal deviation present. No thyromegaly present.   Cardiovascular: Normal rate, regular rhythm and intact distal pulses. Exam reveals no gallop and no friction rub.   Murmur (high pitched blowing III/VI mitral area) heard.  Pulmonary/Chest: Effort normal and breath sounds normal. No respiratory distress. He has no wheezes. He has no rales. He exhibits tenderness (d/t recent fall, reproducible with mild palpation).   Abdominal: Soft. Bowel sounds are normal. He exhibits distension. He exhibits no mass. There is no tenderness. There is no rebound and no guarding.   Musculoskeletal: Normal range of motion. He exhibits no edema or tenderness.   Neurological: He is alert and oriented to person, place, and time. No cranial nerve deficit or sensory deficit. He exhibits normal muscle tone. Coordination abnormal.   Skin: Skin is warm and dry. No rash noted. No erythema.   Psychiatric: He has a normal mood and affect. His behavior is normal. Judgment and thought content normal.   Nursing note and vitals reviewed.      Significant Labs:   CBC:   Recent Labs   Lab 10/19/18  2113 10/20/18  0404   WBC 4.79 4.65   HGB 11.3* 10.4*   HCT 35.4* 32.8*    153     CMP:   Recent Labs   Lab 10/19/18  1358 10/20/18  0404    136   K 4.2 3.8   CL 98 96   CO2 27 29   GLU 86 91   BUN 39* 33*   CREATININE 1.1 1.1   CALCIUM 10.0 9.3   PROT 6.9  --    ALBUMIN 3.3*  --    BILITOT 2.0*  --    ALKPHOS 133  --    AST 32  --    ALT 25  --    ANIONGAP 12 11   EGFRNONAA >60.0 >60.0     Cardiac Markers:   Recent Labs   Lab 10/19/18  1358   *     Coagulation:   Recent Labs   Lab 10/19/18  2113  10/20/18  1607   INR 1.4*  --   --    APTT 27.1    < > 27.4    < > = values in this interval not displayed.       Significant Imaging: I have reviewed all pertinent imaging results/findings within the past 24 hours.    Assessment/Plan:      * Acute on chronic systolic heart failure    - , physical exam findings c/w acute heart failure exacerbation, prior EF 35%, echo pending, T bili elevated 2.0  - start Iv lasix 80mg bid, as he is having tachypnea and elevated JVD/ HJR  - monitor weights daily  - monitor strict I/o's  - telemetry   - d/t acute hypotension and recent stroke vs seizure episode 10/19 holding all b/p meds ie. entresto (last fill several months ago), hydralazine, imdur, Toprol   - noncompliant with meds,   - + ETOH/ smoker abuse / Lives alone/ eats out / cooks with salt educated on all accounts  - awaiting CTS to discuss mitral valvular replacement  - new onset Atrial flutter which may also be exacerbating       Stroke vs seizure    - stroke code called, head ct negative, aphasic and L sided weakness  - [facility] neuro concerned it also may have been a seizure, eeg underway,   - planning mri for Monday as they only do device mri's M-F with rep present.        Prosthetic mitral valve stenosis bioprosthetic 2014    - Referred to CT to evaluate restenosis of bioprosthetic valve  - CT surgery consulted   - now with atrial flutter which is valvular induced no longer candidate for xarelto, bridge heparin / warfarin once plan is delineated.      Biventricular ICD (implantable cardioverter-defibrillator) in place    - h/o BIV ICD extract implant 2/18, case complicated by lead retention  - EP consulted, h/o VT/VF, no amiodarone filled since May 2018       AICD lead malfunction    - no lead malfunction, interrogation impedance is fine, no lead issues       Ventricular tachycardia, monomorphic    - h/o one episode that landed in VF zone, terminated after one round of ATP  - Discuss with EP about restarting amiodarone and or need to get NITA first as he has also  possible 2:1 flutter and risk of chemical cardioversion        Hx of CABG 2014    See above        Coronary artery disease involving native coronary artery of native heart without angina pectoris    - h/o CABG 2014 with bioprosthetic valve, no angina, recent cp d/t trauma from fall during black out episode while drunk. Concern for cardiac contusion.    Echo no evidence of contusion  -  1 - Severely depressed left ventricular systolic function (EF 20-25%).     2 - Concentric remodeling.     3 - Biatrial enlargement.     4 - Low normal to mildly depressed right ventricular systolic function .     5 - Mitral valve prosthesis.     6 - Moderate tricuspid regurgitation.     7 - Increased central venous pressure.     8 - Pulmonary hypertension. The estimated PA systolic pressure is 52 mmHg.      Benign essential HTN    - patient actually wasn't taking three of his meds, he cannot remember which three  - now hypotensive from entresto, BB, imdur, hydralazine will hold since recent aphasia episode vs stroke vs seizure       Chronic anticoagulation    - No longer a xarelto candidate as he has valvular afib.  Heparin on board ossible upcoming surgical procedures  - will need bridge to warfarin and set up with coumadin clinic in [place], may need to transfer back to [facility]         Atrial flutter    - heparin gtt  - d/t stroke code/ sz r/o, currently hypotensive, heparin gtt and holding b/p meds for perfusion sake  - NITA/ DCCV Monday if still in it       HLD (hyperlipidemia)    - atorva 80  -   Lab Results   Component Value Date    CHOL 98 (L) 10/20/2018     Lab Results   Component Value Date    HDL 25 (L) 10/20/2018     Lab Results   Component Value Date    LDLCALC 60.2 (L) 10/20/2018     Lab Results   Component Value Date    TRIG 64 10/20/2018     Lab Results   Component Value Date    CHOLHDL 25.5 10/20/2018            VTE Risk Mitigation (From admission, onward)        Ordered     heparin 25,000 units in dextrose 5% 250  mL (100 units/mL) infusion LOW INTENSITY nomogram - [facility]  Continuous      10/19/18 1925     heparin 25,000 units in dextrose 5% (100 units/ml) IV bolus from bag - ADDITIONAL PRN BOLUS - 60 units/kg  As needed (PRN)      10/19/18 1925     heparin 25,000 units in dextrose 5% (100 units/ml) IV bolus from bag - ADDITIONAL PRN BOLUS - 30 units/kg  As needed (PRN)      10/19/18 1925              Chaya Flores NP  Department of Hospital Medicine   [facility]

## 2018-10-20 NOTE — ASSESSMENT & PLAN NOTE
- Referred to CT to evaluate restenosis of bioprosthetic valve  - CT surgery consulted   - now with atrial flutter which is valvular induced no longer candidate for xarelto, bridge heparin / warfarin once plan is delineated.

## 2018-10-20 NOTE — PROGRESS NOTES
10/20/18 0952   Vital Signs   BP 98/61   MAP (mmHg) 76   Patient reporting lightheadedness and dizziness. Alba BRISCOE notified, adjusted medications and patient got back in bed with legs elevated.

## 2018-10-20 NOTE — PROGRESS NOTES
Patient complaining of 10/10 chest pain. Upon entering the room, patient complained of something sitting on his chest. STAT EKG ordered, Pressure attained, 92/68, Spoke with Alba BRISCOE about giving nitro and she stated to hold off due to patient not being able to tolerate pressure drop, stat troponins ordered and GI cocktail and morphine ordered. Will continue to monitor.

## 2018-10-20 NOTE — CONSULTS
[facility]  Vascular Neurology  Comprehensive Stroke Center  Consult Note    Consults  Assessment/Plan:     Patient is a 63 y.o. year old male with:    * Acute on chronic systolic heart failure    EF 20-25 on echo today   Patient with aicd   Admitted to cards/med team      Altered mental status    With associated left sided weakness/blank stare/minimally responsive temporarily and urinary incontinence as documented by nursing staff, improved mentation within one hours time.   The patient with no obvious hemorrhage on CT scan, pending formal read by radiology   No exam findings to suggest LVO    If CT head read as normal - would recommend restarting anticoagulation (if ok from surgical standpoint - can also consider hep drip until just prior to procedure if CTS is amenable). IF not able to anticoagulate would recommend antiplatelet and hep vte.   Can repeat head CT in 1-2 days if concern for stroke persists.     Also recommend EEG for rule out seizure activity (no noted history)    Unable to have MRI due to AICD   Echo reviewed from today     TIA and stroke still within the differential however management would be to resume anticoagulation and proceed with secondary stroke prevention.   Would benefit from PT/OT speech eval   Goal BP - normotensive    Discussed with staff.        Atrial flutter    Stroke risk factor  Previously anticoagulated   has aicd      Ventricular tachycardia, monomorphic    Noted to have various arrhythmias while inpatient today   Seen by EP   See note regarding device interrogation      Chronic anticoagulation    Takes xarelto at home - last dose 10/18 - 21:36  At outside hospital   recevied hep vte ppx today at approximately 5 pm   Anticoagulation held for potential surgical intervention   Being treated for afib and dvt      HLD (hyperlipidemia)    Stroke risk factor  Atorvastatin 40 if LDL > 70 and no other contraindications      Benign essential HTN    Stroke risk factor    Recommend normotensive          STROKE DOCUMENTATION     Acute Stroke Times   Last Known Normal Date: 10/19/18  Last Known Normal Time: 1755  Symptom Onset Date: 10/19/18  Symptom Onset Time: 1755  Stroke Team Called Date: 10/19/18  Stroke Team Called Time: 1807  Stroke Team Arrival Date: 10/19/18  Stroke Team Arrival Time: 1809  CT Interpretation Time: 1845    NIH Scale:  1a. Level Of Consciousness: 0-->Alert: keenly responsive  1b. LOC Questions: 2-->Answers neither question correctly  1c. LOC Commands: 0-->Performs both tasks correctly  2. Best Gaze: 0-->Normal  3. Visual: 0-->No visual loss  4. Facial Palsy: 0-->Normal symmetrical movements  5a. Motor Arm, Left: 0-->No drift: limb holds 90 (or 45) degrees for full 10 secs  5b. Motor Arm, Right: 0-->No drift: limb holds 90 (or 45) degrees for full 10 secs  6a. Motor Leg, Left: 1-->Drift: leg falls by the end of the 5-sec period but does not hit bed  6b. Motor Leg, Right: 0-->No drift: leg holds 30 degree position for full 5 secs  7. Limb Ataxia: 0-->Absent  8. Sensory: 0-->Normal: no sensory loss  9. Best Language: 0-->No aphasia: normal  10. Dysarthria: 0-->Normal  11. Extinction and Inattention (formerly Neglect): 0-->No abnormality  Total (NIH Stroke Scale): 3    Modified Ray Score: 3  [place] Coma Scale:14   ABCD2 Score:    [id number] Score:   HAS -BLED Score:   ICH Score:   Hunt & Pichardo Classification:       Thrombolysis Candidate? No, Current use of direct thrombin inhibitors (dabigatran) or direct factor Xa inhibitors (rivaroxaban, apixaban, edoxaban) with elevated sensitive laboratory tests , strong suspicion for alternative diagnosis       Interventional Revascularization Candidate?   Is the patient eligible for mechanical endovascular reperfusion (KERRY)?  No; No large vessel occlusion      Hemorrhagic change of an Ischemic Stroke: Does this patient have an ischemic stroke with hemorrhagic changes? No     Subjective:     History of Present  Illness:  63 year old male presented initially to an outside hospital for evaluation of dyspnea, later transferred here on 10/19/18 for evaluation as a potential candidate for mitral valve replacement. The patient with history significant for afib/flutter, vfib, mitral valve replacement, CHF dilated cardiomyopathy, HTN, HLD, AICD (2/2018), pulm htn, tobacco and daily alcohol use, CAD, and DVT (L) who is anticoagulated on xarelto - last dose given on 10/18/18 21:36 (documented from outside record med admin).     At approximately 5:55 pm today the patient was found to be less conversive, and less responsive with a blank stare, urinary incontinence.   The patients team called a stroke code, first RN to come to bedside as a part of the rapid response team noted him to have some drift in the LUE /LLE. During the course of the assessment, the patient became more responsive and interactive.   Patient denies headache, stomach pain, vision changes, speech changes, weakness.   At baseline, the patient ambulates with a walker.     The patient was taken to CT. Case discussed with primary team. Outside records reviewed.         History reviewed. No pertinent past medical history.  Past Surgical History:   Procedure Laterality Date    EXTRACTION-LEAD N/A 2/12/2018    Performed by Jose Fitzgerald MD at [facility] OR 2ND FLR    EXTRACTION-LEAD N/A 2/12/2018    Performed by Jose Fitzgerald MD at [facility] CATH LAB    INSERTION-ICD-BIVENTRICULAR N/A 2/12/2018    Performed by Jose Fitzgerald MD at [facility] CATH LAB     History reviewed. No pertinent family history.  Social History     Tobacco Use    Smoking status: Current Every Day Smoker     Packs/day: 0.25     Years: 50.00     Pack years: 12.50    Smokeless tobacco: Current User     Types: Snuff   Substance Use Topics    Alcohol use: No    Drug use: No     Review of patient's allergies indicates:  No Known Allergies    Medications: I have reviewed the current medication  administration record.    Medications Prior to Admission   Medication Sig Dispense Refill Last Dose    albuterol (PROAIR HFA) 90 mcg/actuation inhaler Inhale 2 puffs into the lungs every 6 (six) hours as needed for Wheezing. Rescue   10/18/2018 at 2100                                                                     atorvastatin (LIPITOR) 40 MG tablet Take 40 mg by mouth once daily.   10/18/2018 at 2100    budesonide-formoterol 160-4.5 mcg (SYMBICORT) 160-4.5 mcg/actuation HFAA Inhale 2 puffs into the lungs every 12 (twelve) hours. Controller   10/18/2018 at Unknown time    furosemide (LASIX) 40 MG tablet Take 1 tablet (40 mg total) by mouth 2 (two) times daily. (Patient taking differently: Take 80 mg by mouth 2 (two) times daily. ) 60 tablet 3 10/18/2018 at Unknown time    hydrALAZINE (APRESOLINE) 25 MG tablet Take 1 tablet (25 mg total) by mouth 3 (three) times daily. Do not take this medicine until you see your PCP in 1 week   10/18/2018 at Unknown time    isosorbide mononitrate (IMDUR) 30 MG 24 hr tablet Take 1 tablet (30 mg total) by mouth once daily. 30 tablet 3 10/18/2018 at Unknown time    magnesium oxide (MAG-OX) 400 mg (241.3 mg magnesium) tablet Take 400 mg by mouth once daily.   10/18/2018 at Unknown time    metOLazone (ZAROXOLYN) 5 MG tablet Take 5 mg by mouth as needed. Twice weekly as needed       metoprolol succinate (TOPROL-XL) 25 MG 24 hr tablet Take 12.5 mg by mouth once daily.   10/18/2018 at Unknown time    potassium chloride (KLOR-CON) 10 MEQ TbSR Take 20 mEq by mouth 3 (three) times daily. 2  Caps tid   10/18/2018 at Unknown time    ranolazine (RANEXA) 500 MG Tb12 Take 500 mg by mouth 2 (two) times daily.   10/18/2018 at Unknown time    rivaroxaban (XARELTO) 20 mg Tab Take 1 tablet (20 mg total) by mouth once daily. Do not take this medication for 5 days after procedure (restart on 2/18/2018) (Patient taking differently: Take 20 mg by mouth once daily. )   10/18/2018 at Unknown  time    sacubitril-valsartan (ENTRESTO) 49-51 mg per tablet Take 1 tablet by mouth 2 (two) times daily.   10/18/2018 at 2100    acetaminophen (TYLENOL) 325 MG tablet Take 2 tablets (650 mg total) by mouth every 4 (four) hours as needed.  0 Unknown at Unknown time    amiodarone (PACERONE) 200 MG Tab Take 1 tablet (200 mg total) by mouth 2 (two) times daily. Take 200mg twice a day until seen by Dr. Fitzgerald in clinic (Patient taking differently: Take 200 mg by mouth once daily. Take 200mg a day at home not listed in transfer med list, though he said he took it yesterday) 60 tablet 3 Unknown at Unknown time    nitroGLYCERIN (NITROSTAT) 0.4 MG SL tablet Place 0.4 mg under the tongue every 5 (five) minutes as needed for Chest pain.   Unknown at Unknown time       Review of Systems   Constitutional: Negative for fatigue and fever.   HENT: Negative for drooling.    Eyes: Negative for visual disturbance.   Respiratory: Negative for stridor.    Cardiovascular: Positive for palpitations.   Gastrointestinal: Negative for abdominal pain.   Genitourinary:        (+) documented urinary incontinence   Skin: Negative for pallor.   Allergic/Immunologic: Negative for immunocompromised state.   Neurological: Positive for speech difficulty and weakness. Negative for headaches.   Hematological:        (+) on xarelto - DVT/afib   Psychiatric/Behavioral: Positive for confusion.     Objective:     Vital Signs (Most Recent):  Temp: 99.1 °F (37.3 °C) (10/19/18 1534)  Pulse: (!) 130 (10/19/18 1824)  Resp: (!) 22 (10/19/18 1823)  BP: 125/74 (10/19/18 1824)  SpO2: (!) 94 % (10/19/18 1824)    Vital Signs Range (Last 24H):  Temp:  [97.7 °F (36.5 °C)-99.1 °F (37.3 °C)]   Pulse:  []   Resp:  [18-22]   BP: (113-131)/(74-86)   SpO2:  [93 %-94 %]     Physical Exam   Constitutional: He appears well-developed and well-nourished.   HENT:   Head: Normocephalic and atraumatic.   Eyes: EOM are normal.   Cardiovascular:   Rate variable - up to 120  as low as 70    Pulmonary/Chest: Effort normal.   Abdominal: Soft.   Musculoskeletal: Normal range of motion.   Neurological: He is alert.   Skin: Skin is warm and dry.   Psychiatric:   Disoriented initially, slow to respond   Nursing note and vitals reviewed.      Neurological Exam:   LOC: alert  Attention Span: poor  Language: No aphasia  Articulation: No dysarthria  Orientation: oriented to person and time - age and month correct  Visual Fields: Full  EOM (CN III, IV, VI): Full/intact  Pupils (CN II, III): PERRL  Facial Movement (CN VII): Symmetric facial expression    Gag Reflex: present  Motor: Arm left  Normal 5/5  Leg left  Paresis: 3/5  Arm right  Normal 5/5  Leg right Paresis: 4/5  Cebellar: No evidence of appendicular or axial ataxia  Sensation: Intact to light touch, temperature and vibration  Tone: Normal tone throughout      Laboratory:  CMP:   Recent Labs   Lab 10/19/18  1358   CALCIUM 10.0   ALBUMIN 3.3*   PROT 6.9      K 4.2   CO2 27   CL 98   BUN 39*   CREATININE 1.1   ALKPHOS 133   ALT 25   AST 32   BILITOT 2.0*     CBC: No results for input(s): WBC, RBC, HGB, HCT, PLT, MCV, MCH, MCHC in the last 168 hours.  Lipid Panel: No results for input(s): CHOL, LDLCALC, HDL, TRIG in the last 168 hours.  Coagulation: No results for input(s): PT, INR, APTT in the last 168 hours.  Hgb A1C:   Recent Labs   Lab 10/19/18  1358   HGBA1C 4.9     TSH:   Recent Labs   Lab 10/19/18  1358   TSH 0.550       Diagnostic Results:      Brain imaging:  CTA head and neck pending read  No obvious hemorrhage when reviewed during scanning     2d echo -10/19/18  Moderately enlarged LA    1 - Severely depressed left ventricular systolic function (EF 20-25%).     2 - Concentric remodeling.     3 - Biatrial enlargement.     4 - Low normal to mildly depressed right ventricular systolic function .     5 - Mitral valve prosthesis.     6 - Moderate tricuspid regurgitation.     7 - Increased central venous pressure.     8 -  Pulmonary hypertension. The estimated PA systolic pressure is 52 mmHg.         CASANDRA Lopez  Comprehensive Stroke Center  Department of Vascular Neurology   [facility]

## 2018-10-20 NOTE — SUBJECTIVE & OBJECTIVE
Interval History: Had a stroke code called last night. Neurology unsure if stroke or seizure, CTA negative so far. Patient improved with no motor deficits at this time    Tele: A-sensed and V-paced, hr 100-110    ROS  Objective:     Vital Signs (Most Recent):  Temp: 98.1 °F (36.7 °C) (10/20/18 1203)  Pulse: (!) 117 (10/20/18 1203)  Resp: 19 (10/20/18 1203)  BP: 114/64 (10/20/18 1203)  SpO2: 95 % (10/20/18 1203) Vital Signs (24h Range):  Temp:  [98.1 °F (36.7 °C)-99.8 °F (37.7 °C)] 98.1 °F (36.7 °C)  Pulse:  [] 117  Resp:  [16-22] 19  SpO2:  [93 %-98 %] 95 %  BP: ()/(61-84) 114/64     Weight: 108 kg (238 lb 3.3 oz)  Body mass index is 34.18 kg/m².     SpO2: 95 %  O2 Device (Oxygen Therapy): nasal cannula    Physical Exam   Constitutional: He appears well-developed and well-nourished. No distress.   Lethargic in AM, improved in afternoon   HENT:   Head: Normocephalic and atraumatic.   Mouth/Throat: Oropharynx is clear and moist.   Eyes: Conjunctivae and EOM are normal. Pupils are equal, round, and reactive to light. No scleral icterus.   Neck: Normal range of motion. Neck supple. No JVD present.   Cardiovascular: Regular rhythm, normal heart sounds and intact distal pulses. Tachycardia present.   No murmur heard.  Pulses:       Radial pulses are 2+ on the right side, and 2+ on the left side.   Pulmonary/Chest: Effort normal and breath sounds normal. No respiratory distress.   Symmetrical expansion   Abdominal: Soft. Bowel sounds are normal. There is no hepatosplenomegaly. There is no tenderness.   Musculoskeletal: Normal range of motion. He exhibits no edema.   Neurological: He is alert.   Moves all extremities   Skin: Skin is warm and dry. No rash noted. He is not diaphoretic.       Significant Labs:   EP:   Recent Labs   Lab 10/19/18  1358  10/19/18  2113 10/20/18  0404     --   --  136   K 4.2  --   --  3.8   CL 98  --   --  96   CO2 27  --   --  29   GLU 86  --   --  91   BUN 39*  --   --  33*    CREATININE 1.1  --   --  1.1   CALCIUM 10.0  --   --  9.3   PROT 6.9  --   --   --    ALBUMIN 3.3*  --   --   --    BILITOT 2.0*  --   --   --    ALKPHOS 133  --   --   --    AST 32  --   --   --    ALT 25  --   --   --    ANIONGAP 12  --   --  11   ESTGFRAFRICA >60.0  --   --  >60.0   EGFRNONAA >60.0  --   --  >60.0   WBC  --   --  4.79 4.65   HGB  --   --  11.3* 10.4*   HCT  --    < > 35.4* 32.8*   PLT  --   --  177 153   INR  --   --  1.4*  --     < > = values in this interval not displayed.       Significant Imaging:   CTA brain -- no LVO, vertebral stenosis noted

## 2018-10-20 NOTE — ASSESSMENT & PLAN NOTE
- , physical exam findings c/w acute heart failure exacerbation, prior EF 35%, echo pending, T bili elevated 2.0  - start Iv lasix 80mg bid, as he is having tachypnea and elevated JVD/ HJR  - monitor weights daily  - monitor strict I/o's  - telemetry   - d/t acute hypotension and recent stroke vs seizure episode 10/19 holding all b/p meds ie. entresto (last fill several months ago), hydralazine, imdur, Toprol   - noncompliant with meds,   - + ETOH/ smoker abuse / Lives alone/ eats out / cooks with salt educated on all accounts  - awaiting CTS to discuss mitral valvular replacement  - new onset Atrial flutter which may also be exacerbating

## 2018-10-20 NOTE — ASSESSMENT & PLAN NOTE
- atorva 80  -   Lab Results   Component Value Date    CHOL 98 (L) 10/20/2018     Lab Results   Component Value Date    HDL 25 (L) 10/20/2018     Lab Results   Component Value Date    LDLCALC 60.2 (L) 10/20/2018     Lab Results   Component Value Date    TRIG 64 10/20/2018     Lab Results   Component Value Date    CHOLHDL 25.5 10/20/2018

## 2018-10-20 NOTE — SUBJECTIVE & OBJECTIVE
Interval History:  EEG monitoring underway as he had a stroke code vs seizure episode yesterday evening head ct negative, today c/o 10/10 cp -> reproducible given morphine for pain and then will give toradol if persists.  Otherwise,     Review of Systems   Constitutional: Positive for activity change and fatigue. Negative for appetite change, chills and fever.   HENT: Negative for congestion and trouble swallowing.    Eyes: Negative for visual disturbance.   Respiratory: Positive for cough, chest tightness and shortness of breath. Negative for wheezing.    Cardiovascular: Positive for chest pain (patient also recent fall onto sternum d/t black episode while drinking). Negative for palpitations and leg swelling.   Gastrointestinal: Positive for abdominal distention and abdominal pain. Negative for constipation, nausea and vomiting.   Genitourinary: Negative for frequency and hematuria.   Musculoskeletal: Positive for arthralgias, back pain and gait problem. Negative for myalgias and neck pain.        Uses rolling walker to mobilize around     Skin: Negative for color change, pallor and rash.        Sternal notch hematoma    Neurological: Positive for dizziness, weakness and light-headedness. Negative for syncope, numbness and headaches.   Hematological: Bruises/bleeds easily.   Psychiatric/Behavioral: Negative for agitation and confusion. The patient is not nervous/anxious.      Objective:     Vital Signs (Most Recent):  Temp: 98.1 °F (36.7 °C) (10/20/18 1203)  Pulse: 105 (10/20/18 1600)  Resp: 19 (10/20/18 1203)  BP: 92/68 (10/20/18 1707)  SpO2: 98 % (10/20/18 1727) Vital Signs (24h Range):  Temp:  [98.1 °F (36.7 °C)-99.8 °F (37.7 °C)] 98.1 °F (36.7 °C)  Pulse:  [] 105  Resp:  [16-22] 19  SpO2:  [94 %-98 %] 98 %  BP: ()/(61-84) 92/68     Weight: 108 kg (238 lb 3.3 oz)  Body mass index is 34.18 kg/m².    Intake/Output Summary (Last 24 hours) at 10/20/2018 2226  Last data filed at 10/20/2018 1230  Gross  per 24 hour   Intake 1099.45 ml   Output 1075 ml   Net 24.45 ml      Physical Exam   Constitutional: He is oriented to person, place, and time. He appears well-developed and well-nourished. No distress.   HENT:   Head: Normocephalic and atraumatic.   Right Ear: External ear normal.   Left Ear: External ear normal.   Nose: Nose normal.   Mouth/Throat: Oropharynx is clear and moist.   Eyes: Conjunctivae and EOM are normal. No scleral icterus.   Neck: Normal range of motion. Neck supple. Hepatojugular reflux and JVD (pulsatile d/t tR jet) present. No tracheal deviation present. No thyromegaly present.   Cardiovascular: Normal rate, regular rhythm and intact distal pulses. Exam reveals no gallop and no friction rub.   Murmur (high pitched blowing III/VI mitral area) heard.  Pulmonary/Chest: Effort normal and breath sounds normal. No respiratory distress. He has no wheezes. He has no rales. He exhibits tenderness (d/t recent fall, reproducible with mild palpation).   Abdominal: Soft. Bowel sounds are normal. He exhibits distension. He exhibits no mass. There is no tenderness. There is no rebound and no guarding.   Musculoskeletal: Normal range of motion. He exhibits no edema or tenderness.   Neurological: He is alert and oriented to person, place, and time. No cranial nerve deficit or sensory deficit. He exhibits normal muscle tone. Coordination abnormal.   Skin: Skin is warm and dry. No rash noted. No erythema.   Psychiatric: He has a normal mood and affect. His behavior is normal. Judgment and thought content normal.   Nursing note and vitals reviewed.      Significant Labs:   CBC:   Recent Labs   Lab 10/19/18  2113 10/20/18  0404   WBC 4.79 4.65   HGB 11.3* 10.4*   HCT 35.4* 32.8*    153     CMP:   Recent Labs   Lab 10/19/18  1358 10/20/18  0404    136   K 4.2 3.8   CL 98 96   CO2 27 29   GLU 86 91   BUN 39* 33*   CREATININE 1.1 1.1   CALCIUM 10.0 9.3   PROT 6.9  --    ALBUMIN 3.3*  --    BILITOT 2.0*  --     ALKPHOS 133  --    AST 32  --    ALT 25  --    ANIONGAP 12 11   EGFRNONAA >60.0 >60.0     Cardiac Markers:   Recent Labs   Lab 10/19/18  1358   *     Coagulation:   Recent Labs   Lab 10/19/18  2113  10/20/18  1607   INR 1.4*  --   --    APTT 27.1   < > 27.4    < > = values in this interval not displayed.       Significant Imaging: I have reviewed all pertinent imaging results/findings within the past 24 hours.

## 2018-10-20 NOTE — PROGRESS NOTES
Spoke with Charge Nurse regarding EEG. Charge Nurse was able to contact someone regarding EEG and found out that MD who reads study delegates who techs go to and set up the device on. Patient is next on the list but unable to know if the test will be done today or tomorrow but they were made aware that if it is done tomorrow that will be 2 days out from event that caused order. Will continue to monitor.

## 2018-10-20 NOTE — ASSESSMENT & PLAN NOTE
With associated left sided weakness/blank stare/minimally responsive temporarily and urinary incontinence as documented by nursing staff, improved mentation within one hours time.   The patient with no obvious hemorrhage on CT scan, pending formal read by radiology   No exam findings to suggest LVO    If CT head read as normal - would recommend restarting anticoagulation (if ok from surgical standpoint - can also consider hep drip until just prior to procedure if CTS is amenable). IF not able to anticoagulate would recommend antiplatelet and hep vte.   Also recommend EEG for rule out seizure activity (no noted history)  Unable to have MRI due to AICD   Echo reviewed from today     TIA and stroke still within the differential however management would be to resume anticoagulation and proceed with secondary stroke prevention.   Would benefit from PT/OT speech eval   Goal BP - normotensive    Discussed with staff.

## 2018-10-20 NOTE — ASSESSMENT & PLAN NOTE
- No longer a xarelto candidate as he has valvular afib.  Heparin on board ossible upcoming surgical procedures  - will need bridge to warfarin and set up with coumadin clinic in [place], may need to transfer back to [facility]

## 2018-10-20 NOTE — ASSESSMENT & PLAN NOTE
Noted to have various arrhythmias while inpatient today   Seen by EP   See note regarding device interrogation

## 2018-10-20 NOTE — ASSESSMENT & PLAN NOTE
- heparin gtt  - d/t stroke code/ sz r/o, currently hypotensive, heparin gtt and holding b/p meds for perfusion sake  - NITA/ DCCV Monday if still in it

## 2018-10-20 NOTE — ASSESSMENT & PLAN NOTE
- h/o one episode that landed in VF zone, terminated after one round of ATP  - Discuss with EP about restarting amiodarone and or need to get NITA first as he has also possible 2:1 flutter and risk of chemical cardioversion

## 2018-10-20 NOTE — PLAN OF CARE
Problem: Patient Care Overview  Goal: Plan of Care Review  Outcome: Ongoing (interventions implemented as appropriate)  Pt free of falls/trauma/injuries.  Denies c/o SOB, CP, or discomfort.  Generalized skin remains CDI; generalized edema noted.  Pt being diuresed with lasix; diuresing well.   Wt decreased since yesterday. Pt had a stroke code on the previous shift; CT of the head done and results are negative. 2D echo done; EF of 23%. Possible EEG monitoring. Started on a Hep gtt at 12 units an hour. Fall bundle in place. POC explained,  Pt tolerating plan of care.

## 2018-10-20 NOTE — PLAN OF CARE
"Problem: Patient Care Overview  Goal: Plan of Care Review  Outcome: Ongoing (interventions implemented as appropriate)  Plan of care discussed with patient. Patient is free of fall/trauma/injury. Denies CP, SOB, or pain/discomfort. Patient complained of 10/10 chest pain and stated "felt like someone is sitting on my chest" , appeared to be muscle pain opposed to chest pain. Morphine given and pain subsided. EEG currently being preformed. Patient able to ambulate to and from bathroom with assistance. All questions addressed. Will continue to monitor      "

## 2018-10-20 NOTE — PROCEDURES
[facility]     PRELIMINARY C-EEG REPORT:  Review: 10/20/18, 16:50 (start) - 17:29     Symmetric, generalized theta-alpha (7-8 Hz) background with posterior dominant rhythm of 8 Hz - mild generalized slowing, suggestive of mild diffuse or multifocal cerebral dysfunction.  No epileptiform activity or electrographic seizures seen.  Irregular heart rate noted on EKG.    Full report to follow.  Will continue to monitor.     Thank you for involving us in the care of this patient.    Candi Martinez MD, HAWA(), FACNS, FAMAXIMINO.  Neurology-Epilepsy.  [facility].
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
## 2018-10-20 NOTE — NURSING
Time Stroke Code initiated: 1807  Stroke team Arrival time: 1810  Stroke Code activation triggers: Aphasia   Vascular Neurologist you spoke with: CASANDRA Klein    Arrived at CT: 1830  CT completed: 1850    tPA decision: No  tPA bolus (mg and time):  tPA infusion (mg and time):  tPA end time:    IR decision: No  IR arrival:  IR end time:     Pt transferred to: 3096. Plan for EEG.   Report given to: Steve Hinds RN

## 2018-10-20 NOTE — PROGRESS NOTES
[facility]  Cardiac Electrophysiology  Progress Note    Admission Date: 10/19/2018  Code Status: Full Code   Attending Physician: Justin Robles MD   Expected Discharge Date: 10/26/2018  Principal Problem:Acute on chronic systolic heart failure    Subjective:     Interval History: Had a stroke code called last night. Neurology unsure if stroke or seizure, CTA negative so far. Patient improved with no motor deficits at this time    Tele: A-sensed and V-paced, hr 100-110    ROS  Objective:     Vital Signs (Most Recent):  Temp: 98.1 °F (36.7 °C) (10/20/18 1203)  Pulse: (!) 117 (10/20/18 1203)  Resp: 19 (10/20/18 1203)  BP: 114/64 (10/20/18 1203)  SpO2: 95 % (10/20/18 1203) Vital Signs (24h Range):  Temp:  [98.1 °F (36.7 °C)-99.8 °F (37.7 °C)] 98.1 °F (36.7 °C)  Pulse:  [] 117  Resp:  [16-22] 19  SpO2:  [93 %-98 %] 95 %  BP: ()/(61-84) 114/64     Weight: 108 kg (238 lb 3.3 oz)  Body mass index is 34.18 kg/m².     SpO2: 95 %  O2 Device (Oxygen Therapy): nasal cannula    Physical Exam   Constitutional: He appears well-developed and well-nourished. No distress.   Lethargic in AM, improved in afternoon   HENT:   Head: Normocephalic and atraumatic.   Mouth/Throat: Oropharynx is clear and moist.   Eyes: Conjunctivae and EOM are normal. Pupils are equal, round, and reactive to light. No scleral icterus.   Neck: Normal range of motion. Neck supple. No JVD present.   Cardiovascular: Regular rhythm, normal heart sounds and intact distal pulses. Tachycardia present.   No murmur heard.  Pulses:       Radial pulses are 2+ on the right side, and 2+ on the left side.   Pulmonary/Chest: Effort normal and breath sounds normal. No respiratory distress.   Symmetrical expansion   Abdominal: Soft. Bowel sounds are normal. There is no hepatosplenomegaly. There is no tenderness.   Musculoskeletal: Normal range of motion. He exhibits no edema.   Neurological: He is alert.   Moves all extremities   Skin: Skin  is warm and dry. No rash noted. He is not diaphoretic.       Significant Labs:   EP:   Recent Labs   Lab 10/19/18  1358  10/19/18  2113 10/20/18  0404     --   --  136   K 4.2  --   --  3.8   CL 98  --   --  96   CO2 27  --   --  29   GLU 86  --   --  91   BUN 39*  --   --  33*   CREATININE 1.1  --   --  1.1   CALCIUM 10.0  --   --  9.3   PROT 6.9  --   --   --    ALBUMIN 3.3*  --   --   --    BILITOT 2.0*  --   --   --    ALKPHOS 133  --   --   --    AST 32  --   --   --    ALT 25  --   --   --    ANIONGAP 12  --   --  11   ESTGFRAFRICA >60.0  --   --  >60.0   EGFRNONAA >60.0  --   --  >60.0   WBC  --   --  4.79 4.65   HGB  --   --  11.3* 10.4*   HCT  --    < > 35.4* 32.8*   PLT  --   --  177 153   INR  --   --  1.4*  --     < > = values in this interval not displayed.       Significant Imaging:   CTA brain -- no LVO, vertebral stenosis noted    Assessment and Plan:     Atrial flutter    Pt here with possible severe mitral stenosis and possible MVR. Today on interrogation of device noted to be in atrial flutter. He has apparently not taken his medications in months.     - continue heparin gtt  - will consider NITA/DCCV on Monday if he remains in flutter     Biventricular ICD (implantable cardioverter-defibrillator) in place    ICD is a AdTapsyronik Iperia 7 HF-T. S/N: [id number] (PID: [id number])         John Paul Isabel MD  Cardiac Electrophysiology  [facility]

## 2018-10-20 NOTE — ASSESSMENT & PLAN NOTE
- stroke code called, head ct negative, aphasic and L sided weakness  - [facility] neuro concerned it also may have been a seizure, eeg underway,   - planning mri for Monday as they only do device mri's M-F with rep present.

## 2018-10-20 NOTE — ASSESSMENT & PLAN NOTE
Takes xarelto at home - last dose 10/18 - 21:36  At outside hospital   recevied hep vte ppx today at approximately 5 pm   Anticoagulation held for potential surgical intervention   Being treated for afib and dvt

## 2018-10-21 LAB
ANION GAP SERPL CALC-SCNC: 10 MMOL/L
APTT BLDCRRT: 59.3 SEC
APTT BLDCRRT: 59.3 SEC
APTT BLDCRRT: 68.3 SEC
BASOPHILS # BLD AUTO: 0.01 K/UL
BASOPHILS NFR BLD: 0.2 %
BILIRUB UR QL STRIP: NEGATIVE
BUN SERPL-MCNC: 28 MG/DL
CALCIUM SERPL-MCNC: 9 MG/DL
CHLORIDE SERPL-SCNC: 98 MMOL/L
CLARITY UR REFRACT.AUTO: CLEAR
CO2 SERPL-SCNC: 28 MMOL/L
COLOR UR AUTO: YELLOW
CREAT SERPL-MCNC: 1 MG/DL
DIFFERENTIAL METHOD: ABNORMAL
EOSINOPHIL # BLD AUTO: 0.1 K/UL
EOSINOPHIL NFR BLD: 1.4 %
ERYTHROCYTE [DISTWIDTH] IN BLOOD BY AUTOMATED COUNT: 16.9 %
EST. GFR  (AFRICAN AMERICAN): >60 ML/MIN/1.73 M^2
EST. GFR  (NON AFRICAN AMERICAN): >60 ML/MIN/1.73 M^2
GLUCOSE SERPL-MCNC: 104 MG/DL
GLUCOSE UR QL STRIP: NEGATIVE
HCT VFR BLD AUTO: 31.9 %
HGB BLD-MCNC: 9.9 G/DL
HGB UR QL STRIP: NEGATIVE
IMM GRANULOCYTES # BLD AUTO: 0.03 K/UL
IMM GRANULOCYTES NFR BLD AUTO: 0.7 %
KETONES UR QL STRIP: NEGATIVE
LACTATE SERPL-SCNC: 1.1 MMOL/L
LEUKOCYTE ESTERASE UR QL STRIP: NEGATIVE
LYMPHOCYTES # BLD AUTO: 1 K/UL
LYMPHOCYTES NFR BLD: 22.9 %
MAGNESIUM SERPL-MCNC: 1.8 MG/DL
MCH RBC QN AUTO: 31.1 PG
MCHC RBC AUTO-ENTMCNC: 31 G/DL
MCV RBC AUTO: 100 FL
MONOCYTES # BLD AUTO: 0.6 K/UL
MONOCYTES NFR BLD: 13.4 %
NEUTROPHILS # BLD AUTO: 2.7 K/UL
NEUTROPHILS NFR BLD: 61.4 %
NITRITE UR QL STRIP: NEGATIVE
NRBC BLD-RTO: 0 /100 WBC
PH UR STRIP: 6 [PH] (ref 5–8)
PLATELET # BLD AUTO: 132 K/UL
PMV BLD AUTO: 10.5 FL
POTASSIUM SERPL-SCNC: 3.5 MMOL/L
PROT UR QL STRIP: NEGATIVE
RBC # BLD AUTO: 3.18 M/UL
SODIUM SERPL-SCNC: 136 MMOL/L
SP GR UR STRIP: 1.01 (ref 1–1.03)
TROPONIN I SERPL DL<=0.01 NG/ML-MCNC: 0.18 NG/ML
URN SPEC COLLECT METH UR: NORMAL
UROBILINOGEN UR STRIP-ACNC: 4 EU/DL
WBC # BLD AUTO: 4.41 K/UL

## 2018-10-21 PROCEDURE — 63600175 PHARM REV CODE 636 W HCPCS: Performed by: HOSPITALIST

## 2018-10-21 PROCEDURE — S4991 NICOTINE PATCH NONLEGEND: HCPCS | Performed by: NURSE PRACTITIONER

## 2018-10-21 PROCEDURE — 4A10X4Z MONITORING OF CENTRAL NERVOUS ELECTRICAL ACTIVITY, EXTERNAL APPROACH: ICD-10-PCS | Performed by: PSYCHIATRY & NEUROLOGY

## 2018-10-21 PROCEDURE — 20600001 HC STEP DOWN PRIVATE ROOM

## 2018-10-21 PROCEDURE — 25000003 PHARM REV CODE 250: Performed by: NURSE PRACTITIONER

## 2018-10-21 PROCEDURE — 81003 URINALYSIS AUTO W/O SCOPE: CPT

## 2018-10-21 PROCEDURE — 83735 ASSAY OF MAGNESIUM: CPT

## 2018-10-21 PROCEDURE — 85730 THROMBOPLASTIN TIME PARTIAL: CPT

## 2018-10-21 PROCEDURE — 94761 N-INVAS EAR/PLS OXIMETRY MLT: CPT

## 2018-10-21 PROCEDURE — 80048 BASIC METABOLIC PNL TOTAL CA: CPT

## 2018-10-21 PROCEDURE — 85025 COMPLETE CBC W/AUTO DIFF WBC: CPT

## 2018-10-21 PROCEDURE — 93010 ELECTROCARDIOGRAM REPORT: CPT | Mod: ,,, | Performed by: INTERNAL MEDICINE

## 2018-10-21 PROCEDURE — 36415 COLL VENOUS BLD VENIPUNCTURE: CPT

## 2018-10-21 PROCEDURE — 93005 ELECTROCARDIOGRAM TRACING: CPT

## 2018-10-21 PROCEDURE — 99232 SBSQ HOSP IP/OBS MODERATE 35: CPT | Mod: ,,, | Performed by: NURSE PRACTITIONER

## 2018-10-21 PROCEDURE — 99233 SBSQ HOSP IP/OBS HIGH 50: CPT | Mod: GC,,, | Performed by: INTERNAL MEDICINE

## 2018-10-21 PROCEDURE — 82553 CREATINE MB FRACTION: CPT

## 2018-10-21 PROCEDURE — 83605 ASSAY OF LACTIC ACID: CPT

## 2018-10-21 PROCEDURE — 25000003 PHARM REV CODE 250: Performed by: HOSPITALIST

## 2018-10-21 PROCEDURE — 25000242 PHARM REV CODE 250 ALT 637 W/ HCPCS: Performed by: NURSE PRACTITIONER

## 2018-10-21 PROCEDURE — 27000221 HC OXYGEN, UP TO 24 HOURS

## 2018-10-21 PROCEDURE — 63600175 PHARM REV CODE 636 W HCPCS: Performed by: NURSE PRACTITIONER

## 2018-10-21 PROCEDURE — 84484 ASSAY OF TROPONIN QUANT: CPT

## 2018-10-21 PROCEDURE — 82550 ASSAY OF CK (CPK): CPT

## 2018-10-21 PROCEDURE — 94640 AIRWAY INHALATION TREATMENT: CPT

## 2018-10-21 RX ORDER — TRAMADOL HYDROCHLORIDE 50 MG/1
50 TABLET ORAL EVERY 6 HOURS PRN
Status: DISCONTINUED | OUTPATIENT
Start: 2018-10-22 | End: 2018-10-26 | Stop reason: HOSPADM

## 2018-10-21 RX ORDER — KETOROLAC TROMETHAMINE 30 MG/ML
30 INJECTION, SOLUTION INTRAMUSCULAR; INTRAVENOUS ONCE
Status: COMPLETED | OUTPATIENT
Start: 2018-10-21 | End: 2018-10-21

## 2018-10-21 RX ORDER — VANCOMYCIN HCL IN 5 % DEXTROSE 1.5G/250ML
1500 PLASTIC BAG, INJECTION (ML) INTRAVENOUS
Status: DISCONTINUED | OUTPATIENT
Start: 2018-10-21 | End: 2018-10-23

## 2018-10-21 RX ORDER — CEFTRIAXONE 1 G/1
1 INJECTION, POWDER, FOR SOLUTION INTRAMUSCULAR; INTRAVENOUS
Status: DISCONTINUED | OUTPATIENT
Start: 2018-10-21 | End: 2018-10-23

## 2018-10-21 RX ORDER — POTASSIUM CHLORIDE 20 MEQ/15ML
40 SOLUTION ORAL ONCE
Status: COMPLETED | OUTPATIENT
Start: 2018-10-21 | End: 2018-10-21

## 2018-10-21 RX ORDER — IPRATROPIUM BROMIDE AND ALBUTEROL SULFATE 2.5; .5 MG/3ML; MG/3ML
3 SOLUTION RESPIRATORY (INHALATION)
Status: DISCONTINUED | OUTPATIENT
Start: 2018-10-21 | End: 2018-10-22

## 2018-10-21 RX ORDER — HYDROMORPHONE HYDROCHLORIDE 1 MG/ML
0.5 INJECTION, SOLUTION INTRAMUSCULAR; INTRAVENOUS; SUBCUTANEOUS EVERY 8 HOURS PRN
Status: DISCONTINUED | OUTPATIENT
Start: 2018-10-21 | End: 2018-10-21

## 2018-10-21 RX ORDER — LANOLIN ALCOHOL/MO/W.PET/CERES
400 CREAM (GRAM) TOPICAL ONCE
Status: COMPLETED | OUTPATIENT
Start: 2018-10-21 | End: 2018-10-21

## 2018-10-21 RX ORDER — FUROSEMIDE 10 MG/ML
80 INJECTION INTRAMUSCULAR; INTRAVENOUS 2 TIMES DAILY
Status: DISCONTINUED | OUTPATIENT
Start: 2018-10-21 | End: 2018-10-21

## 2018-10-21 RX ORDER — FUROSEMIDE 10 MG/ML
40 INJECTION INTRAMUSCULAR; INTRAVENOUS 2 TIMES DAILY
Status: DISCONTINUED | OUTPATIENT
Start: 2018-10-21 | End: 2018-10-21

## 2018-10-21 RX ORDER — MAGNESIUM GLUCONATE 27 MG(500)
54 TABLET ORAL 2 TIMES DAILY
Status: DISCONTINUED | OUTPATIENT
Start: 2018-10-21 | End: 2018-10-21

## 2018-10-21 RX ORDER — HYDRALAZINE HYDROCHLORIDE 10 MG/1
10 TABLET, FILM COATED ORAL EVERY 8 HOURS
Status: DISCONTINUED | OUTPATIENT
Start: 2018-10-21 | End: 2018-10-21

## 2018-10-21 RX ORDER — METOPROLOL TARTRATE 25 MG/1
12.5 TABLET ORAL 2 TIMES DAILY
Status: DISCONTINUED | OUTPATIENT
Start: 2018-10-21 | End: 2018-10-23

## 2018-10-21 RX ORDER — ISOSORBIDE DINITRATE 10 MG/1
10 TABLET ORAL 3 TIMES DAILY
Status: DISCONTINUED | OUTPATIENT
Start: 2018-10-21 | End: 2018-10-21

## 2018-10-21 RX ADMIN — NICOTINE 1 PATCH: 21 PATCH, EXTENDED RELEASE TRANSDERMAL at 08:10

## 2018-10-21 RX ADMIN — METOPROLOL TARTRATE 12.5 MG: 25 TABLET, FILM COATED ORAL at 04:10

## 2018-10-21 RX ADMIN — IPRATROPIUM BROMIDE AND ALBUTEROL SULFATE 3 ML: .5; 3 SOLUTION RESPIRATORY (INHALATION) at 12:10

## 2018-10-21 RX ADMIN — IPRATROPIUM BROMIDE AND ALBUTEROL SULFATE 3 ML: .5; 3 SOLUTION RESPIRATORY (INHALATION) at 07:10

## 2018-10-21 RX ADMIN — HEPARIN SODIUM AND DEXTROSE 17 UNITS/KG/HR: 10000; 5 INJECTION INTRAVENOUS at 04:10

## 2018-10-21 RX ADMIN — POTASSIUM CHLORIDE 40 MEQ: 20 SOLUTION ORAL at 08:10

## 2018-10-21 RX ADMIN — HYDRALAZINE HYDROCHLORIDE 10 MG: 10 TABLET, FILM COATED ORAL at 04:10

## 2018-10-21 RX ADMIN — FUROSEMIDE 80 MG: 10 INJECTION, SOLUTION INTRAMUSCULAR; INTRAVENOUS at 04:10

## 2018-10-21 RX ADMIN — FLUTICASONE FUROATE AND VILANTEROL TRIFENATATE 1 PUFF: 100; 25 POWDER RESPIRATORY (INHALATION) at 08:10

## 2018-10-21 RX ADMIN — FUROSEMIDE 40 MG: 10 INJECTION, SOLUTION INTRAMUSCULAR; INTRAVENOUS at 11:10

## 2018-10-21 RX ADMIN — KETOROLAC TROMETHAMINE 30 MG: 30 INJECTION, SOLUTION INTRAMUSCULAR at 10:10

## 2018-10-21 RX ADMIN — MAGNESIUM OXIDE TAB 400 MG (241.3 MG ELEMENTAL MG) 400 MG: 400 (241.3 MG) TAB at 08:10

## 2018-10-21 RX ADMIN — CEFTRIAXONE SODIUM 1 G: 1 INJECTION, POWDER, FOR SOLUTION INTRAMUSCULAR; INTRAVENOUS at 10:10

## 2018-10-21 RX ADMIN — VANCOMYCIN HYDROCHLORIDE 1500 MG: 10 INJECTION, POWDER, LYOPHILIZED, FOR SOLUTION INTRAVENOUS at 10:10

## 2018-10-21 RX ADMIN — RANOLAZINE 500 MG: 500 TABLET, FILM COATED, EXTENDED RELEASE ORAL at 08:10

## 2018-10-21 RX ADMIN — ATORVASTATIN CALCIUM 40 MG: 20 TABLET, FILM COATED ORAL at 08:10

## 2018-10-21 RX ADMIN — SENNOSIDES AND DOCUSATE SODIUM 1 TABLET: 8.6; 5 TABLET ORAL at 08:10

## 2018-10-21 RX ADMIN — HEPARIN SODIUM AND DEXTROSE 17 UNITS/KG/HR: 10000; 5 INJECTION INTRAVENOUS at 10:10

## 2018-10-21 RX ADMIN — ISOSORBIDE DINITRATE 10 MG: 10 TABLET ORAL at 04:10

## 2018-10-21 RX ADMIN — POTASSIUM CHLORIDE 20 MEQ: 1500 TABLET, EXTENDED RELEASE ORAL at 08:10

## 2018-10-21 RX ADMIN — ACETAMINOPHEN 650 MG: 325 TABLET ORAL at 08:10

## 2018-10-21 NOTE — ASSESSMENT & PLAN NOTE
- heparin gtt ? Candidate for noac with bioprosthetic valve stenosis  - intially held b/p meds for perfusion sake d/t recent aphasia, now he has A flutter and low EF, will add low dose beta blocker, most likely will not accomplish much in terms of rate control.  Will make npo for possible NITA/ DCCV 10/22

## 2018-10-21 NOTE — PROCEDURES
ICU EEG/VIDEO MONITORING REPORT    Quentin Mckeon  [id number]  1955    DATE OF SERVICE: 10/20-21/18    DATE OF ADMISSION: 10/19/2018 10:06 AM    ADMITTING PROVIDER: Jennifer Johnson MD    REASON FOR CONSULT: 64yo M with AMS     METHODOLOGY   Electroencephalographic (EEG) recording is with electrodes placed according to the International 10-20 placement system.  Thirty two (32) channels of digital signal are simultaneously recorded from the scalp and may include EKG, EMG, and/or eye monitors.   Recording band pass was 0.1 to 512 hz.  Digital video recording of the patient is simultaneously recorded with the EEG.  The nursing staff report clinical symptoms and may press an event button when the patient has symptoms of clinical interest to the treating physicians.  EEG and video recording is stored and archived in digital format.  The entire recording is visually reviewed and the times identified by computer analysis as being spikes or seizures are reviewed again.  Activation procedures which include photic stimulation, hyperventilation and instructing patients to perform simple task are done in selected patients.   Compresses spectral analysis (CSA) is also performed on the activity recorded from each individual channel.  This is displayed as a power display of frequencies from 0 to 30 Hz over time.   The CSA analysis is done and displayed continuously.  This is reviewed for asymmetries in power between homologous areas of the scalp and for presence of changes in power which canbe seen when seizures occur.  Sections of suspected abnormalities on the CSA is then compared with the original EEG recording.     [employer] software was also utilized in the review of this study.  This software suite analyzes the EEG recording in multiple domains.  Coherence and rhythmicity is computed to identify EEG sections which may contain organized seizures.  Each channel undergoes analysis to detect presence of spike and sharp waves which  have special and morphological characteristic of epileptic activity.  The routine EEG recording is converted from spacial into frequency domain.  This is then displayed comparing homologous areas to identify areas of significant asymmetry.  Algorithm to identify non-cortically generated artifact is used to separate eye movement, EMG and other artifact from the EEG.      Recording Times  Start on 10/20/18, 16:50  Stop on 10/21/18, 11:35    A total of 18 hours and 45 minutes of EEG was recorded.    EEG FINDINGS  Background activity:   The background rhythm was characterized by theta-alpha (7-8 Hz) background with a posterior dominant rhythm of 8 Hz  Symmetry and continuity: the background was continuous and symmetric    Sleep:   Normal sleep transients including sleep spindles, K complexes, vertex waves and POSTS were seen.    Activation procedures:   Not done    Abnormal activity:   No epileptiform discharges, periodic discharges, lateralized rhythmic delta activity or electrographic seizures were seen.    Irregular heart rate noted on EKG.    IMPRESSION:   This is an abnormal C-EEG due to the mild generalized slowing seen, suggestive of mild diffuse or multifocal cerebral dysfunction.  No epileptiform activity or electrographic seizures seen.  Irregular heart rate noted on EKG.     CLINICAL CORRELATION IS RECOMMENDED.    Candi Martinez MD, HAWA(), FACNS, UOMAR.  Neurology-Epilepsy.  [facility].

## 2018-10-21 NOTE — ASSESSMENT & PLAN NOTE
- patient actually wasn't taking three of his meds, he cannot remember which three  - now hypotensive from entresto, BB, imdur, hydralazine, hold around event of recent aphasia episode vs stroke vs seizure, no continues to be lightheaded and dizzy despite holding meds, IVF and volume overloaded.  Will resume to take load off heart and follow closely. ? Cardiac origin or intracerebral, no s/s of stroke or aphasia

## 2018-10-21 NOTE — ASSESSMENT & PLAN NOTE
- stroke code called 10/169, head ct negative, aphasic and L sided weakness have resolved  - [facility] neuro concerned it also may have been a seizure, eeg completed, no s/s of seizure activity  - planning mri for Monday as they only do device mri's M-F with rep present.

## 2018-10-21 NOTE — PROGRESS NOTES
Verified with NP that BP meds should not be held unless BP is under 90/50s and patient is symptomatic. Will continue to monitor.

## 2018-10-21 NOTE — ASSESSMENT & PLAN NOTE
- , physical exam findings c/w acute heart failure exacerbation, prior EF 35%, echo pending, T bili elevated 2.0  - started Iv lasix 80mg bid, + tachypnea and elevated JVD/ HJR  - 10/19 significant event: episode of aphasia L sided weakness, no stroke noted on CT, EEG negative for seizure activity  - since aphasic event he's c/o LH /dizziness   - b/p's have been on lower side 's and all b/p meds / diuretics held (entresto (last fill several months ago), hydralazine, imdur, Toprol), 500ml IVF given and trendelenburg with no resolution.  ? IF LH/dizzy is cardiac related.  His EF is trending down to 20-25% from 30-35%, ? Alcohol induced vs A flutter vs combination  - planning NITA/ DCCV if he continues to be A flutter.    - + ETOH/ smoker abuse / Lives alone/ eats out / cooks with salt educated on all accounts/ noncompliant with filling meds.  Donaldo Headley NP has a list of all his medications [phone]  - CTS most likely not surgical candidate, awaiting [facility] on gradients.   - monitor weights daily  - monitor strict I/o's  - telemetry

## 2018-10-21 NOTE — PLAN OF CARE
Problem: Patient Care Overview  Goal: Plan of Care Review  Outcome: Ongoing (interventions implemented as appropriate)  Plan of care discussed with patient. Patient is free of fall/trauma/injury. Mag and Potassium replaced this AM. Lasix 40mg BID initiated. Urine is extremely dark and concentrated, MD aware. EEG completed, MRI planned for tomorrow pending pacemaker adjustment for MRI. Denies CP, SOB, or pain/discomfort. All questions addressed. Will continue to monitor

## 2018-10-21 NOTE — HPI
Mr. Mckeon is a 63 year old male with past medical history of HFrEF (EF 35%), VF/VT s/p BiV AICD (extraction and reimplantation 2/2018 with some lead retention related to lead position under clavicle), CAD s/p CABG 2014, MV bioprosthetic repair in 2014 now with severe restenosis, AFib on Xarelto, tobacco use, and ~ daily ETOH use with most recent who presented to [facility] with SOB and chest pain x 3 days.  He was diagnosed with ADHF thought secondary to mitral bioprosthetic valve restenosis.

## 2018-10-21 NOTE — PT/OT/SLP PROGRESS
Physical Therapy      Patient Name:  Quentin Mckeon   MRN:  [id number]    Patient not seen today secondary to (pt on hold 2nd to being on continuous EEG and r/o seizures vs CVA. ). Will follow-up at a later date. .    Shirlene Camejo, PT    10/21/2018

## 2018-10-21 NOTE — PROGRESS NOTES
Pt K+ was 3.5 with a Mg+ of 1.8. Dr. Cedillo notified. Ordered potassium 40mEq and Mg 400 for replacement.

## 2018-10-21 NOTE — SUBJECTIVE & OBJECTIVE
Interval History:  Patient c/o green productive cough this am.  Attempt to obtain sputum sample.  He has his EEG monitor underway, no sz activity thus far.. MRI due for Monday.      Review of Systems   Constitutional: Positive for activity change and fatigue. Negative for appetite change, chills and fever.   HENT: Negative for congestion and trouble swallowing.    Eyes: Negative for visual disturbance.   Respiratory: Positive for cough, chest tightness and shortness of breath. Negative for wheezing.         Productive green sputum cough this am   Cardiovascular: Positive for chest pain (patient also recent fall onto sternum d/t black episode while drinking). Negative for palpitations and leg swelling.   Gastrointestinal: Positive for abdominal distention and abdominal pain. Negative for constipation, nausea and vomiting.   Genitourinary: Negative for frequency and hematuria.   Musculoskeletal: Positive for arthralgias, back pain and gait problem. Negative for myalgias and neck pain.        Uses rolling walker to mobilize around     Skin: Negative for color change, pallor and rash.        Sternal notch hematoma    Neurological: Positive for dizziness, weakness and light-headedness. Negative for syncope, numbness and headaches.   Hematological: Bruises/bleeds easily.   Psychiatric/Behavioral: Negative for agitation and confusion. The patient is not nervous/anxious.      Objective:     Vital Signs (Most Recent):  Temp: 98.5 °F (36.9 °C) (10/21/18 0717)  Pulse: 96 (10/21/18 0839)  Resp: 16 (10/21/18 0839)  BP: 100/72 (10/21/18 0717)  SpO2: (!) 94 %(feels SOB) (10/21/18 0941) Vital Signs (24h Range):  Temp:  [96.2 °F (35.7 °C)-98.9 °F (37.2 °C)] 98.5 °F (36.9 °C)  Pulse:  [] 96  Resp:  [16-20] 16  SpO2:  [92 %-99 %] 94 %  BP: ()/(61-74) 100/72     Weight: 109.6 kg (241 lb 10 oz)  Body mass index is 34.67 kg/m².    Intake/Output Summary (Last 24 hours) at 10/21/2018 0951  Last data filed at 10/21/2018  0900  Gross per 24 hour   Intake 787.7 ml   Output 1750 ml   Net -962.3 ml      Physical Exam   Constitutional: He is oriented to person, place, and time. He appears well-developed and well-nourished. No distress.   HENT:   Head: Normocephalic and atraumatic.   Right Ear: External ear normal.   Left Ear: External ear normal.   Nose: Nose normal.   Mouth/Throat: Oropharynx is clear and moist.   Eyes: Conjunctivae and EOM are normal. No scleral icterus.   Neck: Normal range of motion. Neck supple. Hepatojugular reflux and JVD (pulsatile d/t tR jet) present. No tracheal deviation present. No thyromegaly present.   Cardiovascular: Normal rate, regular rhythm and intact distal pulses. Exam reveals no gallop and no friction rub.   Murmur (high pitched blowing III/VI mitral area) heard.  Pulmonary/Chest: Effort normal and breath sounds normal. No respiratory distress. He has no wheezes. He has no rales. He exhibits tenderness (d/t recent fall, reproducible with mild palpation).   Abdominal: Soft. Bowel sounds are normal. He exhibits distension. He exhibits no mass. There is no tenderness. There is no rebound and no guarding.   Musculoskeletal: Normal range of motion. He exhibits no edema or tenderness.   Neurological: He is alert and oriented to person, place, and time. No cranial nerve deficit or sensory deficit. He exhibits normal muscle tone. Coordination abnormal.   Skin: Skin is warm and dry. No rash noted. No erythema.   Psychiatric: He has a normal mood and affect. His behavior is normal. Judgment and thought content normal.   Nursing note and vitals reviewed.      Significant Labs:   CBC:   Recent Labs   Lab 10/19/18  2113 10/20/18  0404   WBC 4.79 4.65   HGB 11.3* 10.4*   HCT 35.4* 32.8*    153     CMP:   Recent Labs   Lab 10/19/18  1358 10/20/18  0404 10/21/18  0352    136 136   K 4.2 3.8 3.5   CL 98 96 98   CO2 27 29 28   GLU 86 91 104   BUN 39* 33* 28*   CREATININE 1.1 1.1 1.0   CALCIUM 10.0 9.3  9.0   PROT 6.9  --   --    ALBUMIN 3.3*  --   --    BILITOT 2.0*  --   --    ALKPHOS 133  --   --    AST 32  --   --    ALT 25  --   --    ANIONGAP 12 11 10   EGFRNONAA >60.0 >60.0 >60.0     Cardiac Markers:   Recent Labs   Lab 10/19/18  1358   *     Coagulation:   Recent Labs   Lab 10/19/18  2113  10/21/18  0352   INR 1.4*  --   --    APTT 27.1   < > 59.3*  59.3*    < > = values in this interval not displayed.       Significant Imaging: I have reviewed all pertinent imaging results/findings within the past 24 hours.

## 2018-10-21 NOTE — ASSESSMENT & PLAN NOTE
- Unsure if he is a xarelto candidate as he has stenosis of bioprosthetic valve.  Heparin on board in interim.

## 2018-10-21 NOTE — PLAN OF CARE
Problem: Patient Care Overview  Goal: Plan of Care Review  Outcome: Ongoing (interventions implemented as appropriate)  Pt remained free of falls/truma/injuries. No complaints of CP/SOB/discomfort. Pt is on 24 hr EEG monitoring. No s/s of seizure activity noted. Heparin gtt going at 17 units; last aPTT therapeutic. No disorientation noted during shift. Fall bundle in place. POC explained, no questions at this time. Pt tolerating care.

## 2018-10-21 NOTE — ASSESSMENT & PLAN NOTE
- Referred to CT to evaluate restenosis of bioprosthetic valve, CT surgery does not feel his is a candidate for surgery, will need old notes to look at gradient.    - now with atrial flutter which is ? valvular induced ? candidate for xarelto, bridge heparin / warfarin once plan is delineated.

## 2018-10-21 NOTE — PROGRESS NOTES
[facility] Hospital Medicine  Progress Note    Patient Name: Quentin Mckeon  MRN: [id number]  Patient Class: IP- Inpatient   Admission Date: 10/19/2018  Length of Stay: 2 days  Attending Physician: Justin Robles MD  Primary Care Provider: Primary Doctor [facility] Medicine Team: [facility] J Chaya Flores NP    Subjective:     Principal Problem:Acute on chronic systolic heart failure    HPI:  62 y/o AAM with PMH of sCHF (EF 35%), VF/VT s/p BiV AICD (extraction and reimplantation 2/2018 with some lead retention d/t inability to remove as it's stuck under clavicle), CAD s/p CABG 2014, MV bioprosthetic repair now with severe restenosis 2014, AF on Xarelto, + daily smoker, + etoh abuse (most recent drink about a week ago, passed out and hit chest on a kitchen cabinet, has not drank since, no h/o DT's) who presented to [facility] with SOB, chest pain x 3 days.  He was diagnosed with ADHF  secondary to mitral bioprosthetic restenosis. He had a prior admission for cough, body aches, productive cough, 10/12 flu swab negative so abx were stopped.  He admits to eating out, not watching his salt, per home pharmacy records he has not filled is amiodarone since 5/15/18.  He also appears to have been out of a most of his medications for at least a month or more.  Last fills are in home med rec tab.  Pt has had frequent readmissions suspected to be 2/2 MV stenosis.  In addition, pt had fall prior to admission which has not affected his AICD lead per interrogation, impedance normal.  There was a concern that he may have had a lead crush injury as he had a small sternal hematoma after passing out during a etoh induced binge and fell on kitchen cabinet.  His Xarelto was held initially but was restarted OSH. Interrogation also revealed what appears to be 2:1 flutter at rate of 100.  He had one episode on 10/19 that fell into the VF zone for which he was ATP'ed out of in one round.  Pt  transferred to [facility] for possible AICD extraction by Dr. Fitzgerald and consideration of MVR by Dr. Ortiz; case d/w these consultants per Dr. Jennifer Johnson note.           Hospital Course:  Transferred from [facility] to be evaluated by CTS for restenosis of bioprosthetic mitral valve and concern for crush injury to ICD lead d/t recent fall from black out episode while intoxicated and severe sternal contusion resulted.  CTS and EP consulted.  CTS awaiting surgical history from OSH, to see about his prosthetic size and gradient.  He was supposed to see them earlier this month, he lives alone and has transportation issues. He will most likely not be a surgical candidate at this time and the patient is aware.  [employer] Biv/ICD interrogated his device and he was noted to be in 2:1 atrial flutter which could be exacerbating his underlying heart failure.  He was started on heparin gtt as could be due to stenosis of his mitral valve and or worsening cardiomyopathy from etoh.  EP is planning NITA/ DCCV Monday if remains in flutter.  Cards comanaged consulted for medical management of his worening heart failure and restenosis of his mitral valve.  His urine is becoming darker, possibly d/t shearing of RBC's through tight valve, r/o hemolytic anemia.     On admission his meds were reviewed and he stated he was taking all his medications as prescribed, so they were ordered as prescribed and he received them with subsequent drop in his b/p from 120's to 90's.  Later that day he had an episode of aphasia and Left sided weakness, stroke code was called; head CT negative.  Neuro was concerned for seizure; eeg negative for seizure activity.  His ICD is MRI compatible, however MRI will only do devices M-F with the rep present.  His serial number is in EP's note dated 10/20/18.  He was given 500ml fluid over the course of several hours with no pulm edema, no increase in urine output, no change in his b/p despite holding all b/p  meds.  EEG has been stopped and prelim report is no seizure activity. He does not have any recollection of events that occurred.      He also had an episode of 10/10 chest pain 10/20, ECG V paced 101 bpm, with absolutely reproducible chest pain with mild palpation; appears he's having muscle spasms related to his fall onto his sternum.     Dispo; will need transportation back to [place]. He lives alone and uses a walker for mobility d/t weakness.     Interval History:  Patient c/o green productive cough this am.  Attempt to obtain sputum sample.  He has his EEG monitor underway, no sz activity thus far.. MRI due for Monday.      Review of Systems   Constitutional: Positive for activity change and fatigue. Negative for appetite change, chills and fever.   HENT: Negative for congestion and trouble swallowing.    Eyes: Negative for visual disturbance.   Respiratory: Positive for cough, chest tightness and shortness of breath. Negative for wheezing.         Productive green sputum cough this am   Cardiovascular: Positive for chest pain (patient also recent fall onto sternum d/t black episode while drinking). Negative for palpitations and leg swelling.   Gastrointestinal: Positive for abdominal distention and abdominal pain. Negative for constipation, nausea and vomiting.   Genitourinary: Negative for frequency and hematuria.   Musculoskeletal: Positive for arthralgias, back pain and gait problem. Negative for myalgias and neck pain.        Uses rolling walker to mobilize around     Skin: Negative for color change, pallor and rash.        Sternal notch hematoma    Neurological: Positive for dizziness, weakness and light-headedness. Negative for syncope, numbness and headaches.   Hematological: Bruises/bleeds easily.   Psychiatric/Behavioral: Negative for agitation and confusion. The patient is not nervous/anxious.      Objective:     Vital Signs (Most Recent):  Temp: 98.5 °F (36.9 °C) (10/21/18 0717)  Pulse: 96 (10/21/18  0839)  Resp: 16 (10/21/18 0839)  BP: 100/72 (10/21/18 0717)  SpO2: (!) 94 %(feels SOB) (10/21/18 0941) Vital Signs (24h Range):  Temp:  [96.2 °F (35.7 °C)-98.9 °F (37.2 °C)] 98.5 °F (36.9 °C)  Pulse:  [] 96  Resp:  [16-20] 16  SpO2:  [92 %-99 %] 94 %  BP: ()/(61-74) 100/72     Weight: 109.6 kg (241 lb 10 oz)  Body mass index is 34.67 kg/m².    Intake/Output Summary (Last 24 hours) at 10/21/2018 0951  Last data filed at 10/21/2018 0900  Gross per 24 hour   Intake 787.7 ml   Output 1750 ml   Net -962.3 ml      Physical Exam   Constitutional: He is oriented to person, place, and time. He appears well-developed and well-nourished. No distress.   HENT:   Head: Normocephalic and atraumatic.   Right Ear: External ear normal.   Left Ear: External ear normal.   Nose: Nose normal.   Mouth/Throat: Oropharynx is clear and moist.   Eyes: Conjunctivae and EOM are normal. No scleral icterus.   Neck: Normal range of motion. Neck supple. Hepatojugular reflux and JVD (pulsatile d/t tR jet) present. No tracheal deviation present. No thyromegaly present.   Cardiovascular: Normal rate, regular rhythm and intact distal pulses. Exam reveals no gallop and no friction rub.   Murmur (high pitched blowing III/VI mitral area) heard.  Pulmonary/Chest: Effort normal and breath sounds normal. No respiratory distress. He has no wheezes. He has no rales. He exhibits tenderness (d/t recent fall, reproducible with mild palpation).   Abdominal: Soft. Bowel sounds are normal. He exhibits distension. He exhibits no mass. There is no tenderness. There is no rebound and no guarding.   Musculoskeletal: Normal range of motion. He exhibits no edema or tenderness.   Neurological: He is alert and oriented to person, place, and time. No cranial nerve deficit or sensory deficit. He exhibits normal muscle tone. Coordination abnormal.   Skin: Skin is warm and dry. No rash noted. No erythema.   Psychiatric: He has a normal mood and affect. His behavior  is normal. Judgment and thought content normal.   Nursing note and vitals reviewed.      Significant Labs:   CBC:   Recent Labs   Lab 10/19/18  2113 10/20/18  0404   WBC 4.79 4.65   HGB 11.3* 10.4*   HCT 35.4* 32.8*    153     CMP:   Recent Labs   Lab 10/19/18  1358 10/20/18  0404 10/21/18  0352    136 136   K 4.2 3.8 3.5   CL 98 96 98   CO2 27 29 28   GLU 86 91 104   BUN 39* 33* 28*   CREATININE 1.1 1.1 1.0   CALCIUM 10.0 9.3 9.0   PROT 6.9  --   --    ALBUMIN 3.3*  --   --    BILITOT 2.0*  --   --    ALKPHOS 133  --   --    AST 32  --   --    ALT 25  --   --    ANIONGAP 12 11 10   EGFRNONAA >60.0 >60.0 >60.0     Cardiac Markers:   Recent Labs   Lab 10/19/18  1358   *     Coagulation:   Recent Labs   Lab 10/19/18  2113  10/21/18  0352   INR 1.4*  --   --    APTT 27.1   < > 59.3*  59.3*    < > = values in this interval not displayed.       Significant Imaging: I have reviewed all pertinent imaging results/findings within the past 24 hours.    Assessment/Plan:      * Acute on chronic systolic heart failure    - , physical exam findings c/w acute heart failure exacerbation, prior EF 35%, echo pending, T bili elevated 2.0  - started Iv lasix 80mg bid, + tachypnea and elevated JVD/ HJR  - 10/19 significant event: episode of aphasia L sided weakness, no stroke noted on CT, EEG negative for seizure activity  - since aphasic event he's c/o LH /dizziness   - b/p's have been on lower side 's and all b/p meds / diuretics held (entresto (last fill several months ago), hydralazine, imdur, Toprol), 500ml IVF given and trendelenburg with no resolution.  ? IF LH/dizzy is cardiac related.  His EF is trending down to 20-25% from 30-35%, ? Alcohol induced vs A flutter vs combination  - planning NITA/ DCCV if he continues to be A flutter.    - + ETOH/ smoker abuse / Lives alone/ eats out / cooks with salt educated on all accounts/ noncompliant with filling meds.  Donaldo Headley NP has a list of all his  medications [phone]  - CTS most likely not surgical candidate, awaiting records from [facility] on gradients.   - monitor weights daily  - monitor strict I/o's  - telemetry      Stroke vs seizure    - stroke code called 10/169, head ct negative, aphasic and L sided weakness have resolved  - [facility] neuro concerned it also may have been a seizure, eeg completed, no s/s of seizure activity  - planning mri for Monday as they only do device mri's M-F with rep present.        Prosthetic mitral valve stenosis bioprosthetic 2014    - Referred to CT to evaluate restenosis of bioprosthetic valve, CT surgery does not feel his is a candidate for surgery, will need old notes to look at gradient.    - now with atrial flutter which is ? valvular induced ? candidate for xarelto, bridge heparin / warfarin once plan is delineated.      Biventricular ICD (implantable cardioverter-defibrillator) in place    - h/o BIV ICD extract implant 2/18, case complicated by lead retention  - EP consulted, h/o VT/VF, no amiodarone filled since May 2018       AICD lead malfunction    - no lead malfunction, interrogation impedance is fine, no lead issues       Ventricular tachycardia, monomorphic    - h/o one episode that landed in VF zone, terminated after one round of ATP  - Discuss with EP about restarting amiodarone and or need to get NITA first as he has also possible 2:1 flutter and risk of chemical cardioversion        Hx of CABG 2014    See above        Coronary artery disease involving native coronary artery of native heart without angina pectoris    - h/o CABG 2014 with bioprosthetic valve, no angina, recent cp d/t trauma from fall during black out episode while drunk. Concern for cardiac contusion.    Echo no evidence of contusion  -  1 - Severely depressed left ventricular systolic function (EF 20-25%).     2 - Concentric remodeling.     3 - Biatrial enlargement.     4 - Low normal to mildly depressed right ventricular systolic  function .     5 - Mitral valve prosthesis.     6 - Moderate tricuspid regurgitation.     7 - Increased central venous pressure.     8 - Pulmonary hypertension. The estimated PA systolic pressure is 52 mmHg.      Benign essential HTN    - patient actually wasn't taking three of his meds, he cannot remember which three  - now hypotensive from entresto, BB, imdur, hydralazine, hold around event of recent aphasia episode vs stroke vs seizure, no continues to be lightheaded and dizzy despite holding meds, IVF and volume overloaded.  Will resume to take load off heart and follow closely. ? Cardiac origin or intracerebral, no s/s of stroke or aphasia       Chronic anticoagulation    - Unsure if he is a xarelto candidate as he has stenosis of bioprosthetic valve.  Heparin on board in interim.          Atrial flutter    - heparin gtt ? Candidate for noac with bioprosthetic valve stenosis  - intially held b/p meds for perfusion sake d/t recent aphasia, now he has A flutter and low EF, will add low dose beta blocker, most likely will not accomplish much in terms of rate control.  Will make npo for possible NITA/ DCCV 10/22       HLD (hyperlipidemia)    - atorva 80  -   Lab Results   Component Value Date    CHOL 98 (L) 10/20/2018     Lab Results   Component Value Date    HDL 25 (L) 10/20/2018     Lab Results   Component Value Date    LDLCALC 60.2 (L) 10/20/2018     Lab Results   Component Value Date    TRIG 64 10/20/2018     Lab Results   Component Value Date    CHOLHDL 25.5 10/20/2018            VTE Risk Mitigation (From admission, onward)        Ordered     heparin 25,000 units in dextrose 5% 250 mL (100 units/mL) infusion LOW INTENSITY nomogram - [facility]  Continuous      10/19/18 1925     heparin 25,000 units in dextrose 5% (100 units/ml) IV bolus from bag - ADDITIONAL PRN BOLUS - 60 units/kg  As needed (PRN)      10/19/18 1925     heparin 25,000 units in dextrose 5% (100 units/ml) IV bolus from bag - ADDITIONAL PRN BOLUS - 30  units/kg  As needed (PRN)      10/19/18 1925              Chaya Flores NP  Department of Hospital Medicine   [facility]

## 2018-10-22 LAB
ANION GAP SERPL CALC-SCNC: 9 MMOL/L
APTT BLDCRRT: 51.3 SEC
BASOPHILS # BLD AUTO: 0.02 K/UL
BASOPHILS NFR BLD: 0.4 %
BUN SERPL-MCNC: 27 MG/DL
CALCIUM SERPL-MCNC: 9 MG/DL
CHLORIDE SERPL-SCNC: 96 MMOL/L
CK MB SERPL-MCNC: 0.9 NG/ML
CK MB SERPL-RTO: 2.3 %
CK SERPL-CCNC: 40 U/L
CO2 SERPL-SCNC: 31 MMOL/L
CREAT SERPL-MCNC: 1.3 MG/DL
DIFFERENTIAL METHOD: ABNORMAL
EOSINOPHIL # BLD AUTO: 0.1 K/UL
EOSINOPHIL NFR BLD: 1.9 %
ERYTHROCYTE [DISTWIDTH] IN BLOOD BY AUTOMATED COUNT: 16.9 %
EST. GFR  (AFRICAN AMERICAN): >60 ML/MIN/1.73 M^2
EST. GFR  (NON AFRICAN AMERICAN): 58.1 ML/MIN/1.73 M^2
GLUCOSE SERPL-MCNC: 84 MG/DL
HAPTOGLOB SERPL-MCNC: 96 MG/DL
HCT VFR BLD AUTO: 36.5 %
HGB BLD-MCNC: 11.1 G/DL
IMM GRANULOCYTES # BLD AUTO: 0.03 K/UL
IMM GRANULOCYTES NFR BLD AUTO: 0.6 %
LDH SERPL L TO P-CCNC: 271 U/L
LYMPHOCYTES # BLD AUTO: 1.6 K/UL
LYMPHOCYTES NFR BLD: 34.2 %
MAGNESIUM SERPL-MCNC: 1.9 MG/DL
MCH RBC QN AUTO: 31.4 PG
MCHC RBC AUTO-ENTMCNC: 30.4 G/DL
MCV RBC AUTO: 103 FL
MONOCYTES # BLD AUTO: 0.7 K/UL
MONOCYTES NFR BLD: 15.3 %
NEUTROPHILS # BLD AUTO: 2.3 K/UL
NEUTROPHILS NFR BLD: 47.6 %
NRBC BLD-RTO: 0 /100 WBC
PLATELET # BLD AUTO: 142 K/UL
PMV BLD AUTO: 10.8 FL
POTASSIUM SERPL-SCNC: 3.6 MMOL/L
PROCALCITONIN SERPL IA-MCNC: 0.36 NG/ML
RBC # BLD AUTO: 3.53 M/UL
RETICS/RBC NFR AUTO: 2.6 %
SODIUM SERPL-SCNC: 136 MMOL/L
TROPONIN I SERPL DL<=0.01 NG/ML-MCNC: 0.2 NG/ML
VANCOMYCIN SERPL-MCNC: 19.4 UG/ML
WBC # BLD AUTO: 4.77 K/UL

## 2018-10-22 PROCEDURE — 85025 COMPLETE CBC W/AUTO DIFF WBC: CPT

## 2018-10-22 PROCEDURE — 83735 ASSAY OF MAGNESIUM: CPT

## 2018-10-22 PROCEDURE — 63600175 PHARM REV CODE 636 W HCPCS: Performed by: HOSPITALIST

## 2018-10-22 PROCEDURE — 97165 OT EVAL LOW COMPLEX 30 MIN: CPT

## 2018-10-22 PROCEDURE — 27000221 HC OXYGEN, UP TO 24 HOURS

## 2018-10-22 PROCEDURE — 94640 AIRWAY INHALATION TREATMENT: CPT

## 2018-10-22 PROCEDURE — 94761 N-INVAS EAR/PLS OXIMETRY MLT: CPT

## 2018-10-22 PROCEDURE — 80048 BASIC METABOLIC PNL TOTAL CA: CPT

## 2018-10-22 PROCEDURE — 83615 LACTATE (LD) (LDH) ENZYME: CPT

## 2018-10-22 PROCEDURE — 84484 ASSAY OF TROPONIN QUANT: CPT

## 2018-10-22 PROCEDURE — 87205 SMEAR GRAM STAIN: CPT

## 2018-10-22 PROCEDURE — 85730 THROMBOPLASTIN TIME PARTIAL: CPT

## 2018-10-22 PROCEDURE — 20600001 HC STEP DOWN PRIVATE ROOM

## 2018-10-22 PROCEDURE — 87070 CULTURE OTHR SPECIMN AEROBIC: CPT

## 2018-10-22 PROCEDURE — 85045 AUTOMATED RETICULOCYTE COUNT: CPT

## 2018-10-22 PROCEDURE — 25000003 PHARM REV CODE 250: Performed by: NURSE PRACTITIONER

## 2018-10-22 PROCEDURE — 99232 SBSQ HOSP IP/OBS MODERATE 35: CPT | Mod: ,,, | Performed by: NURSE PRACTITIONER

## 2018-10-22 PROCEDURE — 84145 PROCALCITONIN (PCT): CPT

## 2018-10-22 PROCEDURE — 25000242 PHARM REV CODE 250 ALT 637 W/ HCPCS: Performed by: NURSE PRACTITIONER

## 2018-10-22 PROCEDURE — S4991 NICOTINE PATCH NONLEGEND: HCPCS | Performed by: NURSE PRACTITIONER

## 2018-10-22 PROCEDURE — 36415 COLL VENOUS BLD VENIPUNCTURE: CPT

## 2018-10-22 PROCEDURE — 83010 ASSAY OF HAPTOGLOBIN QUANT: CPT

## 2018-10-22 PROCEDURE — 80202 ASSAY OF VANCOMYCIN: CPT

## 2018-10-22 PROCEDURE — 63600175 PHARM REV CODE 636 W HCPCS: Performed by: NURSE PRACTITIONER

## 2018-10-22 RX ORDER — DIGOXIN 0.25 MG/ML
125 INJECTION INTRAMUSCULAR; INTRAVENOUS EVERY 6 HOURS SCHEDULED
Status: COMPLETED | OUTPATIENT
Start: 2018-10-23 | End: 2018-10-23

## 2018-10-22 RX ORDER — DIGOXIN 125 MCG
0.12 TABLET ORAL DAILY
Status: DISCONTINUED | OUTPATIENT
Start: 2018-10-24 | End: 2018-10-26 | Stop reason: HOSPADM

## 2018-10-22 RX ORDER — IPRATROPIUM BROMIDE AND ALBUTEROL SULFATE 2.5; .5 MG/3ML; MG/3ML
3 SOLUTION RESPIRATORY (INHALATION)
Status: DISCONTINUED | OUTPATIENT
Start: 2018-10-23 | End: 2018-10-26 | Stop reason: HOSPADM

## 2018-10-22 RX ORDER — DIGOXIN 0.25 MG/ML
250 INJECTION INTRAMUSCULAR; INTRAVENOUS ONCE
Status: COMPLETED | OUTPATIENT
Start: 2018-10-22 | End: 2018-10-22

## 2018-10-22 RX ADMIN — NICOTINE 1 PATCH: 21 PATCH, EXTENDED RELEASE TRANSDERMAL at 09:10

## 2018-10-22 RX ADMIN — METOPROLOL TARTRATE 12.5 MG: 25 TABLET, FILM COATED ORAL at 08:10

## 2018-10-22 RX ADMIN — RANOLAZINE 500 MG: 500 TABLET, FILM COATED, EXTENDED RELEASE ORAL at 08:10

## 2018-10-22 RX ADMIN — HEPARIN SODIUM AND DEXTROSE 17 UNITS/KG/HR: 10000; 5 INJECTION INTRAVENOUS at 02:10

## 2018-10-22 RX ADMIN — SENNOSIDES AND DOCUSATE SODIUM 1 TABLET: 8.6; 5 TABLET ORAL at 09:10

## 2018-10-22 RX ADMIN — FLUTICASONE FUROATE AND VILANTEROL TRIFENATATE 1 PUFF: 100; 25 POWDER RESPIRATORY (INHALATION) at 09:10

## 2018-10-22 RX ADMIN — POTASSIUM CHLORIDE 20 MEQ: 1500 TABLET, EXTENDED RELEASE ORAL at 09:10

## 2018-10-22 RX ADMIN — IPRATROPIUM BROMIDE AND ALBUTEROL SULFATE 3 ML: .5; 3 SOLUTION RESPIRATORY (INHALATION) at 07:10

## 2018-10-22 RX ADMIN — DIGOXIN 250 MCG: 0.25 INJECTION INTRAMUSCULAR; INTRAVENOUS at 09:10

## 2018-10-22 RX ADMIN — LOSARTAN POTASSIUM 12.5 MG: 25 TABLET, FILM COATED ORAL at 11:10

## 2018-10-22 RX ADMIN — ATORVASTATIN CALCIUM 40 MG: 20 TABLET, FILM COATED ORAL at 08:10

## 2018-10-22 RX ADMIN — IPRATROPIUM BROMIDE AND ALBUTEROL SULFATE 3 ML: .5; 3 SOLUTION RESPIRATORY (INHALATION) at 06:10

## 2018-10-22 RX ADMIN — IPRATROPIUM BROMIDE AND ALBUTEROL SULFATE 3 ML: .5; 3 SOLUTION RESPIRATORY (INHALATION) at 12:10

## 2018-10-22 RX ADMIN — VANCOMYCIN HYDROCHLORIDE 1500 MG: 10 INJECTION, POWDER, LYOPHILIZED, FOR SOLUTION INTRAVENOUS at 09:10

## 2018-10-22 RX ADMIN — ACETAMINOPHEN 650 MG: 325 TABLET ORAL at 06:10

## 2018-10-22 RX ADMIN — SENNOSIDES AND DOCUSATE SODIUM 1 TABLET: 8.6; 5 TABLET ORAL at 08:10

## 2018-10-22 RX ADMIN — RANOLAZINE 500 MG: 500 TABLET, FILM COATED, EXTENDED RELEASE ORAL at 09:10

## 2018-10-22 RX ADMIN — METOPROLOL TARTRATE 12.5 MG: 25 TABLET, FILM COATED ORAL at 09:10

## 2018-10-22 RX ADMIN — VANCOMYCIN HYDROCHLORIDE 1500 MG: 10 INJECTION, POWDER, LYOPHILIZED, FOR SOLUTION INTRAVENOUS at 10:10

## 2018-10-22 RX ADMIN — CEFTRIAXONE SODIUM 1 G: 1 INJECTION, POWDER, FOR SOLUTION INTRAMUSCULAR; INTRAVENOUS at 09:10

## 2018-10-22 NOTE — PLAN OF CARE
Problem: Patient Care Overview  Goal: Plan of Care Review  Outcome: Ongoing (interventions implemented as appropriate)  Plan of care discussed with patient. Patient is free of fall/trauma/injury. MRI canceled due to patient having a loose lead on pacemaker. Patient has extremely dark urine. Losartan started this AM. Heparin gtt continued at ordered rate.  Continued on VANC and rocephin for potential pneumonia, awaiting sputum culture. Denies CP, SOB, or pain/discomfort. All questions addressed. Will continue to monitor

## 2018-10-22 NOTE — NURSING TRANSFER
Nursing Transfer Note      10/22/2018     Transfer To: XRay    Transfer via stretcher    Transfer with cardiac monitoring    Transported by transport    Medicines sent: Heparin    Chart send with patient: No

## 2018-10-22 NOTE — PROGRESS NOTES
[facility]  Cardiac Electrophysiology  Progress Note    Admission Date: 10/19/2018  Code Status: Full Code   Attending Physician: Justin Robles MD   Expected Discharge Date: 10/26/2018  Principal Problem:Acute on chronic systolic heart failure    Subjective:     Interval History: No events overnight    Tele: A-sensed, V-paced, HR 70-90    ROS  Objective:     Vital Signs (Most Recent):  Temp: 97.9 °F (36.6 °C) (10/21/18 1923)  Pulse: 96 (10/21/18 1946)  Resp: 20 (10/21/18 1946)  BP: (!) 89/66 (10/21/18 1923)  SpO2: 98 % (10/21/18 1946) Vital Signs (24h Range):  Temp:  [96.2 °F (35.7 °C)-98.9 °F (37.2 °C)] 97.9 °F (36.6 °C)  Pulse:  [] 96  Resp:  [16-20] 20  SpO2:  [92 %-99 %] 98 %  BP: ()/(66-76) 89/66     Weight: 109.6 kg (241 lb 10 oz)  Body mass index is 34.67 kg/m².     SpO2: 98 %  O2 Device (Oxygen Therapy): nasal cannula    Physical Exam   Constitutional: He appears well-developed and well-nourished. No distress.   Alert and oriented with good insight   HENT:   Head: Normocephalic and atraumatic.   Mouth/Throat: Oropharynx is clear and moist.   Eyes: Conjunctivae and EOM are normal. Pupils are equal, round, and reactive to light. No scleral icterus.   Neck: Normal range of motion. Neck supple. No JVD present.   Cardiovascular: Regular rhythm, normal heart sounds and intact distal pulses. Tachycardia present.   No murmur heard.  Pulses:       Radial pulses are 2+ on the right side, and 2+ on the left side.   Pulmonary/Chest: Effort normal and breath sounds normal. No respiratory distress.   Symmetrical expansion   Abdominal: Soft. Bowel sounds are normal. There is no hepatosplenomegaly. There is no tenderness.   Musculoskeletal: Normal range of motion. He exhibits no edema.   Neurological: He is alert.   Moves all extremities   Skin: Skin is warm and dry. No rash noted. He is not diaphoretic.       Significant Labs:   EP:   Recent Labs   Lab 10/19/18  8921 10/20/18  5781  10/21/18  0352 10/21/18  1022   NA  --  136 136  --    K  --  3.8 3.5  --    CL  --  96 98  --    CO2  --  29 28  --    GLU  --  91 104  --    BUN  --  33* 28*  --    CREATININE  --  1.1 1.0  --    CALCIUM  --  9.3 9.0  --    ANIONGAP  --  11 10  --    ESTGFRAFRICA  --  >60.0 >60.0  --    EGFRNONAA  --  >60.0 >60.0  --    WBC 4.79 4.65  --  4.41   HGB 11.3* 10.4*  --  9.9*   HCT 35.4* 32.8*  --  31.9*    153  --  132*   INR 1.4*  --   --   --      Assessment and Plan:     Atrial flutter    Atrial flutter, rate controlled, heart failure exacerbation due to medication non-compliance. Given his alcohol abuse and medication non-compliance, patient is at high-risk for NITA/DCCV due to risk of stroke from risk of medication non-compliance after the procedure.     - continue heparin gtt  - will defer decision for NITA until medication compliance can be established     Biventricular ICD (implantable cardioverter-defibrillator) in place    ICD is a [employer] Iperia 7 HF-T. S/N: [id number] (PID: [id number])         John Paul Isabel MD  Cardiac Electrophysiology  [facility]

## 2018-10-22 NOTE — PLAN OF CARE
Problem: Occupational Therapy Goal  Goal: Occupational Therapy Goal  Goals to be met by: 11/5/2018    Pt will complete UB dressing with independence.   Pt will complete LB dressing with SBA.  Pt will complete grooming while standing at sink x10 minutes with supervision.  Pt will complete toilet transfer with SBA utilizing AD as needed.  Pt will complete toileting with SBA.  Pt will engage in functional mobility to simulate household and community mobility distances with supervision utilizing AD as needed in order to maximize functional activity tolerance required for engagement in occupations of choice.   Pt will retrieve 3/3 ADL items from varied height surfaces utilizing AD as needed for support with SBA and no noted LOB.          Outcome: Ongoing (interventions implemented as appropriate)  OT evaluation completed and POC initiated 10/22/2018. Pt would benefit from continued skilled acute OT services at this time to maximize independence with functional mobility, functional transfers, and ADLs. Pt would benefit from HH at d/c in order to ensure safe return to PLOF and the least restrictive environment.

## 2018-10-22 NOTE — PROGRESS NOTES
Pt complaining of crushing CP, Dr. Velarde notified. Ordered STAT EKG, troponin, lactate, and CXR. Also orderd Ketorolac. Will continue to monitor.

## 2018-10-22 NOTE — PROGRESS NOTES
Notified Primary team of K of 3.6 and MAG of 1.9, no replacement ordered. Will continue to monitor.

## 2018-10-22 NOTE — ASSESSMENT & PLAN NOTE
Atrial flutter, rate controlled in the setting of heart failure exacerbation due to medication non-compliance compounded by valvular disease of uncertain severity. Reviewed medication compliance with patient and he stresses the desire to comply with medicines from here on out. Neurology feels that anti-coagulation is appropriate in this patient, which would be appropriate for a DCCV plan.    - continue heparin gtt  - NPO after midnight  - will evaluate for NITA + DCCV in AM

## 2018-10-22 NOTE — PROGRESS NOTES
Brief EP Note:    Patient reviewed with staff and interrogation reviewed. Discussed with primary team-has failed to improve significantly with current therapy and concern AFL preventing his care from progressing.   Will plan for NITA/DCCV tomorrow AM.   Keep NPO at midnight.     Denver Keyes MD  Cardiology Fellow PGY-5  Pager: [phone]

## 2018-10-22 NOTE — SUBJECTIVE & OBJECTIVE
History reviewed. No pertinent past medical history.    Past Surgical History:   Procedure Laterality Date    EXTRACTION-LEAD N/A 2/12/2018    Performed by Jose Fitzgerald MD at [facility] OR 2ND FLR    EXTRACTION-LEAD N/A 2/12/2018    Performed by Jose Fitzgerald MD at [facility] CATH LAB    INSERTION-ICD-BIVENTRICULAR N/A 2/12/2018    Performed by Jose Fitzgerald MD at [facility] CATH LAB       Review of patient's allergies indicates:  No Known Allergies    No current facility-administered medications on file prior to encounter.      Current Outpatient Medications on File Prior to Encounter   Medication Sig    albuterol (PROAIR HFA) 90 mcg/actuation inhaler Inhale 2 puffs into the lungs every 6 (six) hours as needed for Wheezing. Rescue    atorvastatin (LIPITOR) 40 MG tablet Take 40 mg by mouth once daily.    budesonide-formoterol 160-4.5 mcg (SYMBICORT) 160-4.5 mcg/actuation HFAA Inhale 2 puffs into the lungs every 12 (twelve) hours. Controller    furosemide (LASIX) 40 MG tablet Take 1 tablet (40 mg total) by mouth 2 (two) times daily. (Patient taking differently: Take 80 mg by mouth 2 (two) times daily. )    hydrALAZINE (APRESOLINE) 25 MG tablet Take 1 tablet (25 mg total) by mouth 3 (three) times daily. Do not take this medicine until you see your PCP in 1 week    isosorbide mononitrate (IMDUR) 30 MG 24 hr tablet Take 1 tablet (30 mg total) by mouth once daily.    magnesium oxide (MAG-OX) 400 mg (241.3 mg magnesium) tablet Take 400 mg by mouth once daily.    metOLazone (ZAROXOLYN) 5 MG tablet Take 5 mg by mouth as needed. Twice weekly as needed    metoprolol succinate (TOPROL-XL) 25 MG 24 hr tablet Take 12.5 mg by mouth once daily.    potassium chloride (KLOR-CON) 10 MEQ TbSR Take 20 mEq by mouth 3 (three) times daily. 2  Caps tid    ranolazine (RANEXA) 500 MG Tb12 Take 500 mg by mouth 2 (two) times daily.    rivaroxaban (XARELTO) 20 mg Tab Take 1 tablet (20 mg total) by mouth once daily. Do not  take this medication for 5 days after procedure (restart on 2/18/2018) (Patient taking differently: Take 20 mg by mouth once daily. )    sacubitril-valsartan (ENTRESTO) 49-51 mg per tablet Take 1 tablet by mouth 2 (two) times daily.    acetaminophen (TYLENOL) 325 MG tablet Take 2 tablets (650 mg total) by mouth every 4 (four) hours as needed.    amiodarone (PACERONE) 200 MG Tab Take 1 tablet (200 mg total) by mouth 2 (two) times daily. Take 200mg twice a day until seen by Dr. Fitzgerald in clinic (Patient taking differently: Take 200 mg by mouth once daily. Take 200mg a day at home not listed in transfer med list, though he said he took it yesterday)    nitroGLYCERIN (NITROSTAT) 0.4 MG SL tablet Place 0.4 mg under the tongue every 5 (five) minutes as needed for Chest pain.     Family History     None        Tobacco Use    Smoking status: Current Every Day Smoker     Packs/day: 0.25     Years: 50.00     Pack years: 12.50    Smokeless tobacco: Current User     Types: Snuff   Substance and Sexual Activity    Alcohol use: No    Drug use: No    Sexual activity: Not on file     Review of Systems   Constitution: Negative for chills, fever, weakness and weight gain.   HENT: Negative for congestion.         Neck pain   Eyes: Negative for visual disturbance.   Cardiovascular: Positive for leg swelling (improving). Negative for chest pain, claudication, dyspnea on exertion, orthopnea, palpitations and syncope.   Respiratory: Positive for shortness of breath. Negative for cough and snoring.    Hematologic/Lymphatic: Does not bruise/bleed easily.   Skin: Negative for rash.   Musculoskeletal: Negative for muscle cramps and myalgias.   Gastrointestinal: Negative for bloating, abdominal pain, constipation, diarrhea and melena.   Genitourinary: Negative for bladder incontinence.   Neurological: Negative for excessive daytime sleepiness and focal weakness.   Psychiatric/Behavioral: Negative for depression and suicidal ideas.      Objective:     Vital Signs (Most Recent):  Temp: 97.9 °F (36.6 °C) (10/21/18 1923)  Pulse: 96 (10/21/18 1946)  Resp: 20 (10/21/18 1946)  BP: (!) 89/66 (10/21/18 1923)  SpO2: 98 % (10/21/18 1946) Vital Signs (24h Range):  Temp:  [96.2 °F (35.7 °C)-98.9 °F (37.2 °C)] 97.9 °F (36.6 °C)  Pulse:  [] 96  Resp:  [16-20] 20  SpO2:  [92 %-99 %] 98 %  BP: ()/(66-76) 89/66     Weight: 109.6 kg (241 lb 10 oz)  Body mass index is 34.67 kg/m².    SpO2: 98 %  O2 Device (Oxygen Therapy): nasal cannula      Intake/Output Summary (Last 24 hours) at 10/21/2018 2203  Last data filed at 10/21/2018 1800  Gross per 24 hour   Intake 837.6 ml   Output 1675 ml   Net -837.4 ml       Lines/Drains/Airways     Peripheral Intravenous Line                 Peripheral IV - Single Lumen 10/19/18 0800 Left Antecubital 2 days         Peripheral IV - Single Lumen 10/19/18 0800 Right Wrist 2 days                Physical Exam   Constitutional: He is oriented to person, place, and time. He appears well-developed and well-nourished. No distress.   HENT:   Head: Normocephalic and atraumatic.   Mouth/Throat: Oropharynx is clear and moist.   Eyes: Conjunctivae and EOM are normal. Pupils are equal, round, and reactive to light. No scleral icterus.   Neck: Normal range of motion. Neck supple. JVD: difficult to discern JVD.   Cardiovascular: Regular rhythm and intact distal pulses. Tachycardia present.   Murmur heard.  Pulses:       Radial pulses are 2+ on the right side, and 2+ on the left side.   Pulmonary/Chest: Effort normal and breath sounds normal. No respiratory distress.   Symmetrical expansion, mild rales at the bases   Abdominal: Soft. Bowel sounds are normal. There is no hepatosplenomegaly. There is no tenderness.   Musculoskeletal: Normal range of motion. He exhibits no edema.   Bilateral edema 1-2+, with decreased skin turgor   Neurological: He is alert and oriented to person, place, and time. No cranial nerve deficit.   Skin: Skin is  warm and dry. No rash noted. He is not diaphoretic.   Psychiatric: He has a normal mood and affect. Judgment and thought content normal.       Significant Labs:   CMP   Recent Labs   Lab 10/20/18  0404 10/21/18  0352    136   K 3.8 3.5   CL 96 98   CO2 29 28   GLU 91 104   BUN 33* 28*   CREATININE 1.1 1.0   CALCIUM 9.3 9.0   ANIONGAP 11 10   ESTGFRAFRICA >60.0 >60.0   EGFRNONAA >60.0 >60.0   , CBC   Recent Labs   Lab 10/20/18  0404 10/21/18  1022   WBC 4.65 4.41   HGB 10.4* 9.9*   HCT 32.8* 31.9*    132*   , INR No results for input(s): INR, PROTIME in the last 48 hours. and Troponin   Recent Labs   Lab 10/20/18  1756   TROPONINI 0.203*       Significant Imaging:   ECHO:   On review the waveforms for mitral flow are not-consistent. Difficult to determine an accurate gradient.    CXR: LLL infiltrate noted

## 2018-10-22 NOTE — ASSESSMENT & PLAN NOTE
Based on reported valve area, this is a mild MS, based on gradients it is moderate. On review of outside data a recent NITA preliminary report suggested severe MR, but as recently as June (2 months earlier) the MR was mild to moderate with a gradient of 6.4. The atrial flutter complicates assessment due to variability in the pulse-wave and continuous waveforms.    His poor response to diuresis, and infiltrate may suggest an early sepsis picture due to pneumonia, and his medication non-compliance makes him high risk for complications from rhythm control strategies.    - recommend gentle diuresis  - recommend starting empiric antibiotics for what may be an HCAP (infiltrate with cough)  - Would switch nitrate/hydralazine combo to ARB (better compliance and better clinical effect)

## 2018-10-22 NOTE — ASSESSMENT & PLAN NOTE
Atrial flutter, rate controlled, heart failure exacerbation due to medication non-compliance. Given his alcohol abuse and medication non-compliance, patient is at high-risk for NITA/DCCV due to risk of stroke from risk of medication non-compliance after the procedure.     - continue heparin gtt  - will defer decision for NITA until medication compliance can be established

## 2018-10-22 NOTE — SUBJECTIVE & OBJECTIVE
Interval History: Feels weak, improving shortness of breath    Tele: A-sensed V-paced HR , occasional PVC    ROS  Objective:     Vital Signs (Most Recent):  Temp: 98.4 °F (36.9 °C) (10/23/18 1522)  Pulse: 96 (10/23/18 2000)  Resp: 18 (10/23/18 2000)  BP: 91/64 (10/23/18 1522)  SpO2: 99 % (10/23/18 2000) Vital Signs (24h Range):  Temp:  [97.8 °F (36.6 °C)-98.7 °F (37.1 °C)] 98.4 °F (36.9 °C)  Pulse:  [] 96  Resp:  [17-18] 18  SpO2:  [94 %-99 %] 99 %  BP: ()/(64-80) 91/64     Weight: 109.5 kg (241 lb 6.5 oz)  Body mass index is 34.64 kg/m².     SpO2: 95 %  O2 Device (Oxygen Therapy): room air    Physical Exam   Constitutional: He is oriented to person, place, and time. He appears well-developed and well-nourished. No distress.   HENT:   Head: Normocephalic and atraumatic.   Mouth/Throat: Oropharynx is clear and moist.   Eyes: Conjunctivae and EOM are normal. Pupils are equal, round, and reactive to light. No scleral icterus.   Neck: Normal range of motion. Neck supple. JVD: difficult to discern JVD.   Cardiovascular: Regular rhythm and intact distal pulses. Tachycardia present.   Murmur heard.  Pulses:       Radial pulses are 2+ on the right side, and 2+ on the left side.   Pulmonary/Chest: Effort normal and breath sounds normal. No respiratory distress.   Symmetrical expansion, mild rales at the bases   Abdominal: Soft. Bowel sounds are normal. There is no hepatosplenomegaly. There is no tenderness.   Musculoskeletal: Normal range of motion. He exhibits no edema.   Bilateral edema 1-2+, with decreased skin turgor   Neurological: He is alert and oriented to person, place, and time. No cranial nerve deficit.   Skin: Skin is warm and dry. No rash noted. He is not diaphoretic.   Psychiatric: He has a normal mood and affect. Judgment and thought content normal.       Significant Labs:   EP:   Recent Labs   Lab 10/22/18  0357 10/23/18  0502    135*   K 3.6 3.7   CL 96 98   CO2 31* 28   GLU 84 89    BUN 27* 25*   CREATININE 1.3 1.0   CALCIUM 9.0 9.2   ANIONGAP 9 9   ESTGFRAFRICA >60.0 >60.0   EGFRNONAA 58.1* >60.0   WBC 4.77 4.20   HGB 11.1* 9.8*   HCT 36.5* 30.8*   * 125*

## 2018-10-22 NOTE — PLAN OF CARE
"Hospital Medicine Plan of Care    Called by RN for 10/10 chest pain radiating to the L arm    Patient evaluated at bedside. Patient noted to me that prior to admission, he suffered a mechanical fall (slipped on some grease while cooking ) and injured the upper portion on his sternum. Described pain as sharp, slightly radiating to his L chest/ arm.     BP (!) 89/66 (BP Location: Right arm, Patient Position: Lying)   Pulse 93   Temp 97.9 °F (36.6 °C) (Oral)   Resp 20   Ht 5' 10" (1.778 m)   Wt 109.6 kg (241 lb 10 oz)   SpO2 98%   BMI 34.67 kg/m²   No acute distress on PE  Chest: RRR with no murmurs, more tender to palpation of the sternum  Lungs: CTAB    Plan:   - Received PO tylenol and pain decreased to 5/10. - CXR stable with no acute changes  - EKG V paced, stable from prior  - trop 0.183 <--- from 0.203  - CKMB negative  - Held nitro as BP was low (but pt is asymptomatic with BP). Pain is likely musculoskeletal given mechanical trauma to the sternum and tenderness to palpation on PE. Concern for possible fracture that is not seen on regular CXR  - pain control - tramadol added for severe  - repeat trop with AM labs  - may consider Rib Xray for further eval      Signing Physician:     Eden Velarde MD  Department of Hospital Medicine   [facility]  Pager [phone]  Spectra [phone]  10/21/2018      "

## 2018-10-22 NOTE — PROGRESS NOTES
Notified NP that patient's urine is tea colored and much darker than yesterdays. NP stated that they are aware to continue to monitor.

## 2018-10-22 NOTE — PROGRESS NOTES
10/21/18 1923   Vital Signs   BP (!) 89/66   MAP (mmHg) 73     Dr. fox notified. Verbalized  to hold BP meds for tonight. Isosorbide and hydralazine.

## 2018-10-22 NOTE — CONSULTS
[facility]  Cardiology  Consult Note    Patient Name: Quentin Mckeon  MRN: [id number]  Admission Date: 10/19/2018  Hospital Length of Stay: 2 days  Code Status: Full Code   Attending Provider: Justin Robles MD   Consulting Provider: John Paul Isabel MD  Primary Care Physician: Primary Doctor No  Principal Problem:Acute on chronic systolic heart failure    Patient information was obtained from patient, past medical records and ER records.     Inpatient consult to Cardiology  Consult performed by: John Paul Isabel MD  Consult ordered by: Chaya Flores NP        Subjective:     Chief Complaint:  Shortness of breath     HPI:   We are being consulted for assistance in managing this 64yo non-compliant AA male with a history ICM (EF 35%) with BiV ICD due to CAD s/p CABG in 2014 with MV repair, VF/VT s/p BiV AICD (extraction and reimplantation 2/2018 with some lead retention d/t inability to remove as it's stuck under clavicle), AF on Xarelto, and extensive alcohol and tobacco abuse.     He initially presented to [facility] with SOB and chest pain for 3 days, was admitted for exacerbation of CHF due to dietary and medication non-compliance. He was transferred here for evaluation of replacement of his mitral valve and due to suspicion of lead crush injury following his most recent fall.     After arriving, EP evaluated and found him to be in atrial flutter with controlled response on beta blocker. The night of admission he became stuporous and stroke code was called, CTA was negative, and EEG showed slow waves suggestive of encephalopathy and no signs of seizure-activity.    Since arrival he has been sensitive to diuresis and afterload reduction. He is complaining of cough, and there is a possible LLL infiltrate on CXR. Otherwise he states he feels less short of breath than when he came into the ED.    He confirms he has been non-compliant with medications since May and drinks  heavily.        History reviewed. No pertinent past medical history.    Past Surgical History:   Procedure Laterality Date    EXTRACTION-LEAD N/A 2/12/2018    Performed by Jose Fitzgerald MD at [facility] OR 2ND FLR    EXTRACTION-LEAD N/A 2/12/2018    Performed by Jose Fitzgerald MD at [facility] CATH LAB    INSERTION-ICD-BIVENTRICULAR N/A 2/12/2018    Performed by Jose Fitzgerald MD at [facility] CATH LAB       Review of patient's allergies indicates:  No Known Allergies    No current facility-administered medications on file prior to encounter.      Current Outpatient Medications on File Prior to Encounter   Medication Sig    albuterol (PROAIR HFA) 90 mcg/actuation inhaler Inhale 2 puffs into the lungs every 6 (six) hours as needed for Wheezing. Rescue    atorvastatin (LIPITOR) 40 MG tablet Take 40 mg by mouth once daily.    budesonide-formoterol 160-4.5 mcg (SYMBICORT) 160-4.5 mcg/actuation HFAA Inhale 2 puffs into the lungs every 12 (twelve) hours. Controller    furosemide (LASIX) 40 MG tablet Take 1 tablet (40 mg total) by mouth 2 (two) times daily. (Patient taking differently: Take 80 mg by mouth 2 (two) times daily. )    hydrALAZINE (APRESOLINE) 25 MG tablet Take 1 tablet (25 mg total) by mouth 3 (three) times daily. Do not take this medicine until you see your PCP in 1 week    isosorbide mononitrate (IMDUR) 30 MG 24 hr tablet Take 1 tablet (30 mg total) by mouth once daily.    magnesium oxide (MAG-OX) 400 mg (241.3 mg magnesium) tablet Take 400 mg by mouth once daily.    metOLazone (ZAROXOLYN) 5 MG tablet Take 5 mg by mouth as needed. Twice weekly as needed    metoprolol succinate (TOPROL-XL) 25 MG 24 hr tablet Take 12.5 mg by mouth once daily.    potassium chloride (KLOR-CON) 10 MEQ TbSR Take 20 mEq by mouth 3 (three) times daily. 2  Caps tid    ranolazine (RANEXA) 500 MG Tb12 Take 500 mg by mouth 2 (two) times daily.    rivaroxaban (XARELTO) 20 mg Tab Take 1 tablet (20 mg total) by mouth once  daily. Do not take this medication for 5 days after procedure (restart on 2/18/2018) (Patient taking differently: Take 20 mg by mouth once daily. )    sacubitril-valsartan (ENTRESTO) 49-51 mg per tablet Take 1 tablet by mouth 2 (two) times daily.    acetaminophen (TYLENOL) 325 MG tablet Take 2 tablets (650 mg total) by mouth every 4 (four) hours as needed.    amiodarone (PACERONE) 200 MG Tab Take 1 tablet (200 mg total) by mouth 2 (two) times daily. Take 200mg twice a day until seen by Dr. Fitzgerald in clinic (Patient taking differently: Take 200 mg by mouth once daily. Take 200mg a day at home not listed in transfer med list, though he said he took it yesterday)    nitroGLYCERIN (NITROSTAT) 0.4 MG SL tablet Place 0.4 mg under the tongue every 5 (five) minutes as needed for Chest pain.     Family History     None        Tobacco Use    Smoking status: Current Every Day Smoker     Packs/day: 0.25     Years: 50.00     Pack years: 12.50    Smokeless tobacco: Current User     Types: Snuff   Substance and Sexual Activity    Alcohol use: No    Drug use: No    Sexual activity: Not on file     Review of Systems   Constitution: Negative for chills, fever, weakness and weight gain.   HENT: Negative for congestion.         Neck pain   Eyes: Negative for visual disturbance.   Cardiovascular: Positive for leg swelling (improving). Negative for chest pain, claudication, dyspnea on exertion, orthopnea, palpitations and syncope.   Respiratory: Positive for shortness of breath. Negative for cough and snoring.    Hematologic/Lymphatic: Does not bruise/bleed easily.   Skin: Negative for rash.   Musculoskeletal: Negative for muscle cramps and myalgias.   Gastrointestinal: Negative for bloating, abdominal pain, constipation, diarrhea and melena.   Genitourinary: Negative for bladder incontinence.   Neurological: Negative for excessive daytime sleepiness and focal weakness.   Psychiatric/Behavioral: Negative for depression and  suicidal ideas.     Objective:     Vital Signs (Most Recent):  Temp: 97.9 °F (36.6 °C) (10/21/18 1923)  Pulse: 96 (10/21/18 1946)  Resp: 20 (10/21/18 1946)  BP: (!) 89/66 (10/21/18 1923)  SpO2: 98 % (10/21/18 1946) Vital Signs (24h Range):  Temp:  [96.2 °F (35.7 °C)-98.9 °F (37.2 °C)] 97.9 °F (36.6 °C)  Pulse:  [] 96  Resp:  [16-20] 20  SpO2:  [92 %-99 %] 98 %  BP: ()/(66-76) 89/66     Weight: 109.6 kg (241 lb 10 oz)  Body mass index is 34.67 kg/m².    SpO2: 98 %  O2 Device (Oxygen Therapy): nasal cannula      Intake/Output Summary (Last 24 hours) at 10/21/2018 2203  Last data filed at 10/21/2018 1800  Gross per 24 hour   Intake 837.6 ml   Output 1675 ml   Net -837.4 ml       Lines/Drains/Airways     Peripheral Intravenous Line                 Peripheral IV - Single Lumen 10/19/18 0800 Left Antecubital 2 days         Peripheral IV - Single Lumen 10/19/18 0800 Right Wrist 2 days                Physical Exam   Constitutional: He is oriented to person, place, and time. He appears well-developed and well-nourished. No distress.   HENT:   Head: Normocephalic and atraumatic.   Mouth/Throat: Oropharynx is clear and moist.   Eyes: Conjunctivae and EOM are normal. Pupils are equal, round, and reactive to light. No scleral icterus.   Neck: Normal range of motion. Neck supple. JVD: difficult to discern JVD.   Cardiovascular: Regular rhythm and intact distal pulses. Tachycardia present.   Murmur heard.  Pulses:       Radial pulses are 2+ on the right side, and 2+ on the left side.   Pulmonary/Chest: Effort normal and breath sounds normal. No respiratory distress.   Symmetrical expansion, mild rales at the bases   Abdominal: Soft. Bowel sounds are normal. There is no hepatosplenomegaly. There is no tenderness.   Musculoskeletal: Normal range of motion. He exhibits no edema.   Bilateral edema 1-2+, with decreased skin turgor   Neurological: He is alert and oriented to person, place, and time. No cranial nerve deficit.    Skin: Skin is warm and dry. No rash noted. He is not diaphoretic.   Psychiatric: He has a normal mood and affect. Judgment and thought content normal.       Significant Labs:   CMP   Recent Labs   Lab 10/20/18  0404 10/21/18  0352    136   K 3.8 3.5   CL 96 98   CO2 29 28   GLU 91 104   BUN 33* 28*   CREATININE 1.1 1.0   CALCIUM 9.3 9.0   ANIONGAP 11 10   ESTGFRAFRICA >60.0 >60.0   EGFRNONAA >60.0 >60.0   , CBC   Recent Labs   Lab 10/20/18  0404 10/21/18  1022   WBC 4.65 4.41   HGB 10.4* 9.9*   HCT 32.8* 31.9*    132*   , INR No results for input(s): INR, PROTIME in the last 48 hours. and Troponin   Recent Labs   Lab 10/20/18  1756   TROPONINI 0.203*       Significant Imaging:   ECHO:   On review the waveforms for mitral flow are not-consistent. Difficult to determine an accurate gradient.    CXR: LLL infiltrate noted    Assessment and Plan:     * Acute on chronic systolic heart failure    Based on reported valve area, this is a mild MS, based on gradients it is moderate. On review of outside data a recent NITA preliminary report suggested severe MR, but as recently as June (2 months earlier) the MR was mild to moderate with a gradient of 6.4. The atrial flutter complicates assessment due to variability in the pulse-wave and continuous waveforms.    His poor response to diuresis, and infiltrate may suggest an early sepsis picture due to pneumonia, and his medication non-compliance makes him high risk for complications from rhythm control strategies.    - recommend gentle diuresis  - recommend starting empiric antibiotics for what may be an HCAP (infiltrate with cough)  - Would switch nitrate/hydralazine combo to ARB (better compliance and better clinical effect)     Atrial flutter    Atrial flutter with intermittent RVR, currently controlled on beta blocker (YJCBT2J = 4; CHF, HTN, TIA). May be exacerbated by combination of HF and possible infectious process. Needs continued anti-coagulation. Given  absence of mechanical valve and rheumatic valvular disease at this time, ok to use NOAC.    - transition heparin to xarelto (once daily medication may help improve compliance)\  - continue lopressor as tolerated by BP  - would defer decicion on NITA/DCCV to EP service     Mitral valve stenosis    Based on records review and review of images, the stenosis may not be severe. Am unsure that this is the cause of the heart failure exacerbation as much as his lifestyle and medication non-compliance.    - based on data available, would not recommend MVR         VTE Risk Mitigation (From admission, onward)        Ordered     heparin 25,000 units in dextrose 5% 250 mL (100 units/mL) infusion LOW INTENSITY nomogram - [facility]  Continuous      10/19/18 1925     heparin 25,000 units in dextrose 5% (100 units/ml) IV bolus from bag - ADDITIONAL PRN BOLUS - 60 units/kg  As needed (PRN)      10/19/18 1925     heparin 25,000 units in dextrose 5% (100 units/ml) IV bolus from bag - ADDITIONAL PRN BOLUS - 30 units/kg  As needed (PRN)      10/19/18 1925          Thank you for your consult. I will follow-up with patient. Please contact us if you have any additional questions.    John Paul Isabel MD  Cardiology   [facility]

## 2018-10-22 NOTE — ASSESSMENT & PLAN NOTE
Atrial flutter with intermittent RVR, currently controlled on beta blocker (HOYHT6H = 4; CHF, HTN, TIA). May be exacerbated by combination of HF and possible infectious process. Needs continued anti-coagulation. Given absence of mechanical valve and rheumatic valvular disease at this time, ok to use NOAC.    - transition heparin to xarelto (once daily medication may help improve compliance)\  - continue lopressor as tolerated by BP  - would defer decicion on NITA/DCCV to EP service

## 2018-10-22 NOTE — SUBJECTIVE & OBJECTIVE
Interval History: No events overnight    Tele: A-sensed, V-paced, HR 70-90    ROS  Objective:     Vital Signs (Most Recent):  Temp: 97.9 °F (36.6 °C) (10/21/18 1923)  Pulse: 96 (10/21/18 1946)  Resp: 20 (10/21/18 1946)  BP: (!) 89/66 (10/21/18 1923)  SpO2: 98 % (10/21/18 1946) Vital Signs (24h Range):  Temp:  [96.2 °F (35.7 °C)-98.9 °F (37.2 °C)] 97.9 °F (36.6 °C)  Pulse:  [] 96  Resp:  [16-20] 20  SpO2:  [92 %-99 %] 98 %  BP: ()/(66-76) 89/66     Weight: 109.6 kg (241 lb 10 oz)  Body mass index is 34.67 kg/m².     SpO2: 98 %  O2 Device (Oxygen Therapy): nasal cannula    Physical Exam   Constitutional: He appears well-developed and well-nourished. No distress.   Alert and oriented with good insight   HENT:   Head: Normocephalic and atraumatic.   Mouth/Throat: Oropharynx is clear and moist.   Eyes: Conjunctivae and EOM are normal. Pupils are equal, round, and reactive to light. No scleral icterus.   Neck: Normal range of motion. Neck supple. No JVD present.   Cardiovascular: Regular rhythm, normal heart sounds and intact distal pulses. Tachycardia present.   No murmur heard.  Pulses:       Radial pulses are 2+ on the right side, and 2+ on the left side.   Pulmonary/Chest: Effort normal and breath sounds normal. No respiratory distress.   Symmetrical expansion   Abdominal: Soft. Bowel sounds are normal. There is no hepatosplenomegaly. There is no tenderness.   Musculoskeletal: Normal range of motion. He exhibits no edema.   Neurological: He is alert.   Moves all extremities   Skin: Skin is warm and dry. No rash noted. He is not diaphoretic.       Significant Labs:   EP:   Recent Labs   Lab 10/19/18  2113 10/20/18  0404 10/21/18  0352 10/21/18  1022   NA  --  136 136  --    K  --  3.8 3.5  --    CL  --  96 98  --    CO2  --  29 28  --    GLU  --  91 104  --    BUN  --  33* 28*  --    CREATININE  --  1.1 1.0  --    CALCIUM  --  9.3 9.0  --    ANIONGAP  --  11 10  --    ESTGFRAFRICA  --  >60.0 >60.0  --     EGFRNONAA  --  >60.0 >60.0  --    WBC 4.79 4.65  --  4.41   HGB 11.3* 10.4*  --  9.9*   HCT 35.4* 32.8*  --  31.9*    153  --  132*   INR 1.4*  --   --   --

## 2018-10-22 NOTE — NURSING TRANSFER
Nursing Transfer Note      10/22/2018     Transfer From: Xray    Transfer via stretcher    Transfer with cardiac monitoring    Transported by transport    Medicines sent: heparin    Chart send with patient: No

## 2018-10-22 NOTE — PROGRESS NOTES
Notified Team that Pt's urine is Darker than it has been; dark mary color. Left urine for team to look at.

## 2018-10-22 NOTE — PLAN OF CARE
Problem: Patient Care Overview  Goal: Plan of Care Review  Outcome: Ongoing (interventions implemented as appropriate)  Pt free of falls/trauma/injuries.  Denies c/o SOB or discomfort.  Generalized skin remains CDI; generalized edema noted. Lasix has been D/C.   Wt increased since yesterday. Pt started on Vancomycin q12hr and Rocephin q24hr for lower respiratory infection; infiltrates seen on CXR in LLL. Pt complained of having CP;  Notified; STAT EKG, Troponin and lactate levels, and CXR ordered. Fall bundle in place. Npo for possible NITA. Pt tolerating plan of care.

## 2018-10-22 NOTE — ASSESSMENT & PLAN NOTE
Based on records review and review of images, the stenosis may not be severe. Am unsure that this is the cause of the heart failure exacerbation as much as his lifestyle and medication non-compliance.    - based on data available, would not recommend MVR

## 2018-10-23 ENCOUNTER — ANESTHESIA EVENT (OUTPATIENT)
Dept: MEDSURG UNIT | Facility: HOSPITAL | Age: 63
DRG: 314 | End: 2018-10-23
Payer: MEDICARE

## 2018-10-23 LAB
ANION GAP SERPL CALC-SCNC: 9 MMOL/L
APTT BLDCRRT: 44 SEC
BASOPHILS # BLD AUTO: 0.01 K/UL
BASOPHILS NFR BLD: 0.2 %
BUN SERPL-MCNC: 25 MG/DL
CALCIUM SERPL-MCNC: 9.2 MG/DL
CHLORIDE SERPL-SCNC: 98 MMOL/L
CO2 SERPL-SCNC: 28 MMOL/L
CREAT SERPL-MCNC: 1 MG/DL
DIFFERENTIAL METHOD: ABNORMAL
EOSINOPHIL # BLD AUTO: 0.1 K/UL
EOSINOPHIL NFR BLD: 1.7 %
ERYTHROCYTE [DISTWIDTH] IN BLOOD BY AUTOMATED COUNT: 16.8 %
EST. GFR  (AFRICAN AMERICAN): >60 ML/MIN/1.73 M^2
EST. GFR  (NON AFRICAN AMERICAN): >60 ML/MIN/1.73 M^2
GLUCOSE SERPL-MCNC: 89 MG/DL
HCT VFR BLD AUTO: 30.8 %
HGB BLD-MCNC: 9.8 G/DL
IMM GRANULOCYTES # BLD AUTO: 0.02 K/UL
IMM GRANULOCYTES NFR BLD AUTO: 0.5 %
LYMPHOCYTES # BLD AUTO: 1.5 K/UL
LYMPHOCYTES NFR BLD: 35.5 %
MAGNESIUM SERPL-MCNC: 1.7 MG/DL
MCH RBC QN AUTO: 31.9 PG
MCHC RBC AUTO-ENTMCNC: 31.8 G/DL
MCV RBC AUTO: 100 FL
MONOCYTES # BLD AUTO: 0.7 K/UL
MONOCYTES NFR BLD: 16.9 %
NEUTROPHILS # BLD AUTO: 1.9 K/UL
NEUTROPHILS NFR BLD: 45.2 %
NRBC BLD-RTO: 0 /100 WBC
PLATELET # BLD AUTO: 125 K/UL
PMV BLD AUTO: 11.2 FL
POTASSIUM SERPL-SCNC: 3.7 MMOL/L
RBC # BLD AUTO: 3.07 M/UL
SODIUM SERPL-SCNC: 135 MMOL/L
VANCOMYCIN TROUGH SERPL-MCNC: 16.4 UG/ML
WBC # BLD AUTO: 4.2 K/UL

## 2018-10-23 PROCEDURE — 25000003 PHARM REV CODE 250: Performed by: NURSE PRACTITIONER

## 2018-10-23 PROCEDURE — 63600175 PHARM REV CODE 636 W HCPCS: Performed by: HOSPITALIST

## 2018-10-23 PROCEDURE — 83735 ASSAY OF MAGNESIUM: CPT

## 2018-10-23 PROCEDURE — G8978 MOBILITY CURRENT STATUS: HCPCS | Mod: CJ

## 2018-10-23 PROCEDURE — 99233 SBSQ HOSP IP/OBS HIGH 50: CPT | Mod: ,,, | Performed by: NURSE PRACTITIONER

## 2018-10-23 PROCEDURE — 80048 BASIC METABOLIC PNL TOTAL CA: CPT

## 2018-10-23 PROCEDURE — 94640 AIRWAY INHALATION TREATMENT: CPT

## 2018-10-23 PROCEDURE — 20600001 HC STEP DOWN PRIVATE ROOM

## 2018-10-23 PROCEDURE — 63600175 PHARM REV CODE 636 W HCPCS: Performed by: STUDENT IN AN ORGANIZED HEALTH CARE EDUCATION/TRAINING PROGRAM

## 2018-10-23 PROCEDURE — 93005 ELECTROCARDIOGRAM TRACING: CPT

## 2018-10-23 PROCEDURE — 36415 COLL VENOUS BLD VENIPUNCTURE: CPT

## 2018-10-23 PROCEDURE — 97161 PT EVAL LOW COMPLEX 20 MIN: CPT

## 2018-10-23 PROCEDURE — 94761 N-INVAS EAR/PLS OXIMETRY MLT: CPT

## 2018-10-23 PROCEDURE — 97116 GAIT TRAINING THERAPY: CPT

## 2018-10-23 PROCEDURE — 63600175 PHARM REV CODE 636 W HCPCS: Performed by: NURSE PRACTITIONER

## 2018-10-23 PROCEDURE — 85730 THROMBOPLASTIN TIME PARTIAL: CPT

## 2018-10-23 PROCEDURE — 80202 ASSAY OF VANCOMYCIN: CPT

## 2018-10-23 PROCEDURE — 25000003 PHARM REV CODE 250: Performed by: STUDENT IN AN ORGANIZED HEALTH CARE EDUCATION/TRAINING PROGRAM

## 2018-10-23 PROCEDURE — G8979 MOBILITY GOAL STATUS: HCPCS | Mod: CI

## 2018-10-23 PROCEDURE — 85025 COMPLETE CBC W/AUTO DIFF WBC: CPT

## 2018-10-23 PROCEDURE — G8980 MOBILITY D/C STATUS: HCPCS | Mod: CJ

## 2018-10-23 PROCEDURE — 99233 SBSQ HOSP IP/OBS HIGH 50: CPT | Mod: ,,, | Performed by: PSYCHIATRY & NEUROLOGY

## 2018-10-23 PROCEDURE — 93010 ELECTROCARDIOGRAM REPORT: CPT | Mod: ,,, | Performed by: INTERNAL MEDICINE

## 2018-10-23 PROCEDURE — S4991 NICOTINE PATCH NONLEGEND: HCPCS | Performed by: NURSE PRACTITIONER

## 2018-10-23 PROCEDURE — 25000242 PHARM REV CODE 250 ALT 637 W/ HCPCS: Performed by: NURSE PRACTITIONER

## 2018-10-23 PROCEDURE — 25000003 PHARM REV CODE 250: Performed by: HOSPITALIST

## 2018-10-23 RX ORDER — POTASSIUM CHLORIDE 20 MEQ/1
40 TABLET, EXTENDED RELEASE ORAL ONCE
Status: COMPLETED | OUTPATIENT
Start: 2018-10-23 | End: 2018-10-23

## 2018-10-23 RX ORDER — METOPROLOL TARTRATE 25 MG/1
25 TABLET, FILM COATED ORAL 3 TIMES DAILY
Status: DISCONTINUED | OUTPATIENT
Start: 2018-10-23 | End: 2018-10-26 | Stop reason: HOSPADM

## 2018-10-23 RX ORDER — MAGNESIUM SULFATE HEPTAHYDRATE 40 MG/ML
2 INJECTION, SOLUTION INTRAVENOUS ONCE
Status: COMPLETED | OUTPATIENT
Start: 2018-10-23 | End: 2018-10-23

## 2018-10-23 RX ORDER — FUROSEMIDE 10 MG/ML
40 INJECTION INTRAMUSCULAR; INTRAVENOUS 2 TIMES DAILY
Status: DISCONTINUED | OUTPATIENT
Start: 2018-10-23 | End: 2018-10-24

## 2018-10-23 RX ADMIN — IPRATROPIUM BROMIDE AND ALBUTEROL SULFATE 3 ML: .5; 3 SOLUTION RESPIRATORY (INHALATION) at 01:10

## 2018-10-23 RX ADMIN — RANOLAZINE 500 MG: 500 TABLET, FILM COATED, EXTENDED RELEASE ORAL at 09:10

## 2018-10-23 RX ADMIN — METOPROLOL TARTRATE 25 MG: 25 TABLET ORAL at 03:10

## 2018-10-23 RX ADMIN — HEPARIN SODIUM AND DEXTROSE 17 UNITS/KG/HR: 10000; 5 INJECTION INTRAVENOUS at 07:10

## 2018-10-23 RX ADMIN — MAGNESIUM SULFATE IN WATER 2 G: 40 INJECTION, SOLUTION INTRAVENOUS at 09:10

## 2018-10-23 RX ADMIN — ATORVASTATIN CALCIUM 40 MG: 20 TABLET, FILM COATED ORAL at 09:10

## 2018-10-23 RX ADMIN — LOSARTAN POTASSIUM 12.5 MG: 25 TABLET, FILM COATED ORAL at 09:10

## 2018-10-23 RX ADMIN — DIGOXIN 125 MCG: 0.25 INJECTION INTRAMUSCULAR; INTRAVENOUS at 03:10

## 2018-10-23 RX ADMIN — IPRATROPIUM BROMIDE AND ALBUTEROL SULFATE 3 ML: .5; 3 SOLUTION RESPIRATORY (INHALATION) at 07:10

## 2018-10-23 RX ADMIN — VANCOMYCIN HYDROCHLORIDE 1500 MG: 10 INJECTION, POWDER, LYOPHILIZED, FOR SOLUTION INTRAVENOUS at 10:10

## 2018-10-23 RX ADMIN — TRAMADOL HYDROCHLORIDE 50 MG: 50 TABLET, FILM COATED ORAL at 01:10

## 2018-10-23 RX ADMIN — DIGOXIN 125 MCG: 0.25 INJECTION INTRAMUSCULAR; INTRAVENOUS at 11:10

## 2018-10-23 RX ADMIN — NICOTINE 1 PATCH: 21 PATCH, EXTENDED RELEASE TRANSDERMAL at 09:10

## 2018-10-23 RX ADMIN — SENNOSIDES AND DOCUSATE SODIUM 1 TABLET: 8.6; 5 TABLET ORAL at 09:10

## 2018-10-23 RX ADMIN — FUROSEMIDE 40 MG: 10 INJECTION, SOLUTION INTRAMUSCULAR; INTRAVENOUS at 06:10

## 2018-10-23 RX ADMIN — IPRATROPIUM BROMIDE AND ALBUTEROL SULFATE 3 ML: .5; 3 SOLUTION RESPIRATORY (INHALATION) at 08:10

## 2018-10-23 RX ADMIN — FLUTICASONE FUROATE AND VILANTEROL TRIFENATATE 1 PUFF: 100; 25 POWDER RESPIRATORY (INHALATION) at 09:10

## 2018-10-23 RX ADMIN — METOPROLOL TARTRATE 12.5 MG: 25 TABLET, FILM COATED ORAL at 09:10

## 2018-10-23 RX ADMIN — POTASSIUM CHLORIDE 40 MEQ: 1500 TABLET, EXTENDED RELEASE ORAL at 09:10

## 2018-10-23 RX ADMIN — FUROSEMIDE 40 MG: 10 INJECTION, SOLUTION INTRAMUSCULAR; INTRAVENOUS at 11:10

## 2018-10-23 RX ADMIN — RIVAROXABAN 20 MG: 20 TABLET, FILM COATED ORAL at 06:10

## 2018-10-23 RX ADMIN — METOPROLOL TARTRATE 25 MG: 25 TABLET ORAL at 10:10

## 2018-10-23 NOTE — SUBJECTIVE & OBJECTIVE
Interval History:  Doing well back to baseline neurologically, no chest pain or SOB.     Review of Systems   Constitution: Negative for chills, decreased appetite and diaphoresis.   HENT: Negative for congestion and ear discharge.    Eyes: Negative for blurred vision and discharge.   Cardiovascular: Negative for chest pain, dyspnea on exertion, irregular heartbeat, leg swelling and paroxysmal nocturnal dyspnea.   Respiratory: Negative for cough, hemoptysis and shortness of breath.    Gastrointestinal: Negative for abdominal pain.     Objective:     Vital Signs (Most Recent):  Temp: 98.5 °F (36.9 °C) (10/22/18 1958)  Pulse: 94 (10/22/18 1958)  Resp: 18 (10/22/18 1958)  BP: 103/67 (10/22/18 1958)  SpO2: 96 % (10/22/18 1958) Vital Signs (24h Range):  Temp:  [97.9 °F (36.6 °C)-98.7 °F (37.1 °C)] 98.5 °F (36.9 °C)  Pulse:  [] 94  Resp:  [14-20] 18  SpO2:  [95 %-100 %] 96 %  BP: ()/(61-75) 103/67     Weight: 109.5 kg (241 lb 6.5 oz)  Body mass index is 34.64 kg/m².     SpO2: 96 %  O2 Device (Oxygen Therapy): room air      Intake/Output Summary (Last 24 hours) at 10/22/2018 2103  Last data filed at 10/22/2018 1749  Gross per 24 hour   Intake 1454.87 ml   Output 1150 ml   Net 304.87 ml       Lines/Drains/Airways     Peripheral Intravenous Line                 Peripheral IV - Single Lumen 10/19/18 0800 Left Antecubital 3 days         Peripheral IV - Single Lumen 10/19/18 0800 Right Wrist 3 days                Physical Exam   Constitutional: He is oriented to person, place, and time. He appears well-developed and well-nourished. No distress.   HENT:   Head: Normocephalic and atraumatic.   Mouth/Throat: Oropharynx is clear and moist.   Eyes: Conjunctivae and EOM are normal. Pupils are equal, round, and reactive to light. No scleral icterus.   Neck: Normal range of motion. Neck supple. JVD: difficult to discern JVD.   Cardiovascular: Regular rhythm and intact distal pulses. Tachycardia present.   Murmur  heard.  Pulses:       Radial pulses are 2+ on the right side, and 2+ on the left side.   Pulmonary/Chest: Effort normal and breath sounds normal. No respiratory distress.   Symmetrical expansion, mild rales at the bases   Abdominal: Soft. Bowel sounds are normal. There is no hepatosplenomegaly. There is no tenderness.   Musculoskeletal: Normal range of motion. He exhibits no edema.   Bilateral edema 1-2+, with decreased skin turgor   Neurological: He is alert and oriented to person, place, and time. No cranial nerve deficit.   Skin: Skin is warm and dry. No rash noted. He is not diaphoretic.   Psychiatric: He has a normal mood and affect. Judgment and thought content normal.       Significant Labs:   BMP:   Recent Labs   Lab 10/21/18  0352 10/22/18  0357    84    136   K 3.5 3.6   CL 98 96   CO2 28 31*   BUN 28* 27*   CREATININE 1.0 1.3   CALCIUM 9.0 9.0   MG 1.8 1.9   , CMP   Recent Labs   Lab 10/21/18  0352 10/22/18  0357    136   K 3.5 3.6   CL 98 96   CO2 28 31*    84   BUN 28* 27*   CREATININE 1.0 1.3   CALCIUM 9.0 9.0   ANIONGAP 10 9   ESTGFRAFRICA >60.0 >60.0   EGFRNONAA >60.0 58.1*   , CBC   Recent Labs   Lab 10/21/18  1022 10/22/18  0357   WBC 4.41 4.77   HGB 9.9* 11.1*   HCT 31.9* 36.5*   * 142*   , INR No results for input(s): INR, PROTIME in the last 48 hours. and Troponin   Recent Labs   Lab 10/21/18  2145 10/22/18  0357   TROPONINI 0.183* 0.200*       Significant Imaging: Echocardiogram:   2D echo with color flow doppler:   Results for orders placed or performed during the hospital encounter of 10/19/18   2D echo with color flow doppler   Result Value Ref Range    Calculated EF 23 (A) 55 - 65    Est. PA Systolic Pressure 51.72 (A)     Tricuspid Valve Regurgitation MODERATE (A)

## 2018-10-23 NOTE — PROGRESS NOTES
[facility]  Vascular Neurology  Comprehensive Stroke Center  Progress Note    Assessment/Plan:     * Atrial flutter    Stroke risk factor  Previously anticoagulated  On heparin gtt  Management per primary team     Stroke vs seizure    62 y/o male with sudden onset of mental status change, minimal responsiveness, urinary incontinence, and left sided weakness.  Initial imaging revealed no acute findings.  Patient returned to baseline but continues with generalized weakness.  Left leg noted to be more weak than right on exam. EEG did show generalized slowing, but no seizure activity.  Patient scheduled for cardioversion then MRI.      Antithrombotics for secondary stroke prevention: Anticoagulants: Heparin gtt    Statins for secondary stroke prevention and hyperlipidemia, if present:   Statins: Atorvastatin- 40 mg daily    Aggressive risk factor modification: HTN, Smoking, HLD, A-Fib, CAD     Rehab efforts: PT/OT/SLP to evaluate and treat    Diagnostics ordered/pending: MRI head without contrast to assess brain parenchyma    VTE prophylaxis: Currently on heparin gtt; SCDs    BP parameters: Goal normotension         Ventricular tachycardia, monomorphic    Noted to have various arrhythmias while inpatient today   Seen by EP   See note regarding device interrogation      Chronic anticoagulation    Takes xarelto at home - last dose 10/18 - 21:36  At outside hospital   recevied hep vte ppx today at approximately 5 pm   Anticoagulation held for potential surgical intervention   Being treated for afib and dvt      Acute on chronic systolic heart failure    EF 20-25 on echo today   Patient with aicd   Management per primary team     HLD (hyperlipidemia)    Stroke risk factor  LDL 60.2  Atorvastatin 40 mg     Benign essential HTN    Stroke risk factor   Recommend normotensive           10/23: Remains on heparin gtt.  Plan for cardioversion at some point.  Recommending MRI after cardioversion.    STROKE  DOCUMENTATION   Acute Stroke Times   Last Known Normal Date: 10/19/18  Last Known Normal Time: 1755  Symptom Onset Date: 10/19/18  Symptom Onset Time: 1755  Stroke Team Called Date: 10/19/18  Stroke Team Called Time: 1807  Stroke Team Arrival Date: 10/19/18  Stroke Team Arrival Time: 1809  CT Interpretation Time: 1845    NIH Scale:  1a. Level Of Consciousness: 0-->Alert: keenly responsive  1b. LOC Questions: 0-->Answers both questions correctly  1c. LOC Commands: 0-->Performs both tasks correctly  2. Best Gaze: 0-->Normal  3. Visual: 0-->No visual loss  4. Facial Palsy: 0-->Normal symmetrical movements  5a. Motor Arm, Left: 0-->No drift: limb holds 90 (or 45) degrees for full 10 secs  5b. Motor Arm, Right: 0-->No drift: limb holds 90 (or 45) degrees for full 10 secs  6a. Motor Leg, Left: 0-->No drift: leg holds 30 degree position for full 5 secs  6b. Motor Leg, Right: 1-->Drift: leg falls by the end of the 5-sec period but does not hit bed  7. Limb Ataxia: 0-->Absent  8. Sensory: 0-->Normal: no sensory loss  9. Best Language: 0-->No aphasia: normal  10. Dysarthria: 0-->Normal  11. Extinction and Inattention (formerly Neglect): 0-->No abnormality  Total (NIH Stroke Scale): 1       Modified [place]    [place] Coma Scale:    ABCD2 Score:    [id number] Score:   HAS -BLED Score:   ICH Score:   Hunt & Pichardo Classification:      Hemorrhagic change of an Ischemic Stroke: Does this patient have an ischemic stroke with hemorrhagic changes? No     Neurologic Chief Complaint: Mental status changes    Subjective:     Interval History: Patient is seen for follow-up neurological assessment and treatment recommendations:  Remains on heparin gtt.  Plan for cardioversion at some point.  Recommending MRI after cardioversion.  Generalized slowing noted on EEG 10/20. No further episodes of incontinence, or mental status changes.    HPI, Past Medical, Family, and Social History remains the same as documented in the initial encounter.      Review of Systems   Constitutional: Negative for fever.   HENT: Negative for trouble swallowing.    Eyes: Negative for visual disturbance.   Neurological: Positive for weakness. Negative for facial asymmetry, speech difficulty and numbness.   Psychiatric/Behavioral: Negative for agitation.     Scheduled Meds:   albuterol-ipratropium  3 mL Nebulization Q6H WAKE    atorvastatin  40 mg Oral QHS    [START ON 10/24/2018] digoxin  0.125 mg Oral Daily    fluticasone-vilanterol  1 puff Inhalation Daily    furosemide  40 mg Intravenous BID    losartan  12.5 mg Oral Daily    metoprolol tartrate  25 mg Oral TID    nicotine  1 patch Transdermal Daily    ranolazine  500 mg Oral BID    rivaroxaban  20 mg Oral with dinner    senna-docusate 8.6-50 mg  1 tablet Oral BID     Continuous Infusions:  PRN Meds:acetaminophen, albuterol, dextrose 50%, dextrose 50%, GI cocktail (mylanta 30 mL, lidocaine 2 % viscous 10 mL, dicyclomine 10 mL) 50 mL, glucagon (human recombinant), glucose, glucose, heparin (PORCINE), heparin (PORCINE), nitroGLYCERIN, ondansetron, sodium chloride 0.9%, traMADol    Objective:     Vital Signs (Most Recent):  Temp: 98.4 °F (36.9 °C) (10/23/18 1522)  Pulse: 99 (10/23/18 1522)  Resp: 18 (10/23/18 1522)  BP: 91/64 (10/23/18 1522)  SpO2: 95 % (10/23/18 1522)  BP Location: Left arm    Vital Signs Range (Last 24H):  Temp:  [97.8 °F (36.6 °C)-98.7 °F (37.1 °C)]   Pulse:  []   Resp:  [17-18]   BP: ()/(64-80)   SpO2:  [94 %-99 %]   BP Location: Left arm    Physical Exam   Constitutional: He appears well-developed and well-nourished. No distress.   Pulmonary/Chest: Effort normal.   Skin: Skin is warm and dry.   Psychiatric: He has a normal mood and affect. His behavior is normal. Judgment and thought content normal.       Neurological Exam:   LOC: alert  Attention Span: Good   Language: No aphasia  Articulation: No dysarthria  Orientation: Person, Place, Time   Visual Fields: Full  EOM (CN III, IV,  VI): Full/intact  Pupils (CN II, III): PERRL  Facial Sensation (CN V): Normal  Facial Movement (CN VII): Symmetric facial expression    Motor: Arm left  Normal 5/5  Leg left  Normal 5/5  Arm right  Normal 5/5  Leg right Paresis: 4/5  Cebellar: No evidence of appendicular or axial ataxia  Sensation: Intact to light touch, temperature and vibration  Tone: Normal tone throughout       Laboratory:  CMP:   Recent Labs   Lab 10/23/18  0502   CALCIUM 9.2   *   K 3.7   CO2 28   CL 98   BUN 25*   CREATININE 1.0     BMP:   Recent Labs   Lab 10/23/18  0502   *   K 3.7   CL 98   CO2 28   BUN 25*   CREATININE 1.0   CALCIUM 9.2     CBC:   Recent Labs   Lab 10/23/18  0502   WBC 4.20   RBC 3.07*   HGB 9.8*   HCT 30.8*   *   *   MCH 31.9*   MCHC 31.8*     Lipid Panel:   Recent Labs   Lab 10/20/18  0404   CHOL 98*   LDLCALC 60.2*   HDL 25*   TRIG 64     Coagulation:   Recent Labs   Lab 10/19/18  2113  10/23/18  0502   INR 1.4*  --   --    APTT 27.1   < > 44.0*    < > = values in this interval not displayed.     Hgb A1C:   Recent Labs   Lab 10/19/18  1358   HGBA1C 4.9     TSH:   Recent Labs   Lab 10/19/18  1358   TSH 0.550       Diagnostic Results     Brain Imaging   Brain imaging:  CTA Head and Neck 10/19/2018  No acute intracranial abnormality.   Focal area of less than 50% stenosis versus artifact in the V2 left vertebral artery.  Otherwise, no high-grade stenosis or major vessel occlusion within the neck vessels.  No evidence of large vessel occlusion in the intracranial circulation.  Generalized cerebral volume loss.  Chronic microvascular ischemic changes.  Mild mucosal thickening in the left maxillary sinus and left ethmoid air cells.     2D echo -10/19/18  Moderately enlarged LA    1 - Severely depressed left ventricular systolic function (EF 20-25%).     2 - Concentric remodeling.     3 - Biatrial enlargement.     4 - Low normal to mildly depressed right ventricular systolic function .     5 - Mitral  valve prosthesis.     6 - Moderate tricuspid regurgitation.     7 - Increased central venous pressure.     8 - Pulmonary hypertension. The estimated PA systolic pressure is 52 mmHg    EEG 10/21/2018  This is an abnormal C-EEG due to the mild generalized slowing seen, suggestive of mild diffuse or multifocal cerebral dysfunction.  No epileptiform activity or electrographic seizures seen.              Preeti Page, LUIS FELIPE  Com[facility] Stroke Center  Department of Vascular Neurology   [facility]

## 2018-10-23 NOTE — PLAN OF CARE
Problem: Patient Care Overview  Goal: Plan of Care Review  Outcome: Ongoing (interventions implemented as appropriate)  Pt free of falls/trauma/injuries.  Denies c/o SOB, CP, or discomfort.  Generalized skin remains CDI; generalized edema noted. Urine still dark mary in color; fluids encouraged.   Wt increased since yesterday.  Pt started on Digoxin for A Flutter. Remains on Vanc and Rocephin for Respiratory infection. Pt NPO for possible Cardioversion in the morning. Heparin gtt still going at 17 units. Fall bundle in place. POC explained; no questions at this time.  Pt tolerating plan of care.

## 2018-10-23 NOTE — HOSPITAL COURSE
10/23: Remains on heparin gtt.  Plan for cardioversion at some point.  Recommending MRI after cardioversion.

## 2018-10-23 NOTE — PLAN OF CARE
Problem: Patient Care Overview  Goal: Plan of Care Review  Outcome: Ongoing (interventions implemented as appropriate)  Pt is A, A, Ox4. Calm, cooperative. Free of falls, trauma, and injuries. Skin intact. Pt educated on treatment plan. Pt demonstrates and verbalizes understanding. VSS. Pt's urine has cleared up, light yellow. Paced on monitor. DCCV and NITA to be done tomorrow, NPO past MN. Heparin to be DC'd today with first dose of Xarelto. IV abx DC'd. Lytes replaced. Cardiac diet, 1500 fluid restriction. IVP lasix added to MAR. Plan of care reviewed with pt.

## 2018-10-23 NOTE — ASSESSMENT & PLAN NOTE
- Unsure if he is a Doac candidate as he has stenosis of bioprosthetic valve.  Heparin on board in interim.   - has warfarin, pradaxa and xarelto at home he states now, xarelto last filled August 31.

## 2018-10-23 NOTE — ASSESSMENT & PLAN NOTE
- Referred for CTS to evaluate restenosis of bioprosthetic valve, CT surgery does not feel his is a candidate for surgery, will need old notes to look at gradient.    - now with atrial flutter.  Cards does not feel this is valvular related and that he can go back on a DOAC when he's ready.

## 2018-10-23 NOTE — PLAN OF CARE
Problem: Physical Therapy Goal  Goal: Physical Therapy Goal  Goals to be met by: 2018     Patient will increase functional independence with mobility by performin. Supine to sit with [place]  2. Sit to stand transfer with Modified [place]  3. Gait  x 200 feet with Modified [place] using rollator.   4. Lower extremity exercise program x20 reps per handout, with independence    Outcome: Ongoing (interventions implemented as appropriate)  Pt evaluated and appropriate goals established

## 2018-10-23 NOTE — PROGRESS NOTES
CARDIOLOGY BRIEF NOTE    Patient seen and examined full note to follow  63 M w ICM 20-25%, CAD s/p CABG and MR s/p MV replacement on AC, Alcohol abuse, VF/VT s/p BiV ICD p/w Aflutter and ADHF. Had change of mental status with -ve CT head, unable to get MRI due to leads.   - Since EP will not perform DCCV due to stroke risk and medication non-compliance (has not filled xarelto since 8/31) will have to use rate control  - Digoxin load and Beta blocker watching BP  - Continue AC may switch to Xarelto   - Agree with holding diuresis and reassess  - Will need to repeat TTE when patient HR normal (60-80) to better assess mean gradient (may be estimated due to tachycardia/Aflutter episode)

## 2018-10-23 NOTE — PLAN OF CARE
10/23/18 0845   Discharge Reassessment   Assessment Type Discharge Planning Reassessment   Do you have any problems affording any of your prescribed medications? TBD   Discharge Plan A Home;Home Health

## 2018-10-23 NOTE — PROGRESS NOTES
[facility]  Cardiology  Progress Note    Patient Name: Quentin Mckeon  MRN: [id number]  Admission Date: 10/19/2018  Hospital Length of Stay: 4 days  Code Status: Full Code   Attending Physician: Ebony Ram MD   Primary Care Physician: Primary Doctor No  Expected Discharge Date: 10/26/2018  Principal Problem:Atrial flutter    Subjective:     Hospital Course:   10/22: Patient back to baseline aphasia and left sided weakness resolved still in Aflutter, No chest pain or SOB.     10/23: DCCV/NITA Rescheduled for tommorow. Patient in Aflutter with mode switch pacing VVI, HR . Vol overloaded    Interval History: Doing well some SOB with working with PT no chest pain    Review of Systems   Constitution: Negative for chills, decreased appetite and diaphoresis.   HENT: Negative for congestion and ear discharge.    Eyes: Negative for blurred vision and discharge.   Cardiovascular: Negative for chest pain, dyspnea on exertion, irregular heartbeat, leg swelling and paroxysmal nocturnal dyspnea.   Respiratory: Negative for cough, hemoptysis and shortness of breath.    Gastrointestinal: Negative for abdominal pain.     Objective:     Vital Signs (Most Recent):  Temp: 98.4 °F (36.9 °C) (10/23/18 1522)  Pulse: 99 (10/23/18 1522)  Resp: 18 (10/23/18 1522)  BP: 91/64 (10/23/18 1522)  SpO2: 95 % (10/23/18 1522) Vital Signs (24h Range):  Temp:  [97.8 °F (36.6 °C)-98.7 °F (37.1 °C)] 98.4 °F (36.9 °C)  Pulse:  [] 99  Resp:  [17-18] 18  SpO2:  [94 %-99 %] 95 %  BP: ()/(64-80) 91/64     Weight: 109.5 kg (241 lb 6.5 oz)  Body mass index is 34.64 kg/m².     SpO2: 95 %  O2 Device (Oxygen Therapy): room air      Intake/Output Summary (Last 24 hours) at 10/23/2018 1750  Last data filed at 10/23/2018 1345  Gross per 24 hour   Intake 1407.23 ml   Output 2575 ml   Net -1167.77 ml       Lines/Drains/Airways     Peripheral Intravenous Line                 Peripheral IV - Single Lumen 10/19/18 0800 Left Antecubital 4  days         Peripheral IV - Single Lumen 10/19/18 0800 Right Wrist 4 days         Peripheral IV - Single Lumen 10/23/18 0630 Right Antecubital less than 1 day                Physical Exam   Constitutional: He is oriented to person, place, and time. He appears well-developed and well-nourished. No distress.   Eyes: Conjunctivae are normal. Pupils are equal, round, and reactive to light.   Neck: No tracheal deviation present. No thyromegaly present.   Cardiovascular: Regular rhythm and intact distal pulses. Tachycardia present. Exam reveals gallop and S3. Exam reveals no friction rub.   No murmur heard.  Pulses:       Radial pulses are 2+ on the left side.        Femoral pulses are 2+ on the left side.  Pulmonary/Chest: Effort normal. No respiratory distress. He has no wheezes. He has rales.   Abdominal: Soft. Bowel sounds are normal. He exhibits no distension. There is no tenderness.   Musculoskeletal: He exhibits edema. He exhibits no deformity.   Neurological: He is alert and oriented to person, place, and time. No cranial nerve deficit. Coordination normal.   Skin: Skin is warm and dry. He is not diaphoretic.   Psychiatric: He has a normal mood and affect. His behavior is normal.       Significant Labs:   BMP:   Recent Labs   Lab 10/22/18  0357 10/23/18  0502   GLU 84 89    135*   K 3.6 3.7   CL 96 98   CO2 31* 28   BUN 27* 25*   CREATININE 1.3 1.0   CALCIUM 9.0 9.2   MG 1.9 1.7   , CMP   Recent Labs   Lab 10/22/18  0357 10/23/18  0502    135*   K 3.6 3.7   CL 96 98   CO2 31* 28   GLU 84 89   BUN 27* 25*   CREATININE 1.3 1.0   CALCIUM 9.0 9.2   ANIONGAP 9 9   ESTGFRAFRICA >60.0 >60.0   EGFRNONAA 58.1* >60.0   , CBC   Recent Labs   Lab 10/22/18  0357 10/23/18  0502   WBC 4.77 4.20   HGB 11.1* 9.8*   HCT 36.5* 30.8*   * 125*   , Lipid Panel No results for input(s): CHOL, HDL, LDLCALC, TRIG, CHOLHDL in the last 48 hours. and Troponin   Recent Labs   Lab 10/21/18  2145 10/22/18  0357   TROPONINI  0.183* 0.200*       Significant Imaging: Echocardiogram:   2D echo with color flow doppler:   Results for orders placed or performed during the hospital encounter of 10/19/18   2D echo with color flow doppler   Result Value Ref Range    Calculated EF 23 (A) 55 - 65    Est. PA Systolic Pressure 51.72 (A)     Tricuspid Valve Regurgitation MODERATE (A)      Assessment and Plan:     Brief HPI: 64 yo M HFrEF, Aflutter, p/w ADHF         * Atrial flutter      -EP plans to DCCV + NITA tommorow 10/24  - Digoxin load and cont Dig and Beta blocker watching BP  - Continue AC may switch to Xarelto   - Agree with holding diuresis and reassess  - Will need to repeat TTE when patient HR normal (60-80) to better assess mean gradient (may be estimated due to tachycardia/Aflutter episode)         Mitral valve stenosis    - Repeat TTE after cardioversion to get accurate Mean gradient     Acute on chronic systolic heart failure    - Some vol overload today, JVD + , BLE, SOB  - Will restart Lasix 40 IV BID  - Watch electrolytes and aggresively replace   - If good response switch to PO toresmide soon  -  Watch BP         VTE Risk Mitigation (From admission, onward)        Ordered     rivaroxaban tablet 20 mg  with dinner      10/23/18 1601     heparin 25,000 units in dextrose 5% 250 mL (100 units/mL) infusion LOW INTENSITY nomogram - [facility]  Continuous      10/19/18 1925     heparin 25,000 units in dextrose 5% (100 units/ml) IV bolus from bag - ADDITIONAL PRN BOLUS - 60 units/kg  As needed (PRN)      10/19/18 1925     heparin 25,000 units in dextrose 5% (100 units/ml) IV bolus from bag - ADDITIONAL PRN BOLUS - 30 units/kg  As needed (PRN)      10/19/18 1925          Debra Aguiar MD  Cardiology  [facility]

## 2018-10-23 NOTE — ASSESSMENT & PLAN NOTE
Patient seen and examined full note to follow  63 M w ICM 20-25%, CAD s/p CABG and MR s/p MV replacement on AC, Alcohol abuse, VF/VT s/p BiV ICD p/w Aflutter and ADHF. Had change of mental status with -ve CT head, unable to get MRI due to leads.   - Since EP will not perform DCCV due to stroke risk and medication non-compliance (has not filled xarelto since 8/31) will have to use rate control  - Digoxin load and Beta blocker watching BP  - Continue AC may switch to Xarelto   - Agree with holding diuresis and reassess  - Will need to repeat TTE when patient HR normal (60-80) to better assess mean gradient (may be estimated due to tachycardia/Aflutter episode)

## 2018-10-23 NOTE — ASSESSMENT & PLAN NOTE
- patient actually wasn't taking three of his meds, he cannot remember which three.  Donaldo Headley NP should have access to current med regimen. Number listed above.  - initially thought to be hypotensive from entresto, BB, imdur, hydralazine, held around event of recent aphasia episode vs stroke vs seizure, now continues to be lightheaded and dizzy despite holding meds and IVF.  Will resume meds slowly to take load off heart and follow closely. Resumed losartan 12.5mg per cardiology recommendation.  ? Hypotensive origin cardiac or intracerebral, no s/s of persistent stroke or aphasia

## 2018-10-23 NOTE — ASSESSMENT & PLAN NOTE
- heparin gtt ? Candidate for noac with bioprosthetic valve stenosis  - intially held b/p meds for perfusion sake d/t recent aphasia, now he has A flutter and low EF, added low dose beta blocker and now they want to dig load him   - no plans to NITA/ DCCV

## 2018-10-23 NOTE — ANESTHESIA PREPROCEDURE EVALUATION
[facility] [address]  Anesthesia Pre-Operative Evaluation         Patient Name: Quentin Mckeon  YOB: 1955  MRN: [id number]    SUBJECTIVE:     10/23/2018    Pre-operative evaluation for Procedure(s) (LRB):  CARDIOVERSION (N/A)    Quentin Mckeon is a 63 y.o. male with HFrEF (EF 35%), VF/VT s/p BiV AICD (extraction and reimplantation 2/2018 with some lead retention d/t inability to remove as it's stuck under clavicle), CAD s/p CABG 2014, MV bioprosthetic repair now with severe restenosis 2014, AF on Xarelto, + daily smoker, + etoh abuse who presented to [facility] with SOB, with a 3 day history of chest pain.  Patient has had frequent readmissions suspected to be 2/2 MV stenosis.  Prior to admission patient had a fall with an AICD lead crush injury and sternal hematoma.  He was transferred [place] [facility] [place] possible lead extraction and possible MVR.  AICD interrogation revealing atrial flutter.    During this hospitalization patient's home medications were ordered and he received them with subsequent drop in his b/p from 120's to 90's.  Shortly after had an episode of aphasia with left sided weakness, stroke code was called, head CT negative. Concern for stroke versus seizure.  24 hour EEG negative for epileptiform activity.     Patient now presents for the above procedure(s).      LDA:        Peripheral IV - Single Lumen 10/19/18 0800 Left Antecubital (Active)   Site Assessment Clean;Dry;Intact 10/23/2018  8:00 AM   Line Status Infusing 10/23/2018  8:00 AM   Dressing Status Clean;Dry;Intact 10/23/2018  8:00 AM   Dressing Intervention Dressing reinforced 10/22/2018  8:00 AM   Dressing Change Due 10/23/18 10/22/2018  7:58 PM   Site Change Due 10/23/18 10/22/2018  7:58 PM   Reason Not Rotated Not due 10/22/2018  7:58 PM   Number of days: 4            Peripheral IV - Single Lumen 10/19/18 0800 Right Wrist (Active)   Site Assessment Clean;Dry;Intact 10/23/2018  8:00 AM   Line Status Infusing 10/23/2018   8:00 AM   Dressing Status Clean;Dry;Intact 10/23/2018  8:00 AM   Dressing Intervention Dressing reinforced 10/22/2018  8:00 AM   Dressing Change Due 10/23/18 10/22/2018  7:58 PM   Site Change Due 10/23/18 10/22/2018  7:58 PM   Reason Not Rotated Not due 10/22/2018  7:58 PM   Number of days: 4            Peripheral IV - Single Lumen 10/23/18 0630 Right Antecubital (Active)   Site Assessment Clean;Dry;Intact 10/23/2018  8:00 AM   Line Status Flushed 10/23/2018  8:00 AM   Dressing Status Clean;Dry;Intact 10/23/2018  8:00 AM   Dressing Intervention New dressing 10/23/2018  6:30 AM   Dressing Change Due 10/27/18 10/23/2018  6:30 AM   Site Change Due 10/27/18 10/23/2018  6:30 AM   Reason Not Rotated Not due 10/23/2018  8:00 AM   Number of days: 0       Previous airway:   None documented.    Drips:    heparin (porcine) in D5W 17 Units/kg/hr (10/23/18 0735)       Patient Active Problem List   Diagnosis    Heart block    AICD lead malfunction    Benign essential HTN    HLD (hyperlipidemia)    Esophageal erosions    Acute on chronic systolic heart failure    CAD (coronary artery disease) of artery bypass graft    Chronic anticoagulation    Chronic stable angina    Ventricular tachycardia, monomorphic    Atrial fibrillation    Mitral valve stenosis    Biventricular ICD (implantable cardioverter-defibrillator) in place    Coronary artery disease involving native coronary artery of native heart without angina pectoris    Hx of CABG 2014    Prosthetic mitral valve stenosis bioprosthetic 2014    Atrial flutter    Stroke vs seizure       Review of patient's allergies indicates:  No Known Allergies    Current Inpatient Medications:   albuterol-ipratropium  3 mL Nebulization Q6H WAKE    atorvastatin  40 mg Oral QHS    [START ON 10/24/2018] digoxin  0.125 mg Oral Daily    fluticasone-vilanterol  1 puff Inhalation Daily    furosemide  40 mg Intravenous BID    losartan  12.5 mg Oral Daily    metoprolol tartrate   25 mg Oral TID    nicotine  1 patch Transdermal Daily    ranolazine  500 mg Oral BID    rivaroxaban  15 mg Oral BID WM    senna-docusate 8.6-50 mg  1 tablet Oral BID       No current facility-administered medications on file prior to encounter.      Current Outpatient Medications on File Prior to Encounter   Medication Sig Dispense Refill    albuterol (PROAIR HFA) 90 mcg/actuation inhaler Inhale 2 puffs into the lungs every 6 (six) hours as needed for Wheezing. Rescue      atorvastatin (LIPITOR) 40 MG tablet Take 40 mg by mouth once daily.      budesonide-formoterol 160-4.5 mcg (SYMBICORT) 160-4.5 mcg/actuation HFAA Inhale 2 puffs into the lungs every 12 (twelve) hours. Controller      furosemide (LASIX) 40 MG tablet Take 1 tablet (40 mg total) by mouth 2 (two) times daily. (Patient taking differently: Take 80 mg by mouth 2 (two) times daily. ) 60 tablet 3    hydrALAZINE (APRESOLINE) 25 MG tablet Take 1 tablet (25 mg total) by mouth 3 (three) times daily. Do not take this medicine until you see your PCP in 1 week      isosorbide mononitrate (IMDUR) 30 MG 24 hr tablet Take 1 tablet (30 mg total) by mouth once daily. 30 tablet 3    magnesium oxide (MAG-OX) 400 mg (241.3 mg magnesium) tablet Take 400 mg by mouth once daily.      metOLazone (ZAROXOLYN) 5 MG tablet Take 5 mg by mouth as needed. Twice weekly as needed      metoprolol succinate (TOPROL-XL) 25 MG 24 hr tablet Take 12.5 mg by mouth once daily.      potassium chloride (KLOR-CON) 10 MEQ TbSR Take 20 mEq by mouth 3 (three) times daily. 2  Caps tid      ranolazine (RANEXA) 500 MG Tb12 Take 500 mg by mouth 2 (two) times daily.      rivaroxaban (XARELTO) 20 mg Tab Take 1 tablet (20 mg total) by mouth once daily. Do not take this medication for 5 days after procedure (restart on 2/18/2018) (Patient taking differently: Take 20 mg by mouth once daily. )      sacubitril-valsartan (ENTRESTO) 49-51 mg per tablet Take 1 tablet by mouth 2 (two) times  daily.      acetaminophen (TYLENOL) 325 MG tablet Take 2 tablets (650 mg total) by mouth every 4 (four) hours as needed.  0    amiodarone (PACERONE) 200 MG Tab Take 1 tablet (200 mg total) by mouth 2 (two) times daily. Take 200mg twice a day until seen by Dr. Fitzgerald in clinic (Patient taking differently: Take 200 mg by mouth once daily. Take 200mg a day at home not listed in transfer med list, though he said he took it yesterday) 60 tablet 3    nitroGLYCERIN (NITROSTAT) 0.4 MG SL tablet Place 0.4 mg under the tongue every 5 (five) minutes as needed for Chest pain.         Past Surgical History:   Procedure Laterality Date    EXTRACTION-LEAD N/A 2/12/2018    Performed by Jose Fitzgerald MD at [facility] OR 2ND FLR    EXTRACTION-LEAD N/A 2/12/2018    Performed by Jose Fitzgerald MD at [facility] CATH LAB    INSERTION-ICD-BIVENTRICULAR N/A 2/12/2018    Performed by Jose Fitzgerald MD at [facility] CATH LAB       Social History     Socioeconomic History    Marital status: Unknown     Spouse name: Not on file    Number of children: Not on file    Years of education: Not on file    Highest education level: Not on file   Social Needs    Financial resource strain: Not on file    Food insecurity - worry: Not on file    Food insecurity - inability: Not on file    Transportation needs - medical: Not on file    Transportation needs - non-medical: Not on file   Occupational History    Not on file   Tobacco Use    Smoking status: Current Every Day Smoker     Packs/day: 0.25     Years: 50.00     Pack years: 12.50    Smokeless tobacco: Current User     Types: Snuff   Substance and Sexual Activity    Alcohol use: No    Drug use: No    Sexual activity: Not on file   Other Topics Concern    Not on file   Social History Narrative    Not on file       OBJECTIVE:     Vital Signs Range (Last 24H):  Temp:  [36.6 °C (97.8 °F)-37.1 °C (98.7 °F)]   Pulse:  []   Resp:  [17-19]   BP: ()/(61-80)   SpO2:  [95  %-99 %]       Significant Labs:  Lab Results   Component Value Date    WBC 4.20 10/23/2018    HGB 9.8 (L) 10/23/2018    HCT 30.8 (L) 10/23/2018     (L) 10/23/2018    CHOL 98 (L) 10/20/2018    TRIG 64 10/20/2018    HDL 25 (L) 10/20/2018    ALT 25 10/19/2018    AST 32 10/19/2018     (L) 10/23/2018    K 3.7 10/23/2018    CL 98 10/23/2018    CREATININE 1.0 10/23/2018    BUN 25 (H) 10/23/2018    CO2 28 10/23/2018    TSH 0.550 10/19/2018    INR 1.4 (H) 10/19/2018    HGBA1C 4.9 10/19/2018         Diagnostic Studies:       EKG:   Vent. Rate : 095 BPM     Atrial Rate : 095 BPM     P-R Int : 000 ms          QRS Dur : 182 ms      QT Int : 526 ms       P-R-T Axes : 266 -53 091 degrees     QTc Int : 661 ms    Ventricular-paced rhythm  Abnormal ECG  When compared with ECG of 20-OCT-2018 17:15,  Vent. rate has decreased BY   7 BPM  Confirmed by MARYJANE ABAD MD ([id number]) on 10/22/2018 7:49:35 PM    2D ECHO:  Results for orders placed or performed during the hospital encounter of 10/19/18   2D echo with color flow doppler   Result Value Ref Range    Calculated EF 23 (A) 55 - 65    Est. PA Systolic Pressure 51.72 (A)     Tricuspid Valve Regurgitation MODERATE (A)          ASSESSMENT/PLAN:         Anesthesia Evaluation    I have reviewed the Patient Summary Reports.     I have reviewed the Medications.     Review of Systems  Anesthesia Hx:  No problems with previous Anesthesia Denies Hx of Anesthetic complications  History of prior surgery of interest to airway management or planning: Denies Family Hx of Anesthesia complications.   Denies Personal Hx of Anesthesia complications.   Social:  Smoker, No Alcohol Use    Hematology/Oncology:     Oncology Normal    -- Anemia:   EENT/Dental:EENT/Dental Normal   Cardiovascular:   Pacemaker Hypertension Past MI CAD  CABG/stent Dysrhythmias  Angina, with exertion CHF hyperlipidemia    Pulmonary:   Denies COPD.  Denies Asthma. Current smoker   Renal/:  Renal/ Normal      Hepatic/GI:  Hepatic/GI Normal    Neurological:  Neurology Normal    Endocrine:  Endocrine Normal    Psych:  Psychiatric Normal           Physical Exam  General:  Obesity    Airway/Jaw/Neck:  Airway Findings: Mouth Opening: Normal Tongue: Normal  General Airway Assessment: Adult  Mallampati: III  TM Distance: Normal, at least 6 cm  Jaw/Neck Findings:  Neck ROM: Normal ROM  Neck Findings: Normal    Eyes/Ears/Nose:  EYES/EARS/NOSE FINDINGS: Normal   Dental:  Dental Findings: In tact, Periodontal disease, Mild    Chest/Lungs:  Chest/Lungs Findings: Clear to auscultation, Normal Respiratory Rate     Heart/Vascular:  Heart Findings: Rate: Normal  Rhythm: Regular Rhythm  Heart murmur: negative       Mental Status:  Mental Status Findings:  Cooperative, Alert and Oriented         Anesthesia Plan  Type of Anesthesia, risks & benefits discussed:  Anesthesia Type:  MAC, general  Patient's Preference:   Intra-op Monitoring Plan: standard ASA monitors  Intra-op Monitoring Plan Comments:   Post Op Pain Control Plan: per primary service following discharge from PACU  Post Op Pain Control Plan Comments:   Induction:   IV  Beta Blocker:         Informed Consent: Patient understands risks and agrees with Anesthesia plan.  Questions answered. Anesthesia consent signed with patient.  ASA Score: 4     Day of Surgery Review of History & Physical:            Ready For Surgery From Anesthesia Perspective.

## 2018-10-23 NOTE — PT/OT/SLP EVAL
"Physical Therapy Evaluation    Patient Name:  Quentin Mckeon   MRN:  [id number]    Recommendations:     Discharge Recommendations:  home health PT   Discharge Equipment Recommendations: none   Barriers to discharge: Decreased caregiver support    Assessment:     Quentin Mckeon is a 63 y.o. male admitted with a medical diagnosis of Atrial flutter.  He presents with the following impairments/functional limitations:  weakness, impaired endurance, impaired functional mobilty, gait instability, impaired balance, impaired joint extensibility, decreased lower extremity function. Pt tolerated activity with c/o mild dizziness, moderate SOB, 9/10 RPE following ambulation. Pt mobility primarily limited by pain, decreased tolerance to ambulation. Pt would continue to benefit from acute skilled therapy intervention to address deficits and progress toward prior level of function.       Rehab Prognosis:  good; patient would benefit from acute skilled PT services to address these deficits and reach maximum level of function.      Recent Surgery: Procedure(s) (LRB):  CARDIOVERSION (N/A)      Plan:     During this hospitalization, patient to be seen 3 x/week to address the above listed problems via gait training, therapeutic activities, therapeutic exercises, neuromuscular re-education  · Plan of Care Expires:      Plan of Care Reviewed with: patient    Subjective     Communicated with RN prior to session.  Patient found supine upon PT entry to room, agreeable to evaluation.      Chief Complaint: pain in chest and knee. "I can move around fine"   Patient comments/goals: to get better and return home   Pain/Comfort:  · Pain Rating 1: 8/10  · Location - Orientation 1: midline  · Location 1: chest(R knee )  · Pain Addressed 1: Reposition, Distraction  · Pain Rating Post-Intervention 1: (pt continues to report pain, does not quantify )    Patients cultural, spiritual, Shinto conflicts given the current situation: none reported     Living " Environment:  Pt lives alone in [place] with no COCO.   Prior to admission, patients level of function was independent with mobility with use of rollator.  Patient has the following equipment: rollator.  DME owned (not currently used): bedside commode.  Upon discharge, patient will have assistance from: pt states he has no consistent assistance available.    Objective:     Patient found with: peripheral IV, telemetry     General Precautions: Standard, fall   Orthopedic Precautions:N/A   Braces: N/A     Exams:  · Cognitive Exam:  Patient is AAOx4, followed all commands, communicates clearly and fluently  · Gross Motor Coordination:  fair  · RLE ROM: WFL  · RLE Strength: unable to accurately assess 2/2 knee pain. Pt reports pain 2/2 recent knee surgery   · LLE ROM: WFL  · LLE Strength: WFL    Functional Mobility:  · Bed Mobility:     · Supine to Sit: stand by assistance  · Transfers:     · Sit to Stand:  stand by assistance with rollator   · Gait: Pt ambulated 100 feet with rollator and CGA with intermittent SBA. Pt required 2 standing rest breaks, no LOB. Pt demo'd flexed posture, decreased aniya, decreased step size, decreased foot clearance, increased lateral sway. Pt reported mild dizziness, moderate SOB and 9/10 RPE following ambulation.     AM-PAC 6 CLICK MOBILITY  Total Score:20       Therapeutic Activities and Exercises:   Pt educated on role of PT/POC, importance of OOB mobility to improve strength and endurance.     Patient left up in chair with all lines intact, call button in reach and PCT  present.    GOALS:   Multidisciplinary Problems     Physical Therapy Goals        Problem: Physical Therapy Goal    Goal Priority Disciplines Outcome Goal Variances Interventions   Physical Therapy Goal     PT, PT/OT Ongoing (interventions implemented as appropriate)     Description:  Goals to be met by: 2018     Patient will increase functional independence with mobility by performin. Supine to sit with  [place]  2. Sit to stand transfer with Modified [place]  3. Gait  x 200 feet with Modified [place] using rollator.   4. Lower extremity exercise program x20 reps per handout, with independence                      History:     History reviewed. No pertinent past medical history.    Past Surgical History:   Procedure Laterality Date    EXTRACTION-LEAD N/A 2/12/2018    Performed by Jose Fitzgerald [facility] at [facility] OR 2ND FLR    EXTRACTION-LEAD N/A 2/12/2018    Performed by Jose Fitzgerald MD at [facility] CATH LAB    INSERTION-ICD-BIVENTRICULAR N/A 2/12/2018    Performed by Jose Fitzgerald MD at [facility] CATH LAB       Clinical Decision Making:     History  Co-morbidities and personal factors that may impact the plan of care Examination  Body Structures and Functions, activity limitations and participation restrictions that may impact the plan of care Clinical Presentation   Decision Making/ Complexity Score   Co-morbidities:   [] Time since onset of injury / illness / exacerbation  [] Status of current condition  []Patient's cognitive status and safety concerns    [] Multiple Medical Problems (see med hx)  Personal Factors:   [] Patient's age  [] Prior Level of function   [] Patient's home situation (environment and family support)  [] Patient's level of motivation  [] Expected progression of patient      HISTORY:(criteria)    [] 40772 - no personal factors/history    [] 41263 - has 1-2 personal factor/comorbidity     [] 00365 - has >3 personal factor/comorbidity     Body Regions:  [] Objective examination findings  [] Head     []  Neck  [] Trunk   [] Upper Extremity  [] Lower Extremity    Body Systems:  [] For communication ability, affect, cognition, language, and learning style: the assessment of the ability to make needs known, consciousness, orientation (person, place, and time), expected emotional /behavioral responses, and learning preferences (eg, learning barriers, education  needs)  [] For  the neuromuscular system: a general assessment of gross coordinated movement (eg, balance, gait, locomotion, transfers, and transitions) and motor function  (motor control and motor learning)  [] For the musculoskeletal system: the assessment of gross symmetry, gross range of motion, gross strength, height, and weight  [] For the integumentary system: the assessment of pliability(texture), presence of scar formation, skin color, and skin integrity  [] For cardiovascular/pulmonary system: the assessment of heart rate, respiratory rate, blood pressure, and edema     Activity limitations:    [] Patient's cognitive status and saf ety concerns          [] Status of current condition      [] Weight bearing restriction  [] Cardiopulmunary Restriction    Participation Restrictions:   [] Goals and goal agreement with the patient     [] Rehab potential (prognosis) and probable outcome      Examination of Body System: (criteria)    [] 84900 - addressing 1-2 elements    [] 63444 - addressing a total of 3 or more elements     [] 26545 -  Addressing a total of 4 or more elements         Clinical Presentation: (criteria)  Choose one     On examination of body system using standardized tests and measures patient presents with 1-2 elements from any of the following: body structures and functions, activity limitations, and/or participation restrictions.  Leading to a clinical presentation that is considered stable and/or uncomplicated                              Clinical Decision Making  (Eval Complexity):  Low- 27121     Time Tracking:     PT Received On: 10/23/18  PT Start Time: 0857     PT Stop Time: 0915  PT Total Time (min): 18 min     Billable Minutes: Evaluation 10 mins  and Gait Training 8 mins       Shantell Zavaleta, PT  10/23/2018

## 2018-10-23 NOTE — ASSESSMENT & PLAN NOTE
62 y/o male with sudden onset of mental status change, minimal responsiveness, urinary incontinence, and left sided weakness.  Initial imaging revealed no acute findings.  Patient returned to baseline but continues with generalized weakness.  Left leg noted to be more weak than right on exam. EEG did show generalized slowing, but no seizure activity.  Patient scheduled for cardioversion then MRI.      Antithrombotics for secondary stroke prevention: Anticoagulants: Heparin gtt    Statins for secondary stroke prevention and hyperlipidemia, if present:   Statins: Atorvastatin- 40 mg daily    Aggressive risk factor modification: HTN, Smoking, HLD, A-Fib, CAD     Rehab efforts: PT/OT/SLP to evaluate and treat    Diagnostics ordered/pending: MRI head without contrast to assess brain parenchyma    VTE prophylaxis: Currently on heparin gtt; SCDs    BP parameters: Goal normotension

## 2018-10-23 NOTE — PROGRESS NOTES
[facility] Hospital Medicine  Progress Note    Patient Name: Quentin Mckeon  MRN: [id number]  Patient Class: IP- Inpatient   Admission Date: 10/19/2018  Length of Stay: 3 days  Attending Physician: Justin Robles MD  Primary Care Provider: Primary Doctor [facility] Medicine Team: [facility] J Chaya Flores NP    Subjective:     Principal Problem:Acute on chronic systolic heart failure    HPI:  62 y/o AAM with PMH of sCHF (EF 35%), VF/VT s/p BiV AICD (extraction and reimplantation 2/2018 with some lead retention d/t inability to remove as it's stuck under clavicle), CAD s/p CABG 2014, MV bioprosthetic repair now with severe restenosis 2014, AF on Xarelto, + daily smoker, + etoh abuse (most recent drink about a week ago, passed out and hit chest on a kitchen cabinet, has not drank since, no h/o DT's) who presented to [facility] with SOB, chest pain x 3 days.  He was diagnosed with ADHF  secondary to mitral bioprosthetic restenosis. He had a prior admission for cough, body aches, productive cough, 10/12 flu swab negative so abx were stopped.  He admits to eating out, not watching his salt, per home pharmacy records he has not filled is amiodarone since 5/15/18.  He also appears to have been out of a most of his medications for at least a month or more.  Last fills are in home med rec tab.  Pt has had frequent readmissions suspected to be 2/2 MV stenosis.  In addition, pt had fall prior to admission which has not affected his AICD lead per interrogation, impedance normal.  There was a concern that he may have had a lead crush injury as he had a small sternal hematoma after passing out during a etoh induced binge and fell on kitchen cabinet.  His Xarelto was held initially but was restarted OSH. Interrogation also revealed what appears to be 2:1 flutter at rate of 100.  He had one episode on 10/19 that fell into the VF zone for which he was ATP'ed out of in one round.  Pt  transferred to [facility] for possible AICD extraction by Dr. Fitzgerald and consideration of MVR by Dr. Ortiz; case d/w these consultants per Dr. Jennifer Johnson note.           Hospital Course:  Transferred from [facility] to be evaluated by CTS for restenosis of bioprosthetic mitral valve and concern for crush injury to ICD lead d/t recent fall from black out episode while intoxicated and severe sternal contusion resulted.  CTS and EP consulted.  CTS awaiting surgical history from OSH, to see about his prosthetic size and gradient, however they have expressed they do not feel his is surgical candidate at this time.  He was supposed to see them earlier this month, he lives alone and has transportation issues.     [employer] rep interrogated his Biv/ICD device and he was noted to be in 2:1 atrial flutter which could be exacerbating his underlying heart failure.    He was started on heparin gtt as the final plan has not been finalized regarding surgery or other possible procedures such as NITA/DCCV.  Echo performed shows an decline in his EF to 20-25% from 30-35%. This could be resulting from worsening stenosis of his bioprosthetic mitral valve, med noncompliance as demonstrated by running out of meds and not refilling meds he's supposed to be taking and / or worsening cardiomyopathy from resumption of Etoh use.      On admission his meds were reviewed and he stated he was taking all his medications as prescribed, so they were ordered as prescribed and he received them with subsequent drop in his b/p from 120's to 90's.  Later that day he had an episode of aphasia and Left sided weakness, stroke code was called; head CT negative.  Neuro was also concerned for seizure; eeg negative for seizure activity.  His ICD is MRI compatible, however MRI will only do devices M-F with the rep present; they are now refusing MRI all together since he has a lead that is not attached to his device.  He was given 500ml fluid over the  course of several hours with no pulm edema, no increase in urine output, no change in his b/p despite holding all b/p meds. He does not have any recollection of events that occurred that evening.      He has two episodes of 10/10 chest pain first being 10/20, which he was given 2 mg Morphine.  His ECG revealed V paced 101 bpm, with absolutely reproducible chest pain with mild palpation; appears he's having muscle spasms related to his fall onto his sternum. Subsequent episode episode 10/21 where he was given IV toradol with relief, however possible bump in CR.  Troponin's elevated but flat 0.2 along with historically the same.    EP/ Dr.Abi Sol was considering NITA/ DCCV for flutter, however due to recent aphasia/ stroke like symptoms and noncompliance of medications this is no longer under consideration.  They would like him dig loaded along with his beta blocker.  Cards comanaged also consulted for medical management of his worsening heart failure and restenosis of his mitral valve.  His urine is becoming darker (coke colored) with what appears to be hemolysis related d/t shearing of RBC's through tight valve.  Haptoglobin normal 96, and LDH elevated 271.      Cards co managed was concerned that he may have a concomitant LLL pna. There is no current evidence of LLL infiltrate, no fever, no leukocytosis, procal is elevated at 0.36 in conjunction with sob and green productive sputum prior to admit OSH.  BNP slighlty elevated at 395.  Started on HAP therapy since he's been in two different institutions if no improvement, then de-escalate quickly.  He is a smoker, so productive sputum not unlikely.  Attempted to obtain sputum sample prior to abx initiation, however unsuccessful. Sample was obtained post abx though and in process.    Dispo; Transferred from [facility], consider transferring back since it appears we are not planning surgery nor are we cardioverting and they can arrange his home needs and f/u. He lives  "alone and uses a walker for mobility d/t weakness.  He is followed by an NP Donaldo Headley [phone].  (initially given wrong phone number and couldn't get through, this is now the correct number and his NP should have his most recent med list, unfortunately not able to get through lengthy voice message options at the time of call will attempt again tomorrow).      Interval History:  Patient sitting upright in chair, episode 10/10 chest pain o/n given toradol with relief.  Urine "coke" colored, appears labs c/w hemolysis.  He looks euvolemic to dry, encouraged Po intake.  Updated on plan of care, no surgery as of yet, control a flutter and no NITA/ DCCV as he had recent stroke.  Though cardiology went back to tell him NITA/ DCCV back on will need to be told it's back off again as this change occurred after hours.     Review of Systems   Constitutional: Positive for activity change and fatigue. Negative for appetite change, chills and fever.   HENT: Negative for congestion and trouble swallowing.    Eyes: Negative for visual disturbance.   Respiratory: Positive for cough, chest tightness and shortness of breath. Negative for wheezing.         Productive green sputum cough this am   Cardiovascular: Positive for chest pain (patient also recent fall onto sternum d/t black episode while drinking). Negative for palpitations and leg swelling.   Gastrointestinal: Positive for abdominal distention and abdominal pain. Negative for constipation, nausea and vomiting.   Endocrine: Negative for cold intolerance and heat intolerance.   Genitourinary: Positive for decreased urine volume. Negative for difficulty urinating, frequency and hematuria.   Musculoskeletal: Positive for arthralgias, back pain and gait problem. Negative for myalgias and neck pain.        Uses rolling walker to mobilize around     Skin: Negative for color change, pallor and rash.        Sternal notch hematoma    Neurological: Positive for weakness. Negative " for dizziness, syncope, light-headedness, numbness and headaches.   Hematological: Bruises/bleeds easily.   Psychiatric/Behavioral: Negative for agitation and confusion. The patient is not nervous/anxious.      Objective:     Vital Signs (Most Recent):  Temp: 98.5 °F (36.9 °C) (10/22/18 1958)  Pulse: 94 (10/22/18 1958)  Resp: 18 (10/22/18 1958)  BP: 103/67 (10/22/18 1958)  SpO2: 96 % (10/22/18 1958) Vital Signs (24h Range):  Temp:  [97.9 °F (36.6 °C)-98.7 °F (37.1 °C)] 98.5 °F (36.9 °C)  Pulse:  [] 94  Resp:  [14-20] 18  SpO2:  [95 %-100 %] 96 %  BP: ()/(61-75) 103/67     Weight: 109.5 kg (241 lb 6.5 oz)  Body mass index is 34.64 kg/m².    Intake/Output Summary (Last 24 hours) at 10/22/2018 2054  Last data filed at 10/22/2018 1749  Gross per 24 hour   Intake 1454.87 ml   Output 1150 ml   Net 304.87 ml      Physical Exam   Constitutional: He is oriented to person, place, and time. He appears well-developed and well-nourished. No distress.   HENT:   Head: Normocephalic and atraumatic.   Right Ear: External ear normal.   Left Ear: External ear normal.   Nose: Nose normal.   Mouth/Throat: Oropharynx is clear and moist.   Eyes: Conjunctivae and EOM are normal. No scleral icterus.   Neck: Normal range of motion. Neck supple. Hepatojugular reflux and JVD (pulsatile TR jet) present.   Cardiovascular: Normal rate, regular rhythm and intact distal pulses. Exam reveals no gallop and no friction rub.   Murmur (high pitched blowing III/VI mitral area) heard.  Pulmonary/Chest: Effort normal and breath sounds normal. No respiratory distress. He has no wheezes. He has no rales. He exhibits tenderness (d/t recent fall, reproducible with mild palpation).   Abdominal: Soft. Bowel sounds are normal. He exhibits distension. There is no tenderness.   Musculoskeletal: Normal range of motion. He exhibits no edema or tenderness.   Neurological: He is alert and oriented to person, place, and time. No cranial nerve deficit or  sensory deficit. He exhibits normal muscle tone. Coordination (uses walker for mobility) abnormal.   Skin: Skin is warm and dry. No rash noted. No erythema.   Psychiatric: He has a normal mood and affect. His behavior is normal. Judgment and thought content normal.   Nursing note and vitals reviewed.      Significant Labs:   CBC:   Recent Labs   Lab 10/21/18  1022 10/22/18  0357   WBC 4.41 4.77   HGB 9.9* 11.1*   HCT 31.9* 36.5*   * 142*     CMP:   Recent Labs   Lab 10/21/18  0352 10/22/18  0357    136   K 3.5 3.6   CL 98 96   CO2 28 31*    84   BUN 28* 27*   CREATININE 1.0 1.3   CALCIUM 9.0 9.0   ANIONGAP 10 9   EGFRNONAA >60.0 58.1*     Coagulation:   Recent Labs   Lab 10/22/18  0357   APTT 51.3*       Significant Imaging: I have reviewed all pertinent imaging results/findings within the past 24 hours.    Assessment/Plan:      * Acute on chronic systolic heart failure    - , physical exam findings c/w acute heart failure exacerbation, prior EF 35%, echo shows mitral bioprosthetic valve gradient to be 10mmHg , T bili elevated 2.0  - started Iv lasix 80mg bid, due to + tachypnea and elevated JVD/ HJR and slightly elevated BNP   - 10/19 significant event: episode of aphasia L sided weakness, no stroke noted on CT, EEG negative for seizure activity  - since aphasic event he's c/o LH /dizziness   - b/p's have been on lower side 's and all b/p meds / diuretics held (entresto (last fill several months ago), hydralazine, imdur, Toprol), 500ml IVF given and trendelenburg with no resolution.  ? IF LH/dizzy is cardiac related.  His EF is trended down to 20-25% from 30-35%, ? Alcohol induced vs A flutter vs combination vs stenosis  - No longer planning NITA/ DCCV for A flutter d/t acute aphasia / weakness episode.  Will rate control with BB and now adding dig load per cardiology.    - + ETOH/ smoker abuse / Lives alone/ eats out / cooks with salt educated on all accounts/ noncompliant with  filling meds.  Donaldo Headley NP has a list of all his medications [phone].  Original number that was given was off by one number. This is correct number.  There is a long voice prompt to get through.  Sent secure chat for Carmela Holt to get list of medications he should be on to coincide with his pharmacy records.  He states he's not as compliant as he could be as they keep changing his medications around.  He states he has warfarin, pradaxa and xarelto at home for AC coverage.  He last filled his xarelto 8/31.  He cannot remember is this was one of the three meds he ran out of recently.   - CTS most likely not surgical candidate, awaiting records from [facility] on prior gradients. Radha BRISCOE seemed to think he's not a surgical candidate and didn't seem like she was continuing to pursue an inpatient eval, as she mentioned he can see them in clinic, at that point I reminded her he was supposed to see them on the 10th and canceled d/t transportation issues.   - monitor weights daily  - monitor strict I/o's  - telemetry      Stroke vs seizure    - stroke code called 10/19, head ct negative, aphasic and L sided weakness have resolved  [facility] also [facility], eeg completed, no s/s of seizure activity  - Device MRI compatible, however mri only performs device mri's M-F with rep present and since he has an extra loose lead they will not perform period.  Dr. Melanie Dumont says data does not support, however cannot get MRI performed to confirm or deny recent stroke to account for aphasia and L sided weakness that has since resolved.    - He has absolutely no recollection of events of that time period.        Prosthetic mitral valve stenosis bioprosthetic 2014    - Referred for CTS to evaluate restenosis of bioprosthetic valve, CT surgery does not feel his is a candidate for surgery, will need old notes to look at gradient.    - now with atrial flutter.  Cards does not feel this is  valvular related and that he can go back on a DOAC when he's ready.       Mitral valve stenosis    - currently not a surgical candidate for repair.       Biventricular ICD (implantable cardioverter-defibrillator) in place    - h/o BIV ICD extract implant 2/18, case complicated by lead retention  - EP consulted, h/o VT/VF, no amiodarone filled since May 2018       AICD lead malfunction    - no lead malfunction, interrogation impedance is fine, no lead issues       Ventricular tachycardia, monomorphic    - h/o one episode that landed in VF zone, terminated after one round of ATP  - Discuss with EP about restarting amiodarone and or need to get NITA first as he has also possible 2:1 flutter and risk of chemical cardioversion        Hx of CABG 2014    See above        Coronary artery disease involving native coronary artery of native heart without angina pectoris    - h/o CABG 2014 with bioprosthetic valve, no angina, recent cp d/t trauma from fall during black out episode while drunk. Concern for cardiac contusion.    Echo no evidence of contusion  -  1 - Severely depressed left ventricular systolic function (EF 20-25%).     2 - Concentric remodeling.     3 - Biatrial enlargement.     4 - Low normal to mildly depressed right ventricular systolic function .     5 - Mitral valve prosthesis.     6 - Moderate tricuspid regurgitation.     7 - Increased central venous pressure.     8 - Pulmonary hypertension. The estimated PA systolic pressure is 52 mmHg.      Benign essential HTN    - patient actually wasn't taking three of his meds, he cannot remember which three.  Donaldo Headley NP should have access to current med regimen. Number listed above.  - initially thought to be hypotensive from entresto, BB, imdur, hydralazine, held around event of recent aphasia episode vs stroke vs seizure, now continues to be lightheaded and dizzy despite holding meds and IVF.  Will resume meds slowly to take load off heart and follow closely.  Resumed losartan 12.5mg per cardiology recommendation.  ? Hypotensive origin cardiac or intracerebral, no s/s of persistent stroke or aphasia            Chronic anticoagulation    - Unsure if he is a Doac candidate as he has stenosis of bioprosthetic valve.  Heparin on board in interim.   - has warfarin, pradaxa and xarelto at home he states now, xarelto last filled August 31.        Atrial flutter    - heparin gtt ? Candidate for noac with bioprosthetic valve stenosis  - intially held b/p meds for perfusion sake d/t recent aphasia, now he has A flutter and low EF, added low dose beta blocker and now they want to dig load him   - no plans to NITA/ DCCV     HLD (hyperlipidemia)    - atorva 80  -   Lab Results   Component Value Date    CHOL 98 (L) 10/20/2018     Lab Results   Component Value Date    HDL 25 (L) 10/20/2018     Lab Results   Component Value Date    LDLCALC 60.2 (L) 10/20/2018     Lab Results   Component Value Date    TRIG 64 10/20/2018     Lab Results   Component Value Date    CHOLHDL 25.5 10/20/2018            VTE Risk Mitigation (From admission, onward)        Ordered     heparin 25,000 units in dextrose 5% 250 mL (100 units/mL) infusion LOW INTENSITY nomogram - [facility]  Continuous      10/19/18 1925     heparin 25,000 units in dextrose 5% (100 units/ml) IV bolus from bag - ADDITIONAL PRN BOLUS - 60 units/kg  As needed (PRN)      10/19/18 1925     heparin 25,000 units in dextrose 5% (100 units/ml) IV bolus from bag - ADDITIONAL PRN BOLUS - 30 units/kg  As needed (PRN)      10/19/18 1925              Chaya Flores NP  Department of Hospital Medicine   [facility]

## 2018-10-23 NOTE — ASSESSMENT & PLAN NOTE
- , physical exam findings c/w acute heart failure exacerbation, prior EF 35%, echo shows mitral bioprosthetic valve gradient to be 10mmHg , T bili elevated 2.0  - started Iv lasix 80mg bid, due to + tachypnea and elevated JVD/ HJR and slightly elevated BNP   - 10/19 significant event: episode of aphasia L sided weakness, no stroke noted on CT, EEG negative for seizure activity  - since aphasic event he's c/o LH /dizziness   - b/p's have been on lower side 's and all b/p meds / diuretics held (entresto (last fill several months ago), hydralazine, imdur, Toprol), 500ml IVF given and trendelenburg with no resolution.  ? IF LH/dizzy is cardiac related.  His EF is trended down to 20-25% from 30-35%, ? Alcohol induced vs A flutter vs combination vs stenosis  - No longer planning NITA/ DCCV for A flutter d/t acute aphasia / weakness episode.  Will rate control with BB and now adding dig load per cardiology.    - + ETOH/ smoker abuse / Lives alone/ eats out / cooks with salt educated on all accounts/ noncompliant with filling meds.  Donaldo Headley NP has a list of all his medications [phone].  Original number that was given was off by one number. This is correct number.  There is a long voice prompt to get through.  Sent secure chat for Carmela Holt to get list of medications he should be on to coincide with his pharmacy records.  He states he's not as compliant as he could be as they keep changing his medications around.  He states he has warfarin, pradaxa and xarelto at home for AC coverage.  He last filled his xarelto 8/31.  He cannot remember is this was one of the three meds he ran out of recently.   - CTS most likely not surgical candidate, awaiting records from [facility] on prior gradients. Radha BRISCOE seemed to think he's not a surgical candidate and didn't seem like she was continuing to pursue an inpatient eval, as she mentioned he can see them in clinic, at that point I reminded her he was  supposed to see them on the 10th and canceled d/t transportation issues.   - monitor weights daily  - monitor strict I/o's  - telemetry

## 2018-10-23 NOTE — PROGRESS NOTES
[facility]  Cardiology  Progress Note    Patient Name: Quentin Mckeon  MRN: [id number]  Admission Date: 10/19/2018  Hospital Length of Stay: 3 days  Code Status: Full Code   Attending Physician: Justin Robles MD   Primary Care Physician: Primary Doctor No  Expected Discharge Date: 10/26/2018  Principal Problem:Atrial flutter    Subjective:     Hospital Course:   10/22: Patient back to baseline aphasia and left sided weakness resolved still in Aflutter, No chest pain or SOB.     Interval History:  Doing well back to baseline neurologically, no chest pain or SOB.     Review of Systems   Constitution: Negative for chills, decreased appetite and diaphoresis.   HENT: Negative for congestion and ear discharge.    Eyes: Negative for blurred vision and discharge.   Cardiovascular: Negative for chest pain, dyspnea on exertion, irregular heartbeat, leg swelling and paroxysmal nocturnal dyspnea.   Respiratory: Negative for cough, hemoptysis and shortness of breath.    Gastrointestinal: Negative for abdominal pain.     Objective:     Vital Signs (Most Recent):  Temp: 98.5 °F (36.9 °C) (10/22/18 1958)  Pulse: 94 (10/22/18 1958)  Resp: 18 (10/22/18 1958)  BP: 103/67 (10/22/18 1958)  SpO2: 96 % (10/22/18 1958) Vital Signs (24h Range):  Temp:  [97.9 °F (36.6 °C)-98.7 °F (37.1 °C)] 98.5 °F (36.9 °C)  Pulse:  [] 94  Resp:  [14-20] 18  SpO2:  [95 %-100 %] 96 %  BP: ()/(61-75) 103/67     Weight: 109.5 kg (241 lb 6.5 oz)  Body mass index is 34.64 kg/m².     SpO2: 96 %  O2 Device (Oxygen Therapy): room air      Intake/Output Summary (Last 24 hours) at 10/22/2018 2103  Last data filed at 10/22/2018 1749  Gross per 24 hour   Intake 1454.87 ml   Output 1150 ml   Net 304.87 ml       Lines/Drains/Airways     Peripheral Intravenous Line                 Peripheral IV - Single Lumen 10/19/18 0800 Left Antecubital 3 days         Peripheral IV - Single Lumen 10/19/18 0800 Right Wrist 3 days                Physical  Exam   Constitutional: He is oriented to person, place, and time. He appears well-developed and well-nourished. No distress.   HENT:   Head: Normocephalic and atraumatic.   Mouth/Throat: Oropharynx is clear and moist.   Eyes: Conjunctivae and EOM are normal. Pupils are equal, round, and reactive to light. No scleral icterus.   Neck: Normal range of motion. Neck supple. JVD: difficult to discern JVD.   Cardiovascular: Regular rhythm and intact distal pulses. Tachycardia present.   Murmur heard.  Pulses:       Radial pulses are 2+ on the right side, and 2+ on the left side.   Pulmonary/Chest: Effort normal and breath sounds normal. No respiratory distress.   Symmetrical expansion, mild rales at the bases   Abdominal: Soft. Bowel sounds are normal. There is no hepatosplenomegaly. There is no tenderness.   Musculoskeletal: Normal range of motion. He exhibits no edema.   Bilateral edema 1-2+, with decreased skin turgor   Neurological: He is alert and oriented to person, place, and time. No cranial nerve deficit.   Skin: Skin is warm and dry. No rash noted. He is not diaphoretic.   Psychiatric: He has a normal mood and affect. Judgment and thought content normal.       Significant Labs:   BMP:   Recent Labs   Lab 10/21/18  0352 10/22/18  0357    84    136   K 3.5 3.6   CL 98 96   CO2 28 31*   BUN 28* 27*   CREATININE 1.0 1.3   CALCIUM 9.0 9.0   MG 1.8 1.9   , CMP   Recent Labs   Lab 10/21/18  0352 10/22/18  0357    136   K 3.5 3.6   CL 98 96   CO2 28 31*    84   BUN 28* 27*   CREATININE 1.0 1.3   CALCIUM 9.0 9.0   ANIONGAP 10 9   ESTGFRAFRICA >60.0 >60.0   EGFRNONAA >60.0 58.1*   , CBC   Recent Labs   Lab 10/21/18  1022 10/22/18  0357   WBC 4.41 4.77   HGB 9.9* 11.1*   HCT 31.9* 36.5*   * 142*   , INR No results for input(s): INR, PROTIME in the last 48 hours. and Troponin   Recent Labs   Lab 10/21/18  2145 10/22/18  0357   TROPONINI 0.183* 0.200*       Significant Imaging: Echocardiogram:    2D echo with color flow doppler:   Results for orders placed or performed during the hospital encounter of 10/19/18   2D echo with color flow doppler   Result Value Ref Range    Calculated EF 23 (A) 55 - 65    Est. PA Systolic Pressure 51.72 (A)     Tricuspid Valve Regurgitation MODERATE (A)      Assessment and Plan:     Brief HPI: 64 yo M HFrEF, Aflutter, p/w ADHF     * Atrial flutter    Patient seen and examined full note to follow  63 M w ICM 20-25%, CAD s/p CABG and MR s/p MV replacement on AC, Alcohol abuse, VF/VT s/p BiV ICD p/w Aflutter and ADHF. Had change of mental status with -ve CT head, unable to get MRI due to leads.   - Since EP will not perform DCCV due to stroke risk and medication non-compliance (has not filled xarelto since 8/31) will have to use rate control  - Digoxin load and Beta blocker watching BP  - Continue AC may switch to Xarelto   - Agree with holding diuresis and reassess  - Will need to repeat TTE when patient HR normal (60-80) to better assess mean gradient (may be estimated due to tachycardia/Aflutter episode)         Mitral valve stenosis    Based on records review and review of images, the stenosis may not be severe. Am unsure that this is the cause of the heart failure exacerbation as much as his lifestyle and medication non-compliance.    - based on data available, would not recommend MVR     Acute on chronic systolic heart failure    .         VTE Risk Mitigation (From admission, onward)        Ordered     heparin 25,000 units in dextrose 5% 250 mL (100 units/mL) infusion LOW INTENSITY nomogram - [facility]  Continuous      10/19/18 1925     heparin 25,000 units in dextrose 5% (100 units/ml) IV bolus from bag - ADDITIONAL PRN BOLUS - 60 units/kg  As needed (PRN)      10/19/18 1925     heparin 25,000 units in dextrose 5% (100 units/ml) IV bolus from bag - ADDITIONAL PRN BOLUS - 30 units/kg  As needed (PRN)      10/19/18 1925          Debra Aguiar MD  Cardiology  [facility]

## 2018-10-23 NOTE — ASSESSMENT & PLAN NOTE
- Some vol overload today, JVD + , BLE, SOB  - Will restart Lasix 40 IV BID  - Watch electrolytes and aggresively replace   - If good response switch to PO toresmide soon  -  Watch BP

## 2018-10-23 NOTE — ASSESSMENT & PLAN NOTE
-EP plans to DCCV + NITA tommorow 10/24  - Digoxin load and cont Dig and Beta blocker watching BP  - Continue AC may switch to Xarelto   - Agree with holding diuresis and reassess  - Will need to repeat TTE when patient HR normal (60-80) to better assess mean gradient (may be estimated due to tachycardia/Aflutter episode)

## 2018-10-23 NOTE — SUBJECTIVE & OBJECTIVE
Neurologic Chief Complaint: Mental status changes    Subjective:     Interval History: Patient is seen for follow-up neurological assessment and treatment recommendations:  Remains on heparin gtt.  Plan for cardioversion at some point.  Recommending MRI after cardioversion.  Generalized slowing noted on EEG 10/20. No further episodes of incontinence, or mental status changes.    HPI, Past Medical, Family, and Social History remains the same as documented in the initial encounter.     Review of Systems   Constitutional: Negative for fever.   HENT: Negative for trouble swallowing.    Eyes: Negative for visual disturbance.   Neurological: Positive for weakness. Negative for facial asymmetry, speech difficulty and numbness.   Psychiatric/Behavioral: Negative for agitation.     Scheduled Meds:   albuterol-ipratropium  3 mL Nebulization Q6H WAKE    atorvastatin  40 mg Oral QHS    [START ON 10/24/2018] digoxin  0.125 mg Oral Daily    fluticasone-vilanterol  1 puff Inhalation Daily    furosemide  40 mg Intravenous BID    losartan  12.5 mg Oral Daily    metoprolol tartrate  25 mg Oral TID    nicotine  1 patch Transdermal Daily    ranolazine  500 mg Oral BID    rivaroxaban  20 mg Oral with dinner    senna-docusate 8.6-50 mg  1 tablet Oral BID     Continuous Infusions:  PRN Meds:acetaminophen, albuterol, dextrose 50%, dextrose 50%, GI cocktail (mylanta 30 mL, lidocaine 2 % viscous 10 mL, dicyclomine 10 mL) 50 mL, glucagon (human recombinant), glucose, glucose, heparin (PORCINE), heparin (PORCINE), nitroGLYCERIN, ondansetron, sodium chloride 0.9%, traMADol    Objective:     Vital Signs (Most Recent):  Temp: 98.4 °F (36.9 °C) (10/23/18 1522)  Pulse: 99 (10/23/18 1522)  Resp: 18 (10/23/18 1522)  BP: 91/64 (10/23/18 1522)  SpO2: 95 % (10/23/18 1522)  BP Location: Left arm    Vital Signs Range (Last 24H):  Temp:  [97.8 °F (36.6 °C)-98.7 °F (37.1 °C)]   Pulse:  []   Resp:  [17-18]   BP: ()/(64-80)   SpO2:  [94  %-99 %]   BP Location: Left arm    Physical Exam   Constitutional: He appears well-developed and well-nourished. No distress.   Pulmonary/Chest: Effort normal.   Skin: Skin is warm and dry.   Psychiatric: He has a normal mood and affect. His behavior is normal. Judgment and thought content normal.       Neurological Exam:   LOC: alert  Attention Span: Good   Language: No aphasia  Articulation: No dysarthria  Orientation: Person, Place, Time   Visual Fields: Full  EOM (CN III, IV, VI): Full/intact  Pupils (CN II, III): PERRL  Facial Sensation (CN V): Normal  Facial Movement (CN VII): Symmetric facial expression    Motor: Arm left  Normal 5/5  Leg left  Normal 5/5  Arm right  Normal 5/5  Leg right Paresis: 4/5  Cebellar: No evidence of appendicular or axial ataxia  Sensation: Intact to light touch, temperature and vibration  Tone: Normal tone throughout       Laboratory:  CMP:   Recent Labs   Lab 10/23/18  0502   CALCIUM 9.2   *   K 3.7   CO2 28   CL 98   BUN 25*   CREATININE 1.0     BMP:   Recent Labs   Lab 10/23/18  0502   *   K 3.7   CL 98   CO2 28   BUN 25*   CREATININE 1.0   CALCIUM 9.2     CBC:   Recent Labs   Lab 10/23/18  0502   WBC 4.20   RBC 3.07*   HGB 9.8*   HCT 30.8*   *   *   MCH 31.9*   MCHC 31.8*     Lipid Panel:   Recent Labs   Lab 10/20/18  0404   CHOL 98*   LDLCALC 60.2*   HDL 25*   TRIG 64     Coagulation:   Recent Labs   Lab 10/19/18  2113  10/23/18  0502   INR 1.4*  --   --    APTT 27.1   < > 44.0*    < > = values in this interval not displayed.     Hgb A1C:   Recent Labs   Lab 10/19/18  1358   HGBA1C 4.9     TSH:   Recent Labs   Lab 10/19/18  1358   TSH 0.550       Diagnostic Results     Brain Imaging   Brain imaging:  CTA Head and Neck 10/19/2018  No acute intracranial abnormality.   Focal area of less than 50% stenosis versus artifact in the V2 left vertebral artery.  Otherwise, no high-grade stenosis or major vessel occlusion within the neck vessels.  No evidence of  large vessel occlusion in the intracranial circulation.  Generalized cerebral volume loss.  Chronic microvascular ischemic changes.  Mild mucosal thickening in the left maxillary sinus and left ethmoid air cells.     2D echo -10/19/18  Moderately enlarged LA    1 - Severely depressed left ventricular systolic function (EF 20-25%).     2 - Concentric remodeling.     3 - Biatrial enlargement.     4 - Low normal to mildly depressed right ventricular systolic function .     5 - Mitral valve prosthesis.     6 - Moderate tricuspid regurgitation.     7 - Increased central venous pressure.     8 - Pulmonary hypertension. The estimated PA systolic pressure is 52 mmHg    EEG 10/21/2018  This is an abnormal C-EEG due to the mild generalized slowing seen, suggestive of mild diffuse or multifocal cerebral dysfunction.  No epileptiform activity or electrographic seizures seen.

## 2018-10-23 NOTE — SUBJECTIVE & OBJECTIVE
"Interval History:  Patient sitting upright in chair, episode 10/10 chest pain o/n given toradol with relief.  Urine "coke" colored, appears labs c/w hemolysis.  He looks euvolemic to dry, encouraged Po intake.  Updated on plan of care, no surgery as of yet, control a flutter and no NITA/ DCCV as he had recent stroke.  Though cardiology went back to tell him NITA/ DCCV back on will need to be told it's back off again as this change occurred after hours.     Review of Systems   Constitutional: Positive for activity change and fatigue. Negative for appetite change, chills and fever.   HENT: Negative for congestion and trouble swallowing.    Eyes: Negative for visual disturbance.   Respiratory: Positive for cough, chest tightness and shortness of breath. Negative for wheezing.         Productive green sputum cough this am   Cardiovascular: Positive for chest pain (patient also recent fall onto sternum d/t black episode while drinking). Negative for palpitations and leg swelling.   Gastrointestinal: Positive for abdominal distention and abdominal pain. Negative for constipation, nausea and vomiting.   Endocrine: Negative for cold intolerance and heat intolerance.   Genitourinary: Positive for decreased urine volume. Negative for difficulty urinating, frequency and hematuria.   Musculoskeletal: Positive for arthralgias, back pain and gait problem. Negative for myalgias and neck pain.        Uses rolling walker to mobilize around     Skin: Negative for color change, pallor and rash.        Sternal notch hematoma    Neurological: Positive for weakness. Negative for dizziness, syncope, light-headedness, numbness and headaches.   Hematological: Bruises/bleeds easily.   Psychiatric/Behavioral: Negative for agitation and confusion. The patient is not nervous/anxious.      Objective:     Vital Signs (Most Recent):  Temp: 98.5 °F (36.9 °C) (10/22/18 1958)  Pulse: 94 (10/22/18 1958)  Resp: 18 (10/22/18 1958)  BP: 103/67 (10/22/18 " 1958)  SpO2: 96 % (10/22/18 1958) Vital Signs (24h Range):  Temp:  [97.9 °F (36.6 °C)-98.7 °F (37.1 °C)] 98.5 °F (36.9 °C)  Pulse:  [] 94  Resp:  [14-20] 18  SpO2:  [95 %-100 %] 96 %  BP: ()/(61-75) 103/67     Weight: 109.5 kg (241 lb 6.5 oz)  Body mass index is 34.64 kg/m².    Intake/Output Summary (Last 24 hours) at 10/22/2018 2054  Last data filed at 10/22/2018 1749  Gross per 24 hour   Intake 1454.87 ml   Output 1150 ml   Net 304.87 ml      Physical Exam   Constitutional: He is oriented to person, place, and time. He appears well-developed and well-nourished. No distress.   HENT:   Head: Normocephalic and atraumatic.   Right Ear: External ear normal.   Left Ear: External ear normal.   Nose: Nose normal.   Mouth/Throat: Oropharynx is clear and moist.   Eyes: Conjunctivae and EOM are normal. No scleral icterus.   Neck: Normal range of motion. Neck supple. Hepatojugular reflux and JVD (pulsatile TR jet) present.   Cardiovascular: Normal rate, regular rhythm and intact distal pulses. Exam reveals no gallop and no friction rub.   Murmur (high pitched blowing III/VI mitral area) heard.  Pulmonary/Chest: Effort normal and breath sounds normal. No respiratory distress. He has no wheezes. He has no rales. He exhibits tenderness (d/t recent fall, reproducible with mild palpation).   Abdominal: Soft. Bowel sounds are normal. He exhibits distension. There is no tenderness.   Musculoskeletal: Normal range of motion. He exhibits no edema or tenderness.   Neurological: He is alert and oriented to person, place, and time. No cranial nerve deficit or sensory deficit. He exhibits normal muscle tone. Coordination (uses walker for mobility) abnormal.   Skin: Skin is warm and dry. No rash noted. No erythema.   Psychiatric: He has a normal mood and affect. His behavior is normal. Judgment and thought content normal.   Nursing note and vitals reviewed.      Significant Labs:   CBC:   Recent Labs   Lab 10/21/18  1022  10/22/18  0357   WBC 4.41 4.77   HGB 9.9* 11.1*   HCT 31.9* 36.5*   * 142*     CMP:   Recent Labs   Lab 10/21/18  0352 10/22/18  0357    136   K 3.5 3.6   CL 98 96   CO2 28 31*    84   BUN 28* 27*   CREATININE 1.0 1.3   CALCIUM 9.0 9.0   ANIONGAP 10 9   EGFRNONAA >60.0 58.1*     Coagulation:   Recent Labs   Lab 10/22/18  0357   APTT 51.3*       Significant Imaging: I have reviewed all pertinent imaging results/findings within the past 24 hours.

## 2018-10-23 NOTE — SUBJECTIVE & OBJECTIVE
Interval History: Doing well some SOB with working with PT no chest pain    Review of Systems   Constitution: Negative for chills, decreased appetite and diaphoresis.   HENT: Negative for congestion and ear discharge.    Eyes: Negative for blurred vision and discharge.   Cardiovascular: Negative for chest pain, dyspnea on exertion, irregular heartbeat, leg swelling and paroxysmal nocturnal dyspnea.   Respiratory: Negative for cough, hemoptysis and shortness of breath.    Gastrointestinal: Negative for abdominal pain.     Objective:     Vital Signs (Most Recent):  Temp: 98.4 °F (36.9 °C) (10/23/18 1522)  Pulse: 99 (10/23/18 1522)  Resp: 18 (10/23/18 1522)  BP: 91/64 (10/23/18 1522)  SpO2: 95 % (10/23/18 1522) Vital Signs (24h Range):  Temp:  [97.8 °F (36.6 °C)-98.7 °F (37.1 °C)] 98.4 °F (36.9 °C)  Pulse:  [] 99  Resp:  [17-18] 18  SpO2:  [94 %-99 %] 95 %  BP: ()/(64-80) 91/64     Weight: 109.5 kg (241 lb 6.5 oz)  Body mass index is 34.64 kg/m².     SpO2: 95 %  O2 Device (Oxygen Therapy): room air      Intake/Output Summary (Last 24 hours) at 10/23/2018 1750  Last data filed at 10/23/2018 1345  Gross per 24 hour   Intake 1407.23 ml   Output 2575 ml   Net -1167.77 ml       Lines/Drains/Airways     Peripheral Intravenous Line                 Peripheral IV - Single Lumen 10/19/18 0800 Left Antecubital 4 days         Peripheral IV - Single Lumen 10/19/18 0800 Right Wrist 4 days         Peripheral IV - Single Lumen 10/23/18 0630 Right Antecubital less than 1 day                Physical Exam   Constitutional: He is oriented to person, place, and time. He appears well-developed and well-nourished. No distress.   Eyes: Conjunctivae are normal. Pupils are equal, round, and reactive to light.   Neck: No tracheal deviation present. No thyromegaly present.   Cardiovascular: Regular rhythm and intact distal pulses. Tachycardia present. Exam reveals gallop and S3. Exam reveals no friction rub.   No murmur  heard.  Pulses:       Radial pulses are 2+ on the left side.        Femoral pulses are 2+ on the left side.  Pulmonary/Chest: Effort normal. No respiratory distress. He has no wheezes. He has rales.   Abdominal: Soft. Bowel sounds are normal. He exhibits no distension. There is no tenderness.   Musculoskeletal: He exhibits edema. He exhibits no deformity.   Neurological: He is alert and oriented to person, place, and time. No cranial nerve deficit. Coordination normal.   Skin: Skin is warm and dry. He is not diaphoretic.   Psychiatric: He has a normal mood and affect. His behavior is normal.       Significant Labs:   BMP:   Recent Labs   Lab 10/22/18  0357 10/23/18  0502   GLU 84 89    135*   K 3.6 3.7   CL 96 98   CO2 31* 28   BUN 27* 25*   CREATININE 1.3 1.0   CALCIUM 9.0 9.2   MG 1.9 1.7   , CMP   Recent Labs   Lab 10/22/18  0357 10/23/18  0502    135*   K 3.6 3.7   CL 96 98   CO2 31* 28   GLU 84 89   BUN 27* 25*   CREATININE 1.3 1.0   CALCIUM 9.0 9.2   ANIONGAP 9 9   ESTGFRAFRICA >60.0 >60.0   EGFRNONAA 58.1* >60.0   , CBC   Recent Labs   Lab 10/22/18  0357 10/23/18  0502   WBC 4.77 4.20   HGB 11.1* 9.8*   HCT 36.5* 30.8*   * 125*   , Lipid Panel No results for input(s): CHOL, HDL, LDLCALC, TRIG, CHOLHDL in the last 48 hours. and Troponin   Recent Labs   Lab 10/21/18  2145 10/22/18  0357   TROPONINI 0.183* 0.200*       Significant Imaging: Echocardiogram:   2D echo with color flow doppler:   Results for orders placed or performed during the hospital encounter of 10/19/18   2D echo with color flow doppler   Result Value Ref Range    Calculated EF 23 (A) 55 - 65    Est. PA Systolic Pressure 51.72 (A)     Tricuspid Valve Regurgitation MODERATE (A)

## 2018-10-23 NOTE — HOSPITAL COURSE
10/22: Patient back to baseline aphasia and left sided weakness resolved still in Aflutter, No chest pain or SOB.     10/23: DCCV/NITA Rescheduled for tommorow. Patient in Aflutter with mode switch pacing VVI, HR . Vol overloaded    10/24: Good urine output with lasix, less SOB awaiting DCCV/NITA then repeat Echo.     10/25: Good UOP, comfortable, no SOB, awaiting DCCV/NITA and repeat Echo    10/26: NITA showed clot in RAA and possibly on Bioprosthetic MV hence no DCCV at this time, will need to continue rate control strategy and HF medications.

## 2018-10-23 NOTE — ASSESSMENT & PLAN NOTE
- stroke code called 10/19, head ct negative, aphasic and L sided weakness have resolved  - [facility] neuro concerned it also may have been a seizure, eeg completed, no s/s of seizure activity  - Device MRI compatible, however mri only performs device mri's M-F with rep present and since he has an extra loose lead they will not perform period.  Dr. Melanie Dumont says data does not support, however cannot get MRI performed to confirm or deny recent stroke to account for aphasia and L sided weakness that has since resolved.    - He has absolutely no recollection of events of that time period.

## 2018-10-24 ENCOUNTER — ANESTHESIA (OUTPATIENT)
Dept: MEDSURG UNIT | Facility: HOSPITAL | Age: 63
DRG: 314 | End: 2018-10-24
Payer: MEDICARE

## 2018-10-24 LAB
ANION GAP SERPL CALC-SCNC: 8 MMOL/L
ANION GAP SERPL CALC-SCNC: 9 MMOL/L
APTT BLDCRRT: 28.2 SEC
BACTERIA SPEC AEROBE CULT: NORMAL
BASOPHILS # BLD AUTO: 0.01 K/UL
BASOPHILS # BLD AUTO: 0.02 K/UL
BASOPHILS NFR BLD: 0.2 %
BASOPHILS NFR BLD: 0.3 %
BUN SERPL-MCNC: 18 MG/DL
BUN SERPL-MCNC: 19 MG/DL
CALCIUM SERPL-MCNC: 9.1 MG/DL
CALCIUM SERPL-MCNC: 9.1 MG/DL
CHLORIDE SERPL-SCNC: 100 MMOL/L
CHLORIDE SERPL-SCNC: 100 MMOL/L
CO2 SERPL-SCNC: 27 MMOL/L
CO2 SERPL-SCNC: 28 MMOL/L
CREAT SERPL-MCNC: 0.9 MG/DL
CREAT SERPL-MCNC: 0.9 MG/DL
DIFFERENTIAL METHOD: ABNORMAL
DIFFERENTIAL METHOD: ABNORMAL
EOSINOPHIL # BLD AUTO: 0.1 K/UL
EOSINOPHIL # BLD AUTO: 0.1 K/UL
EOSINOPHIL NFR BLD: 1 %
EOSINOPHIL NFR BLD: 1.5 %
ERYTHROCYTE [DISTWIDTH] IN BLOOD BY AUTOMATED COUNT: 16.8 %
ERYTHROCYTE [DISTWIDTH] IN BLOOD BY AUTOMATED COUNT: 16.8 %
EST. GFR  (AFRICAN AMERICAN): >60 ML/MIN/1.73 M^2
EST. GFR  (AFRICAN AMERICAN): >60 ML/MIN/1.73 M^2
EST. GFR  (NON AFRICAN AMERICAN): >60 ML/MIN/1.73 M^2
EST. GFR  (NON AFRICAN AMERICAN): >60 ML/MIN/1.73 M^2
GLUCOSE SERPL-MCNC: 88 MG/DL
GLUCOSE SERPL-MCNC: 89 MG/DL
GRAM STN SPEC: NORMAL
HCT VFR BLD AUTO: 31.1 %
HCT VFR BLD AUTO: 31.9 %
HGB BLD-MCNC: 9.6 G/DL
HGB BLD-MCNC: 9.7 G/DL
IMM GRANULOCYTES # BLD AUTO: 0.02 K/UL
IMM GRANULOCYTES # BLD AUTO: 0.04 K/UL
IMM GRANULOCYTES NFR BLD AUTO: 0.4 %
IMM GRANULOCYTES NFR BLD AUTO: 0.7 %
LYMPHOCYTES # BLD AUTO: 1.2 K/UL
LYMPHOCYTES # BLD AUTO: 1.5 K/UL
LYMPHOCYTES NFR BLD: 26.2 %
LYMPHOCYTES NFR BLD: 26.4 %
MAGNESIUM SERPL-MCNC: 1.8 MG/DL
MCH RBC QN AUTO: 30.7 PG
MCH RBC QN AUTO: 31.2 PG
MCHC RBC AUTO-ENTMCNC: 30.4 G/DL
MCHC RBC AUTO-ENTMCNC: 30.9 G/DL
MCV RBC AUTO: 101 FL
MCV RBC AUTO: 101 FL
MONOCYTES # BLD AUTO: 0.7 K/UL
MONOCYTES # BLD AUTO: 1.1 K/UL
MONOCYTES NFR BLD: 15.3 %
MONOCYTES NFR BLD: 18.4 %
NEUTROPHILS # BLD AUTO: 2.6 K/UL
NEUTROPHILS # BLD AUTO: 3.1 K/UL
NEUTROPHILS NFR BLD: 53.4 %
NEUTROPHILS NFR BLD: 56.2 %
NRBC BLD-RTO: 0 /100 WBC
NRBC BLD-RTO: 0 /100 WBC
PLATELET # BLD AUTO: 130 K/UL
PLATELET # BLD AUTO: 142 K/UL
PMV BLD AUTO: 10.8 FL
PMV BLD AUTO: 11.2 FL
POTASSIUM SERPL-SCNC: 3.7 MMOL/L
POTASSIUM SERPL-SCNC: 3.9 MMOL/L
RBC # BLD AUTO: 3.08 M/UL
RBC # BLD AUTO: 3.16 M/UL
SODIUM SERPL-SCNC: 136 MMOL/L
SODIUM SERPL-SCNC: 136 MMOL/L
WBC # BLD AUTO: 4.59 K/UL
WBC # BLD AUTO: 5.76 K/UL

## 2018-10-24 PROCEDURE — 63600175 PHARM REV CODE 636 W HCPCS: Performed by: NURSE PRACTITIONER

## 2018-10-24 PROCEDURE — 93005 ELECTROCARDIOGRAM TRACING: CPT

## 2018-10-24 PROCEDURE — 20600001 HC STEP DOWN PRIVATE ROOM

## 2018-10-24 PROCEDURE — 93010 ELECTROCARDIOGRAM REPORT: CPT | Mod: ,,, | Performed by: INTERNAL MEDICINE

## 2018-10-24 PROCEDURE — 85730 THROMBOPLASTIN TIME PARTIAL: CPT

## 2018-10-24 PROCEDURE — 80048 BASIC METABOLIC PNL TOTAL CA: CPT

## 2018-10-24 PROCEDURE — 25000003 PHARM REV CODE 250: Performed by: NURSE PRACTITIONER

## 2018-10-24 PROCEDURE — 94640 AIRWAY INHALATION TREATMENT: CPT

## 2018-10-24 PROCEDURE — 83735 ASSAY OF MAGNESIUM: CPT

## 2018-10-24 PROCEDURE — 99233 SBSQ HOSP IP/OBS HIGH 50: CPT | Mod: ,,, | Performed by: NURSE PRACTITIONER

## 2018-10-24 PROCEDURE — 94761 N-INVAS EAR/PLS OXIMETRY MLT: CPT

## 2018-10-24 PROCEDURE — 99233 SBSQ HOSP IP/OBS HIGH 50: CPT | Mod: GC,,, | Performed by: INTERNAL MEDICINE

## 2018-10-24 PROCEDURE — 36415 COLL VENOUS BLD VENIPUNCTURE: CPT

## 2018-10-24 PROCEDURE — S4991 NICOTINE PATCH NONLEGEND: HCPCS | Performed by: NURSE PRACTITIONER

## 2018-10-24 PROCEDURE — 25000242 PHARM REV CODE 250 ALT 637 W/ HCPCS: Performed by: NURSE PRACTITIONER

## 2018-10-24 PROCEDURE — 80048 BASIC METABOLIC PNL TOTAL CA: CPT | Mod: 91

## 2018-10-24 PROCEDURE — 85025 COMPLETE CBC W/AUTO DIFF WBC: CPT

## 2018-10-24 RX ORDER — FUROSEMIDE 80 MG/1
80 TABLET ORAL 2 TIMES DAILY
Status: DISCONTINUED | OUTPATIENT
Start: 2018-10-25 | End: 2018-10-26

## 2018-10-24 RX ORDER — POTASSIUM CHLORIDE 750 MG/1
30 CAPSULE, EXTENDED RELEASE ORAL ONCE
Status: COMPLETED | OUTPATIENT
Start: 2018-10-24 | End: 2018-10-24

## 2018-10-24 RX ORDER — FUROSEMIDE 40 MG/1
80 TABLET ORAL 2 TIMES DAILY
Status: DISCONTINUED | OUTPATIENT
Start: 2018-10-24 | End: 2018-10-24

## 2018-10-24 RX ORDER — MAGNESIUM SULFATE HEPTAHYDRATE 40 MG/ML
2 INJECTION, SOLUTION INTRAVENOUS ONCE
Status: COMPLETED | OUTPATIENT
Start: 2018-10-24 | End: 2018-10-24

## 2018-10-24 RX ORDER — FUROSEMIDE 10 MG/ML
60 INJECTION INTRAMUSCULAR; INTRAVENOUS 2 TIMES DAILY
Status: DISCONTINUED | OUTPATIENT
Start: 2018-10-24 | End: 2018-10-24

## 2018-10-24 RX ADMIN — DIGOXIN 0.12 MG: 125 TABLET ORAL at 09:10

## 2018-10-24 RX ADMIN — RANOLAZINE 500 MG: 500 TABLET, FILM COATED, EXTENDED RELEASE ORAL at 09:10

## 2018-10-24 RX ADMIN — ATORVASTATIN CALCIUM 40 MG: 20 TABLET, FILM COATED ORAL at 08:10

## 2018-10-24 RX ADMIN — SENNOSIDES AND DOCUSATE SODIUM 1 TABLET: 8.6; 5 TABLET ORAL at 09:10

## 2018-10-24 RX ADMIN — IPRATROPIUM BROMIDE AND ALBUTEROL SULFATE 3 ML: .5; 3 SOLUTION RESPIRATORY (INHALATION) at 08:10

## 2018-10-24 RX ADMIN — FLUTICASONE FUROATE AND VILANTEROL TRIFENATATE 1 PUFF: 100; 25 POWDER RESPIRATORY (INHALATION) at 09:10

## 2018-10-24 RX ADMIN — IPRATROPIUM BROMIDE AND ALBUTEROL SULFATE 3 ML: .5; 3 SOLUTION RESPIRATORY (INHALATION) at 12:10

## 2018-10-24 RX ADMIN — LOSARTAN POTASSIUM 12.5 MG: 25 TABLET, FILM COATED ORAL at 09:10

## 2018-10-24 RX ADMIN — MAGNESIUM SULFATE IN WATER 2 G: 40 INJECTION, SOLUTION INTRAVENOUS at 09:10

## 2018-10-24 RX ADMIN — NICOTINE 1 PATCH: 21 PATCH, EXTENDED RELEASE TRANSDERMAL at 09:10

## 2018-10-24 RX ADMIN — FUROSEMIDE 40 MG: 10 INJECTION, SOLUTION INTRAMUSCULAR; INTRAVENOUS at 09:10

## 2018-10-24 RX ADMIN — METOPROLOL TARTRATE 25 MG: 25 TABLET ORAL at 09:10

## 2018-10-24 RX ADMIN — METOPROLOL TARTRATE 25 MG: 25 TABLET ORAL at 08:10

## 2018-10-24 RX ADMIN — RIVAROXABAN 20 MG: 20 TABLET, FILM COATED ORAL at 05:10

## 2018-10-24 RX ADMIN — FUROSEMIDE 60 MG: 10 INJECTION, SOLUTION INTRAMUSCULAR; INTRAVENOUS at 05:10

## 2018-10-24 RX ADMIN — RANOLAZINE 500 MG: 500 TABLET, FILM COATED, EXTENDED RELEASE ORAL at 08:10

## 2018-10-24 RX ADMIN — SENNOSIDES AND DOCUSATE SODIUM 1 TABLET: 8.6; 5 TABLET ORAL at 08:10

## 2018-10-24 RX ADMIN — POTASSIUM CHLORIDE 30 MEQ: 750 CAPSULE, EXTENDED RELEASE ORAL at 09:10

## 2018-10-24 RX ADMIN — METOPROLOL TARTRATE 25 MG: 25 TABLET ORAL at 03:10

## 2018-10-24 NOTE — ASSESSMENT & PLAN NOTE
-EP plans to DCCV + NITA today 10/24  - Digoxin load and cont Dig and Beta blocker watching BP  - Continue AC may switch to Xarelto   - Agree with holding diuresis and reassess  - Will need to repeat TTE when patient HR normal (60-80) to better assess mean gradient (may be estimated due to tachycardia/Aflutter episode)

## 2018-10-24 NOTE — PROGRESS NOTES
[facility]  Cardiac Electrophysiology  Progress Note    Admission Date: 10/19/2018  Code Status: Full Code   Attending Physician: Ebony Ram MD   Expected Discharge Date: 10/26/2018  Principal Problem:Atrial flutter    Subjective:     Interval History: Feels weak, improving shortness of breath    Tele: A-sensed V-paced HR , occasional PVC    ROS  Objective:     Vital Signs (Most Recent):  Temp: 98.4 °F (36.9 °C) (10/23/18 1522)  Pulse: 96 (10/23/18 2000)  Resp: 18 (10/23/18 2000)  BP: 91/64 (10/23/18 1522)  SpO2: 99 % (10/23/18 2000) Vital Signs (24h Range):  Temp:  [97.8 °F (36.6 °C)-98.7 °F (37.1 °C)] 98.4 °F (36.9 °C)  Pulse:  [] 96  Resp:  [17-18] 18  SpO2:  [94 %-99 %] 99 %  BP: ()/(64-80) 91/64     Weight: 109.5 kg (241 lb 6.5 oz)  Body mass index is 34.64 kg/m².     SpO2: 95 %  O2 Device (Oxygen Therapy): room air    Physical Exam   Constitutional: He is oriented to person, place, and time. He appears well-developed and well-nourished. No distress.   HENT:   Head: Normocephalic and atraumatic.   Mouth/Throat: Oropharynx is clear and moist.   Eyes: Conjunctivae and EOM are normal. Pupils are equal, round, and reactive to light. No scleral icterus.   Neck: Normal range of motion. Neck supple. JVD: difficult to discern JVD.   Cardiovascular: Regular rhythm and intact distal pulses. Tachycardia present.   Murmur heard.  Pulses:       Radial pulses are 2+ on the right side, and 2+ on the left side.   Pulmonary/Chest: Effort normal and breath sounds normal. No respiratory distress.   Symmetrical expansion, mild rales at the bases   Abdominal: Soft. Bowel sounds are normal. There is no hepatosplenomegaly. There is no tenderness.   Musculoskeletal: Normal range of motion. He exhibits no edema.   Bilateral edema 1-2+, with decreased skin turgor   Neurological: He is alert and oriented to person, place, and time. No cranial nerve deficit.   Skin: Skin is warm and dry. No rash  noted. He is not diaphoretic.   Psychiatric: He has a normal mood and affect. Judgment and thought content normal.       Significant Labs:   EP:   Recent Labs   Lab 10/22/18  0357 10/23/18  0502    135*   K 3.6 3.7   CL 96 98   CO2 31* 28   GLU 84 89   BUN 27* 25*   CREATININE 1.3 1.0   CALCIUM 9.0 9.2   ANIONGAP 9 9   ESTGFRAFRICA >60.0 >60.0   EGFRNONAA 58.1* >60.0   WBC 4.77 4.20   HGB 11.1* 9.8*   HCT 36.5* 30.8*   * 125*         Assessment and Plan:     * Atrial flutter    Atrial flutter, rate controlled in the setting of heart failure exacerbation due to medication non-compliance compounded by valvular disease of uncertain severity. Reviewed medication compliance with patient and he stresses the desire to comply with medicines from here on out. Neurology feels that anti-coagulation is appropriate in this patient, which would be appropriate for a DCCV plan.    - continue heparin gtt  - NPO after midnight  - will evaluate for NITA + DCCV in AM     Biventricular ICD (implantable cardioverter-defibrillator) in place    ICD is a [employer] Iperia 7 HF-T. S/N: [id number] (PID: [id number])         John Paul Isabel MD  Cardiac Electrophysiology  [facility]

## 2018-10-24 NOTE — PROGRESS NOTES
Patient's chart was reviewed by a stroke team provider.  Patient with no new symptoms documented.  Plan still for cardioversion.  Recommending MRI after cardioversion to fully assess patient's prior symptoms.  However, at this point the MRI will probably not .  He will be on anticoagulation for his afib and atorvastatin for his HLP providing him with appropriate secondary prevention.    There is no new imaging to review.  Pending diagnostics to follow up on include: MRI.  For other recommendations please see our previous note completed on: 10/23/2018.    There are no new recommendations at this time. Will continue to follow. Discussed patient with Dr. Tan. Please contact stroke team for any questions or concerns.     Preeti Page, NP-C  Vascular Neurology  [phone]

## 2018-10-24 NOTE — ASSESSMENT & PLAN NOTE
-Continur Lasix 40 IV BID  - Watch electrolytes and aggresively replace   - If good response switch to PO toresmide soon  -  Watch BP  - BB, ARB, dig

## 2018-10-24 NOTE — PLAN OF CARE
Problem: Patient Care Overview  Goal: Plan of Care Review  Outcome: Ongoing (interventions implemented as appropriate)  POC reviewed with pt with all questions answered. VSS and pt remains free from pain. Pt NPO after midnight. No disturbances to pt between [id number]. No chest pain reported. Pt receives 500mg ranolazine BID, 40mg lasix BID. Fall bundle implemented and pt remains fall free. Will continue to monitor.

## 2018-10-24 NOTE — PROGRESS NOTES
[facility] Hospital Medicine  Progress Note    Patient Name: Quentin Mckeon  MRN: [id number]  Patient Class: IP- Inpatient   Admission Date: 10/19/2018  Length of Stay: 4 days  Attending Physician: Ebony Ram MD  Primary Care Provider: [facility] Medicine Team: [facility] JAYLEEN Curry NP    Subjective:     Principal Problem:Atrial flutter    HPI:  Mr. Mckeon is a 63 year old male with past medical history of HFrEF (EF 35%), VF/VT s/p BiV AICD (extraction and reimplantation 2/2018 with some lead retention related to lead position under clavicle), CAD s/p CABG 2014, MV bioprosthetic repair in 2014 now with severe restenosis, AFib on Xarelto, tobacco use, and ~ daily ETOH use with most recent who presented to [facility] with SOB and chest pain x 3 days.  He was diagnosed with ADHF thought secondary to mitral bioprosthetic valve restenosis. Prior to admission at  he had a fall due to ETOH intoxication that resulted in chest trauma with subsequent small hematoma; device was interrogated finding normal impedance, During interrogation noted tachy episode that within VF zone 10/19 that was ATP'd with resolution and was in 2:1 Aflutter with rates ~100; OAC with xarelto was continued. He also had a recent prior admission at OSH for cough, body aches, productive cough, with negative flu swab. Due to multiple recent CHF admission thought secondary to mitral valve restenosis he was transferred to [facility] for evaluation by EP and CTS. He reports occasional dietary non compliance and has been out of home medications for ~a month and amiodarone for ~ 5 months. All past medical, social, and family history reviewed.     At admission CBC and chemistry stable, troponin 0.203 within historical baseline, , TSH 0.550, and HgA1c 4.9/94. The patient was admitted to the Hospital Medicine Service for further evaluation and management.     Hospital Course:  Mr. Mckeon was transferred  10/19 due to CHF exacerbation in setting of potential MV restenosis. 2D echo completed on arrival with EF 20-25% (decline from 30-35% previously), concentric remodeling, KRYSTAL, mildly depressed RV systolic function, moderate TR, pulmHTN with PASP 52, RAP 15, and MV with mean gradient obtained across the mitral valve is 10 mmHg, effective prosthetic valve area is 3.2 cm2, and effective prosthetic valve area corrected for BSA is 1.43 cm2. He was evaluated by CTS on arrival with recommendations to optimize from a heart failure standpoint with arrhythmia control as he is a poor candidate for redo surgery. Will repeat 2D echo once rate control or rhythm restoration occurs to establish true gradient. Due to atrial arrhythmia and known fractured ICD lead EP was consulted, recommended continuing OAC as surgical procedures are not indicated and continuing rate control with lopressor and dig load, pending NITA/DCCV in AM. Cardiology co-managed consulted and assisting with CHF management, will continue digoxin, lopressor, and losartan. Diuresing with IVP lasix, currently net negative 2.4 liters and weight decreasing.     After admission course complicated due to hypotension, AMS, and chest pain. Home medications resumed with resultant  hypotensive episode (likely due to known medication noncompliance) and associated episode of aphasia and Left sided weakness, stroke code was called; head CT negative. Neurology was also concerned for seizure; EEG negative for seizure activity. He has an MRI compatible device however due to safety concerns with unattached ICD lead scan not performed. He was given IVF bolus over several hours with minimal improvement in BPs and urine output (now resolved), nor was hydration complicated by pulmonary edema. He does not recall the events and has had no further neurological events since and remains alert and oriented. He also had two episodes of severe chest pain, rate 10/10 that was reproducible on  exam (likely related to chest trauma in setting of fall PTA), repeat EKG with v paced rhythm and troponin trend flat. Pain was managed with IV morphine and has since resolved.     Disposition plans: plan to DC home when medically ready, will assess candidacy for home health to assist in CHF monitoring and medication compliance. He will need outpt follow up with CTS, EP, and primary Cardiologist.     Interval History: Resting in recliner, reports ongoing LE swelling. He otherwise has no complaints or needs. He denies chest pain, palpitations, or shortness of breath. Denies any acute events or distress overnight.     Review of Systems   Constitutional: Positive for activity change and fatigue. Negative for appetite change, chills, diaphoresis, fever and unexpected weight change.   HENT: Negative for congestion, sinus pressure, sinus pain, sneezing and sore throat.    Respiratory: Negative for cough, chest tightness and shortness of breath.    Cardiovascular: Positive for leg swelling. Negative for chest pain and palpitations.   Gastrointestinal: Negative for abdominal distention, abdominal pain, constipation, diarrhea, nausea and vomiting.   Genitourinary: Positive for frequency. Negative for decreased urine volume, difficulty urinating and urgency.   Musculoskeletal: Positive for gait problem. Negative for arthralgias, back pain, joint swelling and myalgias.   Skin: Negative for color change, pallor, rash and wound.   Neurological: Negative for dizziness, syncope, weakness, light-headedness and headaches.     Objective:     Vital Signs (Most Recent):  Temp: 98.1 °F (36.7 °C) (10/24/18 1520)  Pulse: 78 (10/24/18 1520)  Resp: 18 (10/24/18 1520)  BP: 108/68 (10/24/18 1520)  SpO2: 95 % (10/24/18 1520) Vital Signs (24h Range):  Temp:  [97.6 °F (36.4 °C)-98.2 °F (36.8 °C)] 98.1 °F (36.7 °C)  Pulse:  [72-94] 78  Resp:  [18] 18  SpO2:  [95 %-98 %] 95 %  BP: ()/(54-70) 108/68     Weight: 107 kg (235 lb 14.3 oz)  Body  mass index is 33.85 kg/m².    Intake/Output Summary (Last 24 hours) at 10/23/2018 2023  Last data filed at 10/24/2018 1500  Gross per 24 hour   Intake 780 ml   Output 2950 mL   Net -2170 mL      Physical Exam   Constitutional: He is oriented to person, place, and time. He appears well-developed and well-nourished.   HENT:   Head: Normocephalic and atraumatic.   Mouth/Throat: Mucous membranes are normal. Normal dentition. No oropharyngeal exudate.   Eyes: Conjunctivae and lids are normal. Pupils are equal, round, and reactive to light. No scleral icterus.   Neck: Normal range of motion. Neck supple. JVD present.   Cardiovascular: Normal rate, regular rhythm and intact distal pulses. Exam reveals no gallop and no friction rub.   Murmur heard.  Pulmonary/Chest: Effort normal and breath sounds normal. He exhibits no tenderness.   Abdominal: Soft. Bowel sounds are normal. He exhibits no distension. There is no tenderness.   Musculoskeletal: Normal range of motion. He exhibits edema (BLE with 2+ pitting edema, mid shin to feet). He exhibits no deformity.   Neurological: He is alert and oriented to person, place, and time. No cranial nerve deficit.   Skin: Skin is warm and dry. Capillary refill takes 2 to 3 seconds. No rash noted. No erythema.   Psychiatric: He has a normal mood and affect. Judgment and thought content normal.   Nursing note and vitals reviewed.    Significant Labs:   CBC:   Lab 10/23/18  0502 10/24/18  0620   WBC 4.20 4.59   HGB 9.8* 9.7*   HCT 30.8* 31.9*   * 142*     CMP:   Lab 10/23/18  0502 10/24/18  0620   * 136   K 3.7 3.7   CL 98 100   CO2 28 27   GLU 89 89   BUN 25* 19   CREATININE 1.0 0.9   CALCIUM 9.2 9.1   ANIONGAP 9 9   EGFRNONAA >60.0 >60.0     Magnesium:   Lab 10/23/18  0502   MG 1.7     All pertinent labs within the past 24 hours have been reviewed.    Significant Imaging: I have reviewed all pertinent imaging results/findings within the past 24 hours.    Assessment/Plan:       * Acute on chronic systolic heart failure    -transferred 10/19 due to CHF exacerbation in setting of potential MV restenosis  -at admission  CBC and chemistry stable, troponin 0.203 within historical baseline, , TSH 0.550, and HgA1c 4.9/94  -2D echo completed on arrival with EF 20-25% (decline from 30-35% previously), concentric remodeling, KRYSTAL, mildly depressed RV systolic function, moderate TR, pulmHTN with PASP 52, RAP 15, and MV with mean gradient obtained across the mitral valve is 10 mmHg, effective prosthetic valve area is 3.2 cm2, and effective prosthetic valve area corrected for BSA is 1.43 cm2  -evaluated by CTS on arrival with recommendations to optimize from a heart failure standpoint with arrhythmia control as he is a poor candidate for redo surgery   -will repeat 2D echo once rate control or rhythm restoration occurs to establish true gradient  -due to atrial arrhythmia (likley contributing to CHF exacerbation) and known fractured ICD lead EP was consulted   -recommended continuing OAC as surgical procedures are not indicated and continuing rate control with lopressor and dig load   -pending NITA/DCCV in AM  -Cardiology co-managed consulted and assisting with CHF management   -will continue digoxin, lopressor, and losartan   -diuresing with IVP lasix, currently net negative 2.4 liters and weight decreasing >> monitor for ability to transition to PO   -hospital course complicated due to hypotension, AMS, and chest pain; home medications resumed with resultant hypotensive episode (likely due to known medication noncompliance) and associated episode of aphasia and Left sided weakness, stroke code was called   -head CT negative   -Neurology was also concerned for seizure; EEG negative for seizure activity   -has an MRI compatible device however due to safety concerns with unattached ICD lead scan not performed   -given IVF bolus over several hours with minimal improvement in BPs and urine output  (now resolved), nor was hydration complicated by pulmonary edema; does not recall the events and has had no further neurological events since and remains alert and oriented  -also had two episodes of severe chest pain, rate 10/10 that was reproducible on exam (likely due to chest trauma related to fall PTA), repeat EKG with v paced rhythm and troponin trend flat. Pain was managed with IV morphine and has since resolved  -continue tele monitoring  -cardiac diet with fluid restriction  -strict I&Os and daily weights  -will assess candidacy for home health to assist in CHF monitoring and medication compliance  -will need outpt follow up with CTS, EP, and primary Cardiologist     Atrial flutter    -see above for detailed plans  -continue lopressor, dig, and xarelto  -continue tele monitoring     Ventricular tachycardia, monomorphic    -see HPI  -no tele events during hospital course  -continue tele monitoring      Biventricular ICD (implantable cardioverter-defibrillator) in place    -see above     Mitral valve stenosis    -see above for detailed plans      Prosthetic mitral valve stenosis bioprosthetic 2014    -see above      Stroke vs seizure    -see above for detailed plans  -remains neuro intact       Coronary artery disease involving native coronary artery of native heart without angina pectoris    -troponins within historical baseline, no acute ischemic EKG changes  -continue ASA, statin, lopressor, losartan, and ranolazine      Chronic stable angina    -denies angina  -continue home medications as above      Hx of CABG 2014    -see above      Chronic anticoagulation    -continue OAC with xarelto  -see above for detailed arrhythmia plans      Benign essential HTN    -BP stable   -hypotensive episode when home medications resumed, see above  -continue lopressor and losartan  -continue holding hydralazine, ISMN, and entresto        HLD (hyperlipidemia)    -continue statin  -lipid panel with cholesterol 98, HDL 25,  LDL 60, and triglycerides 64  -cardiac diet in house       VTE Risk Mitigation (From admission, onward)        Ordered     rivaroxaban tablet 20 mg  with dinner      10/23/18 1601     heparin 25,000 units in dextrose 5% 250 mL (100 units/mL) infusion LOW INTENSITY nomogram - [facility]  Continuous      10/19/18 1925     heparin 25,000 units in dextrose 5% (100 units/ml) IV bolus from bag - ADDITIONAL PRN BOLUS - 60 units/kg  As needed (PRN)      10/19/18 1925     heparin 25,000 units in dextrose 5% (100 units/ml) IV bolus from bag - ADDITIONAL PRN BOLUS - 30 units/kg  As needed (PRN)      10/19/18 1925          Dai Curry, DNP, AG-ACNP, BC  Department of [facility] Medicine  [facility]  Pager [phone]  [facility] [id number]

## 2018-10-24 NOTE — SUBJECTIVE & OBJECTIVE
Interval History: Good urine output with lasix, less SOB awaiting DCCV/NITA then repeat Echo.     Review of Systems   Constitution: Negative for chills, decreased appetite and diaphoresis.   HENT: Negative for congestion and ear discharge.    Eyes: Negative for blurred vision and discharge.   Cardiovascular: Positive for dyspnea on exertion. Negative for chest pain, irregular heartbeat, leg swelling and paroxysmal nocturnal dyspnea.   Respiratory: Negative for cough, hemoptysis and shortness of breath.    Gastrointestinal: Negative for abdominal pain.            Objective:     Vital Signs (Most Recent):  Temp: 98.2 °F (36.8 °C) (10/24/18 1159)  Pulse: 72 (10/24/18 1159)  Resp: 18 (10/24/18 1159)  BP: 98/66 (10/24/18 1159)  SpO2: 97 % (10/24/18 1159) Vital Signs (24h Range):  Temp:  [97.6 °F (36.4 °C)-99.6 °F (37.6 °C)] 98.2 °F (36.8 °C)  Pulse:  [] 72  Resp:  [18] 18  SpO2:  [94 %-99 %] 97 %  BP: ()/(54-70) 98/66     Weight: 107 kg (235 lb 14.3 oz)  Body mass index is 33.85 kg/m².     SpO2: 97 %  O2 Device (Oxygen Therapy): room air      Intake/Output Summary (Last 24 hours) at 10/24/2018 1200  Last data filed at 10/24/2018 1100  Gross per 24 hour   Intake 420 ml   Output 3175 ml   Net -2755 ml       Lines/Drains/Airways     Peripheral Intravenous Line                 Peripheral IV - Single Lumen 10/19/18 0800 Left Antecubital 5 days         Peripheral IV - Single Lumen 10/19/18 0800 Right Wrist 5 days         Peripheral IV - Single Lumen 10/23/18 0630 Right Antecubital 1 day                Physical Exam   Constitutional: He is oriented to person, place, and time. He appears well-developed and well-nourished. No distress.   Eyes: Conjunctivae are normal. Pupils are equal, round, and reactive to light.   Neck: No tracheal deviation present. No thyromegaly present.   Cardiovascular: Regular rhythm and intact distal pulses. Tachycardia present. Exam reveals gallop and S3. Exam reveals no friction rub.   No  murmur heard.  Pulses:       Radial pulses are 2+ on the left side.        Femoral pulses are 2+ on the left side.  Pulmonary/Chest: Effort normal. No respiratory distress. He has no wheezes. He has no rales.   Abdominal: Soft. Bowel sounds are normal. He exhibits no distension. There is no tenderness.   Musculoskeletal: He exhibits edema. He exhibits no deformity.   Neurological: He is alert and oriented to person, place, and time. No cranial nerve deficit. Coordination normal.   Skin: Skin is warm and dry. He is not diaphoretic.   Psychiatric: He has a normal mood and affect. His behavior is normal.       Significant Labs:   BMP:   Recent Labs   Lab 10/23/18  0502 10/24/18  0620 10/24/18  0831   GLU 89 89 88   * 136 136   K 3.7 3.7 3.9   CL 98 100 100   CO2 28 27 28   BUN 25* 19 18   CREATININE 1.0 0.9 0.9   CALCIUM 9.2 9.1 9.1   MG 1.7 1.8  --    , CMP   Recent Labs   Lab 10/23/18  0502 10/24/18  0620 10/24/18  0831   * 136 136   K 3.7 3.7 3.9   CL 98 100 100   CO2 28 27 28   GLU 89 89 88   BUN 25* 19 18   CREATININE 1.0 0.9 0.9   CALCIUM 9.2 9.1 9.1   ANIONGAP 9 9 8   ESTGFRAFRICA >60.0 >60.0 >60.0   EGFRNONAA >60.0 >60.0 >60.0   , CBC   Recent Labs   Lab 10/23/18  0502 10/24/18  0620 10/24/18  0831   WBC 4.20 4.59 5.76   HGB 9.8* 9.7* 9.6*   HCT 30.8* 31.9* 31.1*   * 142* 130*   , Lipid Panel No results for input(s): CHOL, HDL, LDLCALC, TRIG, CHOLHDL in the last 48 hours. and Troponin No results for input(s): TROPONINI in the last 48 hours.    Significant Imaging: Echocardiogram:   2D echo with color flow doppler:   Results for orders placed or performed during the hospital encounter of 10/19/18   2D echo with color flow doppler   Result Value Ref Range    Calculated EF 23 (A) 55 - 65    Est. PA Systolic Pressure 51.72 (A)     Tricuspid Valve Regurgitation MODERATE (A)

## 2018-10-24 NOTE — PLAN OF CARE
In view of recent episode c/w TIA, would not plan on DCCV for at least 6 weeks -- unless it becomes emergent

## 2018-10-24 NOTE — PROGRESS NOTES
[facility]  Cardiology  Progress Note    Patient Name: Quentin Mckeon  MRN: [id number]  Admission Date: 10/19/2018  Hospital Length of Stay: 5 days  Code Status: Full Code   Attending Physician: Ebony Ram MD   Primary Care Physician: Primary Doctor No  Expected Discharge Date: 10/26/2018  Principal Problem:Atrial flutter    Subjective:     Hospital Course:   10/22: Patient back to baseline aphasia and left sided weakness resolved still in Aflutter, No chest pain or SOB.     10/23: DCCV/NITA Rescheduled for tommorow. Patient in Aflutter with mode switch pacing VVI, HR . Vol overloaded    10/24: Good urine output with lasix, less SOB awaiting DCCV/NITA then repeat Echo.     Interval History: Good urine output with lasix, less SOB awaiting DCCV/NITA then repeat Echo.     Review of Systems   Constitution: Negative for chills, decreased appetite and diaphoresis.   HENT: Negative for congestion and ear discharge.    Eyes: Negative for blurred vision and discharge.   Cardiovascular: Positive for dyspnea on exertion. Negative for chest pain, irregular heartbeat, leg swelling and paroxysmal nocturnal dyspnea.   Respiratory: Negative for cough, hemoptysis and shortness of breath.    Gastrointestinal: Negative for abdominal pain.            Objective:     Vital Signs (Most Recent):  Temp: 98.2 °F (36.8 °C) (10/24/18 1159)  Pulse: 72 (10/24/18 1159)  Resp: 18 (10/24/18 1159)  BP: 98/66 (10/24/18 1159)  SpO2: 97 % (10/24/18 1159) Vital Signs (24h Range):  Temp:  [97.6 °F (36.4 °C)-99.6 °F (37.6 °C)] 98.2 °F (36.8 °C)  Pulse:  [] 72  Resp:  [18] 18  SpO2:  [94 %-99 %] 97 %  BP: ()/(54-70) 98/66     Weight: 107 kg (235 lb 14.3 oz)  Body mass index is 33.85 kg/m².     SpO2: 97 %  O2 Device (Oxygen Therapy): room air      Intake/Output Summary (Last 24 hours) at 10/24/2018 1200  Last data filed at 10/24/2018 1100  Gross per 24 hour   Intake 420 ml   Output 3175 ml   Net -2755 ml        Lines/Drains/Airways     Peripheral Intravenous Line                 Peripheral IV - Single Lumen 10/19/18 0800 Left Antecubital 5 days         Peripheral IV - Single Lumen 10/19/18 0800 Right Wrist 5 days         Peripheral IV - Single Lumen 10/23/18 0630 Right Antecubital 1 day                Physical Exam   Constitutional: He is oriented to person, place, and time. He appears well-developed and well-nourished. No distress.   Eyes: Conjunctivae are normal. Pupils are equal, round, and reactive to light.   Neck: No tracheal deviation present. No thyromegaly present.   Cardiovascular: Regular rhythm and intact distal pulses. Tachycardia present. Exam reveals gallop and S3. Exam reveals no friction rub.   No murmur heard.  Pulses:       Radial pulses are 2+ on the left side.        Femoral pulses are 2+ on the left side.  Pulmonary/Chest: Effort normal. No respiratory distress. He has no wheezes. He has no rales.   Abdominal: Soft. Bowel sounds are normal. He exhibits no distension. There is no tenderness.   Musculoskeletal: He exhibits edema. He exhibits no deformity.   Neurological: He is alert and oriented to person, place, and time. No cranial nerve deficit. Coordination normal.   Skin: Skin is warm and dry. He is not diaphoretic.   Psychiatric: He has a normal mood and affect. His behavior is normal.       Significant Labs:   BMP:   Recent Labs   Lab 10/23/18  0502 10/24/18  0620 10/24/18  0831   GLU 89 89 88   * 136 136   K 3.7 3.7 3.9   CL 98 100 100   CO2 28 27 28   BUN 25* 19 18   CREATININE 1.0 0.9 0.9   CALCIUM 9.2 9.1 9.1   MG 1.7 1.8  --    , CMP   Recent Labs   Lab 10/23/18  0502 10/24/18  0620 10/24/18  0831   * 136 136   K 3.7 3.7 3.9   CL 98 100 100   CO2 28 27 28   GLU 89 89 88   BUN 25* 19 18   CREATININE 1.0 0.9 0.9   CALCIUM 9.2 9.1 9.1   ANIONGAP 9 9 8   ESTGFRAFRICA >60.0 >60.0 >60.0   EGFRNONAA >60.0 >60.0 >60.0   , CBC   Recent Labs   Lab 10/23/18  0502 10/24/18  0620  10/24/18  0831   WBC 4.20 4.59 5.76   HGB 9.8* 9.7* 9.6*   HCT 30.8* 31.9* 31.1*   * 142* 130*   , Lipid Panel No results for input(s): CHOL, HDL, LDLCALC, TRIG, CHOLHDL in the last 48 hours. and Troponin No results for input(s): TROPONINI in the last 48 hours.    Significant Imaging: Echocardiogram:   2D echo with color flow doppler:   Results for orders placed or performed during the hospital encounter of 10/19/18   2D echo with color flow doppler   Result Value Ref Range    Calculated EF 23 (A) 55 - 65    Est. PA Systolic Pressure 51.72 (A)     Tricuspid Valve Regurgitation MODERATE (A)      Assessment and Plan:     Brief HPI: 64 yo M HFrEF, Aflutter, p/w ADHF       * Atrial flutter      -EP plans to DCCV + NITA today 10/24  - Digoxin load and cont Dig and Beta blocker watching BP  - Continue AC may switch to Xarelto   - Agree with holding diuresis and reassess  - Will need to repeat TTE when patient HR normal (60-80) to better assess mean gradient (may be estimated due to tachycardia/Aflutter episode)         Mitral valve stenosis    - Repeat TTE after cardioversion to get accurate Mean gradient     Acute on chronic systolic heart failure    -Continur Lasix 40 IV BID  - Watch electrolytes and aggresively replace   - If good response switch to PO toresmide soon  -  Watch BP  - BB, ARB, dig         VTE Risk Mitigation (From admission, onward)        Ordered     rivaroxaban tablet 20 mg  with dinner      10/23/18 1601     heparin 25,000 units in dextrose 5% 250 mL (100 units/mL) infusion LOW INTENSITY nomogram - [facility]  Continuous      10/19/18 1925     heparin 25,000 units in dextrose 5% (100 units/ml) IV bolus from bag - ADDITIONAL PRN BOLUS - 60 units/kg  As needed (PRN)      10/19/18 1925     heparin 25,000 units in dextrose 5% (100 units/ml) IV bolus from bag - ADDITIONAL PRN BOLUS - 30 units/kg  As needed (PRN)      10/19/18 1925          Debra Aguiar MD  Cardiology  [facility]  [place]

## 2018-10-25 LAB
ANION GAP SERPL CALC-SCNC: 10 MMOL/L
BASOPHILS # BLD AUTO: 0.02 K/UL
BASOPHILS NFR BLD: 0.4 %
BUN SERPL-MCNC: 19 MG/DL
CALCIUM SERPL-MCNC: 9.1 MG/DL
CHLORIDE SERPL-SCNC: 102 MMOL/L
CO2 SERPL-SCNC: 26 MMOL/L
CREAT SERPL-MCNC: 1 MG/DL
DIFFERENTIAL METHOD: ABNORMAL
EOSINOPHIL # BLD AUTO: 0.1 K/UL
EOSINOPHIL NFR BLD: 1 %
ERYTHROCYTE [DISTWIDTH] IN BLOOD BY AUTOMATED COUNT: 17.3 %
EST. GFR  (AFRICAN AMERICAN): >60 ML/MIN/1.73 M^2
EST. GFR  (NON AFRICAN AMERICAN): >60 ML/MIN/1.73 M^2
GLUCOSE SERPL-MCNC: 86 MG/DL
HCT VFR BLD AUTO: 33.8 %
HGB BLD-MCNC: 10 G/DL
IMM GRANULOCYTES # BLD AUTO: 0.01 K/UL
IMM GRANULOCYTES NFR BLD AUTO: 0.2 %
LYMPHOCYTES # BLD AUTO: 1.1 K/UL
LYMPHOCYTES NFR BLD: 22 %
MAGNESIUM SERPL-MCNC: 1.8 MG/DL
MCH RBC QN AUTO: 30.8 PG
MCHC RBC AUTO-ENTMCNC: 29.6 G/DL
MCV RBC AUTO: 104 FL
MONOCYTES # BLD AUTO: 0.9 K/UL
MONOCYTES NFR BLD: 17.3 %
NEUTROPHILS # BLD AUTO: 2.9 K/UL
NEUTROPHILS NFR BLD: 59.1 %
NRBC BLD-RTO: 0 /100 WBC
PLATELET # BLD AUTO: 130 K/UL
PMV BLD AUTO: 11.3 FL
POTASSIUM SERPL-SCNC: 4.2 MMOL/L
RBC # BLD AUTO: 3.25 M/UL
SODIUM SERPL-SCNC: 138 MMOL/L
WBC # BLD AUTO: 4.92 K/UL

## 2018-10-25 PROCEDURE — 37000009 HC ANESTHESIA EA ADD 15 MINS: Performed by: INTERNAL MEDICINE

## 2018-10-25 PROCEDURE — 36415 COLL VENOUS BLD VENIPUNCTURE: CPT

## 2018-10-25 PROCEDURE — 93320 DOPPLER ECHO COMPLETE: CPT | Mod: 26,,, | Performed by: INTERNAL MEDICINE

## 2018-10-25 PROCEDURE — 37000008 HC ANESTHESIA 1ST 15 MINUTES: Performed by: INTERNAL MEDICINE

## 2018-10-25 PROCEDURE — 25000242 PHARM REV CODE 250 ALT 637 W/ HCPCS: Performed by: NURSE PRACTITIONER

## 2018-10-25 PROCEDURE — 25000003 PHARM REV CODE 250

## 2018-10-25 PROCEDURE — D9220A PRA ANESTHESIA: Mod: ANES,,, | Performed by: ANESTHESIOLOGY

## 2018-10-25 PROCEDURE — 93312 ECHO TRANSESOPHAGEAL: CPT

## 2018-10-25 PROCEDURE — 63600175 PHARM REV CODE 636 W HCPCS: Performed by: NURSE ANESTHETIST, CERTIFIED REGISTERED

## 2018-10-25 PROCEDURE — 63600175 PHARM REV CODE 636 W HCPCS: Performed by: NURSE PRACTITIONER

## 2018-10-25 PROCEDURE — S4991 NICOTINE PATCH NONLEGEND: HCPCS | Performed by: NURSE PRACTITIONER

## 2018-10-25 PROCEDURE — 99233 SBSQ HOSP IP/OBS HIGH 50: CPT | Mod: ,,, | Performed by: NURSE PRACTITIONER

## 2018-10-25 PROCEDURE — 25000003 PHARM REV CODE 250: Performed by: NURSE PRACTITIONER

## 2018-10-25 PROCEDURE — 20600001 HC STEP DOWN PRIVATE ROOM

## 2018-10-25 PROCEDURE — 94640 AIRWAY INHALATION TREATMENT: CPT

## 2018-10-25 PROCEDURE — 85025 COMPLETE CBC W/AUTO DIFF WBC: CPT

## 2018-10-25 PROCEDURE — 83735 ASSAY OF MAGNESIUM: CPT

## 2018-10-25 PROCEDURE — 25000003 PHARM REV CODE 250: Performed by: NURSE ANESTHETIST, CERTIFIED REGISTERED

## 2018-10-25 PROCEDURE — D9220A PRA ANESTHESIA: Mod: CRNA,,, | Performed by: NURSE ANESTHETIST, CERTIFIED REGISTERED

## 2018-10-25 PROCEDURE — 80048 BASIC METABOLIC PNL TOTAL CA: CPT

## 2018-10-25 PROCEDURE — 94761 N-INVAS EAR/PLS OXIMETRY MLT: CPT

## 2018-10-25 PROCEDURE — 93312 ECHO TRANSESOPHAGEAL: CPT | Mod: 26,,, | Performed by: INTERNAL MEDICINE

## 2018-10-25 PROCEDURE — 99233 SBSQ HOSP IP/OBS HIGH 50: CPT | Mod: GC,,, | Performed by: INTERNAL MEDICINE

## 2018-10-25 PROCEDURE — 99900035 HC TECH TIME PER 15 MIN (STAT)

## 2018-10-25 RX ORDER — IBUPROFEN 200 MG
1 TABLET ORAL DAILY
Refills: 0 | COMMUNITY
Start: 2018-10-26

## 2018-10-25 RX ORDER — ETOMIDATE 2 MG/ML
INJECTION INTRAVENOUS
Status: DISCONTINUED | OUTPATIENT
Start: 2018-10-25 | End: 2018-10-25

## 2018-10-25 RX ORDER — DIGOXIN 125 MCG
0.12 TABLET ORAL DAILY
Qty: 30 TABLET | Refills: 11 | Status: SHIPPED | OUTPATIENT
Start: 2018-10-26 | End: 2019-10-26

## 2018-10-25 RX ORDER — DIGOXIN 125 MCG
0.12 TABLET ORAL DAILY
Qty: 30 TABLET | Refills: 11 | Status: SHIPPED | OUTPATIENT
Start: 2018-10-26 | End: 2018-10-25

## 2018-10-25 RX ORDER — KETAMINE HCL IN 0.9 % NACL 50 MG/5 ML
SYRINGE (ML) INTRAVENOUS
Status: DISCONTINUED | OUTPATIENT
Start: 2018-10-25 | End: 2018-10-25

## 2018-10-25 RX ORDER — METOPROLOL SUCCINATE 25 MG/1
75 TABLET, EXTENDED RELEASE ORAL DAILY
Qty: 180 TABLET | Refills: 3 | Status: SHIPPED | OUTPATIENT
Start: 2018-10-25

## 2018-10-25 RX ORDER — IBUPROFEN 200 MG
1 TABLET ORAL DAILY
Refills: 0 | COMMUNITY
Start: 2018-10-26 | End: 2018-10-25

## 2018-10-25 RX ORDER — ESMOLOL HYDROCHLORIDE 10 MG/ML
INJECTION INTRAVENOUS
Status: DISCONTINUED | OUTPATIENT
Start: 2018-10-25 | End: 2018-10-25

## 2018-10-25 RX ORDER — LOSARTAN POTASSIUM 25 MG/1
12.5 TABLET ORAL DAILY
Qty: 45 TABLET | Refills: 3 | Status: SHIPPED | OUTPATIENT
Start: 2018-10-26 | End: 2018-10-26

## 2018-10-25 RX ORDER — MAGNESIUM SULFATE HEPTAHYDRATE 40 MG/ML
2 INJECTION, SOLUTION INTRAVENOUS ONCE
Status: COMPLETED | OUTPATIENT
Start: 2018-10-25 | End: 2018-10-25

## 2018-10-25 RX ORDER — FUROSEMIDE 80 MG/1
80 TABLET ORAL 2 TIMES DAILY
Qty: 60 TABLET | Refills: 11 | Status: SHIPPED | OUTPATIENT
Start: 2018-10-25 | End: 2018-10-26

## 2018-10-25 RX ORDER — SODIUM CHLORIDE 9 MG/ML
INJECTION, SOLUTION INTRAVENOUS CONTINUOUS PRN
Status: DISCONTINUED | OUTPATIENT
Start: 2018-10-25 | End: 2018-10-25

## 2018-10-25 RX ORDER — MIDAZOLAM HYDROCHLORIDE 1 MG/ML
INJECTION, SOLUTION INTRAMUSCULAR; INTRAVENOUS
Status: DISCONTINUED | OUTPATIENT
Start: 2018-10-25 | End: 2018-10-25

## 2018-10-25 RX ORDER — PHENYLEPHRINE HYDROCHLORIDE 10 MG/ML
INJECTION INTRAVENOUS
Status: DISCONTINUED | OUTPATIENT
Start: 2018-10-25 | End: 2018-10-25

## 2018-10-25 RX ORDER — LOSARTAN POTASSIUM 25 MG/1
12.5 TABLET ORAL DAILY
Qty: 45 TABLET | Refills: 3 | Status: SHIPPED | OUTPATIENT
Start: 2018-10-26 | End: 2018-10-25

## 2018-10-25 RX ORDER — PROPOFOL 10 MG/ML
VIAL (ML) INTRAVENOUS
Status: DISCONTINUED | OUTPATIENT
Start: 2018-10-25 | End: 2018-10-25

## 2018-10-25 RX ORDER — METOPROLOL SUCCINATE 25 MG/1
75 TABLET, EXTENDED RELEASE ORAL DAILY
Qty: 180 TABLET | Refills: 3 | Status: SHIPPED | OUTPATIENT
Start: 2018-10-25 | End: 2018-10-25 | Stop reason: SDUPTHER

## 2018-10-25 RX ADMIN — ATORVASTATIN CALCIUM 40 MG: 20 TABLET, FILM COATED ORAL at 08:10

## 2018-10-25 RX ADMIN — DIGOXIN 0.12 MG: 125 TABLET ORAL at 09:10

## 2018-10-25 RX ADMIN — Medication 20 MG: at 04:10

## 2018-10-25 RX ADMIN — MIDAZOLAM 2 MG: 1 INJECTION INTRAMUSCULAR; INTRAVENOUS at 04:10

## 2018-10-25 RX ADMIN — METOPROLOL TARTRATE 25 MG: 25 TABLET ORAL at 09:10

## 2018-10-25 RX ADMIN — PHENYLEPHRINE HYDROCHLORIDE 100 MCG: 10 INJECTION INTRAVENOUS at 05:10

## 2018-10-25 RX ADMIN — NICOTINE 1 PATCH: 21 PATCH, EXTENDED RELEASE TRANSDERMAL at 09:10

## 2018-10-25 RX ADMIN — METOPROLOL TARTRATE 25 MG: 25 TABLET ORAL at 02:10

## 2018-10-25 RX ADMIN — FLUTICASONE FUROATE AND VILANTEROL TRIFENATATE 1 PUFF: 100; 25 POWDER RESPIRATORY (INHALATION) at 10:10

## 2018-10-25 RX ADMIN — FUROSEMIDE 80 MG: 80 TABLET ORAL at 06:10

## 2018-10-25 RX ADMIN — RANOLAZINE 500 MG: 500 TABLET, FILM COATED, EXTENDED RELEASE ORAL at 09:10

## 2018-10-25 RX ADMIN — PROPOFOL 20 MG: 10 INJECTION, EMULSION INTRAVENOUS at 04:10

## 2018-10-25 RX ADMIN — PROPOFOL 30 MG: 10 INJECTION, EMULSION INTRAVENOUS at 04:10

## 2018-10-25 RX ADMIN — SODIUM CHLORIDE: 0.9 INJECTION, SOLUTION INTRAVENOUS at 04:10

## 2018-10-25 RX ADMIN — ESMOLOL HYDROCHLORIDE 20 MG: 10 INJECTION INTRAVENOUS at 04:10

## 2018-10-25 RX ADMIN — Medication 10 MG: at 05:10

## 2018-10-25 RX ADMIN — IPRATROPIUM BROMIDE AND ALBUTEROL SULFATE 3 ML: .5; 3 SOLUTION RESPIRATORY (INHALATION) at 08:10

## 2018-10-25 RX ADMIN — RIVAROXABAN 20 MG: 20 TABLET, FILM COATED ORAL at 06:10

## 2018-10-25 RX ADMIN — SENNOSIDES AND DOCUSATE SODIUM 1 TABLET: 8.6; 5 TABLET ORAL at 09:10

## 2018-10-25 RX ADMIN — LOSARTAN POTASSIUM 12.5 MG: 25 TABLET, FILM COATED ORAL at 09:10

## 2018-10-25 RX ADMIN — SENNOSIDES AND DOCUSATE SODIUM 1 TABLET: 8.6; 5 TABLET ORAL at 08:10

## 2018-10-25 RX ADMIN — RANOLAZINE 500 MG: 500 TABLET, FILM COATED, EXTENDED RELEASE ORAL at 08:10

## 2018-10-25 RX ADMIN — ETOMIDATE 10 MG: 2 INJECTION, SOLUTION INTRAVENOUS at 04:10

## 2018-10-25 RX ADMIN — MAGNESIUM SULFATE IN WATER 2 G: 40 INJECTION, SOLUTION INTRAVENOUS at 10:10

## 2018-10-25 RX ADMIN — IPRATROPIUM BROMIDE AND ALBUTEROL SULFATE 3 ML: .5; 3 SOLUTION RESPIRATORY (INHALATION) at 07:10

## 2018-10-25 RX ADMIN — METOPROLOL TARTRATE 25 MG: 25 TABLET ORAL at 08:10

## 2018-10-25 RX ADMIN — FUROSEMIDE 80 MG: 80 TABLET ORAL at 09:10

## 2018-10-25 NOTE — PLAN OF CARE
Problem: Patient Care Overview  Goal: Plan of Care Review  Outcome: Ongoing (interventions implemented as appropriate)  Pt is A, A, Ox4. Calm, cooperative. Free of falls, trauma, and injuries. Skin intact. Pt educated on treatment plan. Pt demonstrates and verbalizes understanding. VSS. Plan to have DCCV tomorrow. Plan of care reviewed with pt.

## 2018-10-25 NOTE — SUBJECTIVE & OBJECTIVE
Interval History: Patient feeling ok, tired as he did not sleep well, not happy about NPO.    Tele: AS +  in the setting of AF with HR     ROS  Objective:     Vital Signs (Most Recent):  Temp: 98.6 °F (37 °C) (10/25/18 0730)  Pulse: 101 (10/25/18 1100)  Resp: 18 (10/25/18 1002)  BP: 118/83 (10/25/18 0730)  SpO2: 97 % (10/25/18 0800) Vital Signs (24h Range):  Temp:  [97.5 °F (36.4 °C)-98.6 °F (37 °C)] 98.6 °F (37 °C)  Pulse:  [] 101  Resp:  [14-18] 18  SpO2:  [93 %-97 %] 97 %  BP: ()/(66-83) 118/83     Weight: 108.5 kg (239 lb 3.2 oz)  Body mass index is 34.32 kg/m².     SpO2: 97 %  O2 Device (Oxygen Therapy): room air    Physical Exam   Constitutional: He is oriented to person, place, and time. He appears well-developed and well-nourished. No distress.   HENT:   Head: Normocephalic and atraumatic.   Mouth/Throat: Oropharynx is clear and moist.   Eyes: Conjunctivae and EOM are normal. Pupils are equal, round, and reactive to light. No scleral icterus.   Neck: Normal range of motion. Neck supple. JVD: difficult to discern JVD.   Cardiovascular: Regular rhythm and intact distal pulses. Tachycardia present.   Murmur heard.  Pulses:       Radial pulses are 2+ on the right side, and 2+ on the left side.   Pulmonary/Chest: Effort normal and breath sounds normal. No respiratory distress.   Symmetrical expansion, mild rales at the bases   Abdominal: Soft. Bowel sounds are normal. There is no hepatosplenomegaly. There is no tenderness.   Musculoskeletal: Normal range of motion. He exhibits no edema.   Bilateral edema 1-2+, with decreased skin turgor   Neurological: He is alert and oriented to person, place, and time. No cranial nerve deficit.   Skin: Skin is warm and dry. No rash noted. He is not diaphoretic.   Psychiatric: He has a normal mood and affect. Judgment and thought content normal.       Significant Labs:   EP:   Recent Labs   Lab 10/24/18  0620 10/24/18  0831 10/25/18  0427    136 138   K  3.7 3.9 4.2    100 102   CO2 27 28 26   GLU 89 88 86   BUN 19 18 19   CREATININE 0.9 0.9 1.0   CALCIUM 9.1 9.1 9.1   ANIONGAP 9 8 10   ESTGFRAFRICA >60.0 >60.0 >60.0   EGFRNONAA >60.0 >60.0 >60.0   WBC 4.59 5.76 4.92   HGB 9.7* 9.6* 10.0*   HCT 31.9* 31.1* 33.8*   * 130* 130*

## 2018-10-25 NOTE — ASSESSMENT & PLAN NOTE
-continue statin  -lipid panel with cholesterol 98, HDL 25, LDL 60, and triglycerides 64  -cardiac diet in house

## 2018-10-25 NOTE — CONSULTS
[facility]  Cardiology  Consult Note    Patient Name: Quentin Mckeon  MRN: [id number]  Admission Date: 10/19/2018  Hospital Length of Stay: 6 days  Code Status: Full Code   Attending Provider: Ebony Ram MD   Consulting Provider: Walter Roa MD  Primary Care Physician: Primary Doctor No  Principal Problem:Acute on chronic systolic heart failure    Patient information was obtained from patient and past medical records.     Consults  Subjective:     Chief Complaint:  AFL     HPI:   Mr. 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  He was diagnosed with ADHF thought secondary to mitral bioprosthetic valve restenosis.        History reviewed. No pertinent past medical history.    Past Surgical History:   Procedure Laterality Date    EXTRACTION-LEAD N/A 2/12/2018    Performed by [facility] OR 2ND FLR    EXTRACTION-LEAD N/A 2/12/2018    Performed by [facility] at [facility] CATH LAB    INSERTION-ICD-BIVENTRICULAR N/A 2/12/2018    Performed by Jose [facility] at [facility] CATH LAB       Review of patient's allergies indicates:  No Known Allergies    No current facility-administered medications on file prior to encounter.      Current Outpatient Medications on File Prior to Encounter   Medication Sig    albuterol (PROAIR HFA) 90 mcg/actuation inhaler Inhale 2 puffs into the lungs every 6 (six) hours as needed for Wheezing. Rescue    atorvastatin (LIPITOR) 40 MG tablet Take 40 mg by mouth once daily.    budesonide-formoterol 160-4.5 mcg (SYMBICORT) 160-4.5 mcg/actuation HFAA Inhale 2 puffs into the  lungs every 12 (twelve) hours. Controller    furosemide (LASIX) 40 MG tablet Take 1 tablet (40 mg total) by mouth 2 (two) times daily. (Patient taking differently: Take 80 mg by mouth 2 (two) times daily. )    hydrALAZINE (APRESOLINE) 25 MG tablet Take 1 tablet (25 mg total) by mouth 3 (three) times daily. Do not take this medicine until you see your PCP in 1 week    isosorbide mononitrate (IMDUR) 30 MG 24 hr tablet Take 1 tablet (30 mg total) by mouth once daily.    magnesium oxide (MAG-OX) 400 mg (241.3 mg magnesium) tablet Take 400 mg by mouth once daily.    metOLazone (ZAROXOLYN) 5 MG tablet Take 5 mg by mouth as needed. Twice weekly as needed    metoprolol succinate (TOPROL-XL) 25 MG 24 hr tablet Take 12.5 mg by mouth once daily.    potassium chloride (KLOR-CON) 10 MEQ TbSR Take 20 mEq by mouth 3 (three) times daily. 2  Caps tid    ranolazine (RANEXA) 500 MG Tb12 Take 500 mg by mouth 2 (two) times daily.    rivaroxaban (XARELTO) 20 mg Tab Take 1 tablet (20 mg total) by mouth once daily. Do not take this medication for 5 days after procedure (restart on 2/18/2018) (Patient taking differently: Take 20 mg by mouth once daily. )    sacubitril-valsartan (ENTRESTO) 49-51 mg per tablet Take 1 tablet by mouth 2 (two) times daily.    acetaminophen (TYLENOL) 325 MG tablet Take 2 tablets (650 mg total) by mouth every 4 (four) hours as needed.    amiodarone (PACERONE) 200 MG Tab Take 1 tablet (200 mg total) by mouth 2 (two) times daily. Take 200mg twice a day until seen by Dr. Fitzgerald in clinic (Patient taking differently: Take 200 mg by mouth once daily. Take 200mg a day at home not listed in transfer med list, though he said he took it yesterday)    nitroGLYCERIN (NITROSTAT) 0.4 MG SL tablet Place 0.4 mg under the tongue every 5 (five) minutes as needed for Chest pain.     Family History     None        Tobacco Use    Smoking status: Current Every Day Smoker     Packs/day: 0.25     Years: 50.00     Pack  years: 12.50    Smokeless tobacco: Current User     Types: Snuff   Substance and Sexual Activity    Alcohol use: No    Drug use: No    Sexual activity: Not on file     Review of Systems   Constitution: Negative.   HENT: Negative.    Eyes: Negative.    Cardiovascular: Negative.    Respiratory: Positive for shortness of breath.    Endocrine: Negative.    Skin: Negative.    Musculoskeletal: Negative.    Gastrointestinal: Negative.    Genitourinary: Negative.    Neurological: Negative.      Objective:     Vital Signs (Most Recent):  Temp: 98.5 °F (36.9 °C) (10/25/18 1147)  Pulse: 76 (10/25/18 1147)  Resp: 16 (10/25/18 1147)  BP: 106/73 (10/25/18 1147)  SpO2: (!) 94 % (10/25/18 1147) Vital Signs (24h Range):  Temp:  [97.5 °F (36.4 °C)-98.6 °F (37 °C)] 98.5 °F (36.9 °C)  Pulse:  [] 76  Resp:  [14-18] 16  SpO2:  [93 %-97 %] 94 %  BP: (104-118)/(68-83) 106/73     Weight: 108.5 kg (239 lb 3.2 oz)  Body mass index is 34.32 kg/m².    SpO2: (!) 94 %  O2 Device (Oxygen Therapy): room air      Intake/Output Summary (Last 24 hours) at 10/25/2018 1438  Last data filed at 10/25/2018 1153  Gross per 24 hour   Intake 240 ml   Output 1051 ml   Net -811 ml       Lines/Drains/Airways     Peripheral Intravenous Line                 Peripheral IV - Single Lumen 10/19/18 0800 Left Antecubital 6 days         Peripheral IV - Single Lumen 10/19/18 0800 Right Wrist 6 days         Peripheral IV - Single Lumen 10/23/18 0630 Right Antecubital 2 days                Physical Exam   Constitutional: He is oriented to person, place, and time. He appears well-developed and well-nourished.   HENT:   Head: Normocephalic and atraumatic.   Eyes: Conjunctivae and EOM are normal. Pupils are equal, round, and reactive to light.   Neck: Normal range of motion. Neck supple. No JVD present.   Cardiovascular: Normal rate, regular rhythm and normal heart sounds. Exam reveals no gallop and no friction rub.   No murmur heard.  Pulmonary/Chest: Effort normal  and breath sounds normal. No respiratory distress. He has no wheezes. He has no rales. He exhibits no tenderness.   Abdominal: Soft. Bowel sounds are normal. He exhibits no distension. There is no tenderness.   Musculoskeletal: Normal range of motion. He exhibits no edema or tenderness.   Neurological: He is alert and oriented to person, place, and time.   Skin: Skin is warm and dry. No erythema. No pallor.       Significant Labs:   Recent Lab Results       10/25/18  0427        Immature Granulocytes 0.2     Immature Grans (Abs) 0.01  Comment:  Mild elevation in immature granulocytes is non specific and   can be seen in a variety of conditions including stress response,   acute inflammation, trauma and pregnancy. Correlation with other   laboratory and clinical findings is essential.       Anion Gap 10     Baso # 0.02     Basophil% 0.4     BUN, Bld 19     Calcium 9.1     Chloride 102     CO2 26     Creatinine 1.0     Differential Method Automated     eGFR if African American >60.0     eGFR if non  >60.0  Comment:  Calculation used to obtain the estimated glomerular filtration  rate (eGFR) is the CKD-EPI equation.        Eos # 0.1     Eosinophil% 1.0     Glucose 86     Gran # (ANC) 2.9     Gran% 59.1     Hematocrit 33.8     Hemoglobin 10.0     Lymph # 1.1     Lymph% 22.0     Magnesium 1.8     MCH 30.8     MCHC 29.6          Mono # 0.9     Mono% 17.3     MPV 11.3     nRBC 0     Platelets 130     Potassium 4.2     RBC 3.25     RDW 17.3     Sodium 138     WBC 4.92           Significant Imaging: as per hpi    Assessment and Plan:            Atrial flutter    1. NITA for evaluation of LA/RACHAEL and MV.    -The risks, benefits & alternatives of the procedure were explained to the patient.    -The risks of transesophageal echo include but are not limited to:  Dental trauma, esophageal trauma/perforation, bleeding, laryngospasm/brochospasm, aspiration, sore throat/hoarseness, & dislodgement of the  endotracheal tube/nasogastric tube (where applicable).    -The risks of moderate sedation include hypotension, respiratory depression, arrhythmias, bronchospasm, & death.    -Informed consent was obtained & the patient is agreeable to proceed with the procedure.    I will discuss with the attending physician. Attending addendum is to follow.     Further recommendations per attending addendum    Walter Roa MD  PGY-V Cardiology Fellow  [phone]       VTE Risk Mitigation (From admission, onward)        Ordered     rivaroxaban tablet 20 mg  with dinner      10/23/18 1601     heparin 25,000 units in dextrose 5% 250 mL (100 units/mL) infusion LOW INTENSITY nomogram - [facility]  Continuous      10/19/18 1925     heparin 25,000 units in dextrose 5% (100 units/ml) IV bolus from bag - ADDITIONAL PRN BOLUS - 60 units/kg  As needed (PRN)      10/19/18 1925     heparin 25,000 units in dextrose 5% (100 units/ml) IV bolus from bag - ADDITIONAL PRN BOLUS - 30 units/kg  As needed (PRN)      10/19/18 1925

## 2018-10-25 NOTE — ASSESSMENT & PLAN NOTE
-BP stable   -hypotensive episode when home medications resumed, see above  -continue lopressor and losartan  -continue holding hydralazine, ISMN, and entresto

## 2018-10-25 NOTE — SUBJECTIVE & OBJECTIVE
Interval History: Doing well awaiting NITA/DCCV    Review of Systems   Constitution: Negative for chills, decreased appetite and diaphoresis.   HENT: Negative for congestion and ear discharge.    Eyes: Negative for blurred vision and discharge.   Cardiovascular: Negative for chest pain, dyspnea on exertion, irregular heartbeat, leg swelling and paroxysmal nocturnal dyspnea.   Respiratory: Negative for cough, hemoptysis and shortness of breath.    Gastrointestinal: Negative for abdominal pain.        Objective:     Vital Signs (Most Recent):  Temp: 98.5 °F (36.9 °C) (10/25/18 1147)  Pulse: 76 (10/25/18 1147)  Resp: 16 (10/25/18 1147)  BP: 106/73 (10/25/18 1147)  SpO2: (!) 94 % (10/25/18 1147) Vital Signs (24h Range):  Temp:  [97.5 °F (36.4 °C)-98.6 °F (37 °C)] 98.5 °F (36.9 °C)  Pulse:  [] 76  Resp:  [14-18] 16  SpO2:  [93 %-97 %] 94 %  BP: (104-118)/(68-83) 106/73     Weight: 108.5 kg (239 lb 3.2 oz)  Body mass index is 34.32 kg/m².     SpO2: (!) 94 %  O2 Device (Oxygen Therapy): room air      Intake/Output Summary (Last 24 hours) at 10/25/2018 1350  Last data filed at 10/25/2018 1153  Gross per 24 hour   Intake 240 ml   Output 1051 ml   Net -811 ml       Lines/Drains/Airways     Peripheral Intravenous Line                 Peripheral IV - Single Lumen 10/19/18 0800 Left Antecubital 6 days         Peripheral IV - Single Lumen 10/19/18 0800 Right Wrist 6 days         Peripheral IV - Single Lumen 10/23/18 0630 Right Antecubital 2 days                Physical Exam   Constitutional: He is oriented to person, place, and time. He appears well-developed and well-nourished. No distress.   Eyes: Conjunctivae are normal. Pupils are equal, round, and reactive to light.   Neck: No tracheal deviation present. No thyromegaly present.   Cardiovascular: Normal rate, regular rhythm and intact distal pulses. Exam reveals gallop and S3. Exam reveals no friction rub.   No murmur heard.  Pulses:       Radial pulses are 2+ on the  left side.        Femoral pulses are 2+ on the left side.  Pulmonary/Chest: Effort normal. No respiratory distress. He has no wheezes. He has no rales.   Abdominal: Soft. Bowel sounds are normal. He exhibits no distension. There is no tenderness.   Musculoskeletal: He exhibits no edema or deformity.   Neurological: He is alert and oriented to person, place, and time. No cranial nerve deficit. Coordination normal.   Skin: Skin is warm and dry. He is not diaphoretic.   Psychiatric: He has a normal mood and affect. His behavior is normal.       Significant Labs:   BMP:   Recent Labs   Lab 10/24/18  0620 10/24/18  0831 10/25/18  0427   GLU 89 88 86    136 138   K 3.7 3.9 4.2    100 102   CO2 27 28 26   BUN 19 18 19   CREATININE 0.9 0.9 1.0   CALCIUM 9.1 9.1 9.1   MG 1.8  --  1.8   , CMP   Recent Labs   Lab 10/24/18  0620 10/24/18  0831 10/25/18  0427    136 138   K 3.7 3.9 4.2    100 102   CO2 27 28 26   GLU 89 88 86   BUN 19 18 19   CREATININE 0.9 0.9 1.0   CALCIUM 9.1 9.1 9.1   ANIONGAP 9 8 10   ESTGFRAFRICA >60.0 >60.0 >60.0   EGFRNONAA >60.0 >60.0 >60.0   , CBC   Recent Labs   Lab 10/24/18  0620 10/24/18  0831 10/25/18  0427   WBC 4.59 5.76 4.92   HGB 9.7* 9.6* 10.0*   HCT 31.9* 31.1* 33.8*   * 130* 130*   , Lipid Panel No results for input(s): CHOL, HDL, LDLCALC, TRIG, CHOLHDL in the last 48 hours. and Troponin No results for input(s): TROPONINI in the last 48 hours.    Significant Imaging: Echocardiogram:   2D echo with color flow doppler:   Results for orders placed or performed during the hospital encounter of 10/19/18   2D echo with color flow doppler   Result Value Ref Range    Calculated EF 23 (A) 55 - 65    Est. PA Systolic Pressure 51.72 (A)     Tricuspid Valve Regurgitation MODERATE (A)

## 2018-10-25 NOTE — ASSESSMENT & PLAN NOTE
-BP stable   -hypotensive episode when home medications resumed, see above  -continue lopressor and losartan  -continue holding hydralazine, ISMN, and entresto >>resumption in outpt setting as tolerated

## 2018-10-25 NOTE — PLAN OF CARE
Problem: Patient Care Overview  Goal: Plan of Care Review  Outcome: Ongoing (interventions implemented as appropriate)  Reviewed plan of care with patient.  NITA/Cardioversion planned for today.  Magnesium 1.8, Patient has remained free of falls/trauma/injury by using appropriate lighting, nonskid socks, and by keeping area free of debris.  Patient verbalized understanding of all instructions.

## 2018-10-25 NOTE — ASSESSMENT & PLAN NOTE
-troponins within historical baseline, no acute ischemic EKG changes  -continue ASA, statin, lopressor, losartan, and ranolazine

## 2018-10-25 NOTE — ASSESSMENT & PLAN NOTE
Atrial flutter, rate controlled in the setting of heart failure exacerbation due to medication non-compliance compounded by valvular disease of uncertain severity. Reviewed medication compliance with patient and he stresses the desire to comply with medicines from here on out. Neurology feels that anti-coagulation is appropriate in this patient, which would be appropriate for a DCCV plan.    - continue Xarelto  - plan for NITA/DCCV today (discussed MV assessment with NITA team)  - agree with lopressor/dig for rate control

## 2018-10-25 NOTE — PHYSICIAN QUERY
PT Name: Quentin Mckeon  MR #: [id number]    Physician Query Form - Atrial Flutter Specificity Clarification     CDS/: Darline Keita RN, CCDS              Contact information: [email or web address]  This form is a permanent document in the medical record.     Query Date: October 25, 2018    By submitting this query, we are merely seeking further clarification of documentation. Please utilize your independent clinical judgment when addressing the question(s) below.    The medical record contains the following:   Indicators     Supporting Clinical Findings Location in Medical Record   X Atrial Flutter Today on interrogation of device noted to be in atrial flutter  Interrogation also revealed what appears to be 2:1 flutter at rate of 100.     Atrial Flutter 10/19 h/p          10/19- 10/25 prog notes   X EKG results Ventricular-paced rhythm 10/19- 10/23 ekg's   X Medication Pt started on Digoxin for A Flutter    continue Xarelto   10/23 nurse note    10/25 prog note   X Treatment  plan for NITA/DCCV today  10/25 prog note    Other       Provider, please further specify the Atrial Flutter diagnosis.    [  ] Atypical  [  ] Type I  [  ] Type II  [X  ] Typical  [  ] Other (please specify): ___________________________________  [  ] Clinically Undetermined    Please document in your progress notes daily for the duration of treatment until resolved, and include in your discharge summary.

## 2018-10-25 NOTE — ASSESSMENT & PLAN NOTE
1. NITA for evaluation of LA/RACHAEL and MV.    -The risks, benefits & alternatives of the procedure were explained to the patient.    -The risks of transesophageal echo include but are not limited to:  Dental trauma, esophageal trauma/perforation, bleeding, laryngospasm/brochospasm, aspiration, sore throat/hoarseness, & dislodgement of the endotracheal tube/nasogastric tube (where applicable).    -The risks of moderate sedation include hypotension, respiratory depression, arrhythmias, bronchospasm, & death.    -Informed consent was obtained & the patient is agreeable to proceed with the procedure.    I will discuss with the attending physician. Attending addendum is to follow.     Further recommendations per attending addendum    Walter Roa MD  PGY-V Cardiology Fellow  [phone]

## 2018-10-25 NOTE — ANESTHESIA RELEASE NOTE
"Anesthesia Release from PACU Note    Patient: Quentin Mckeon    Procedure(s) Performed: Procedure(s) (LRB):  CARDIOVERSION (N/A)    Anesthesia type: general    Post pain: Adequate analgesia    Post assessment: no apparent anesthetic complications, tolerated procedure well and no evidence of recall    Last Vitals:   Visit Vitals  /67   Pulse 92   Temp 36.8 °C (98.2 °F) (Oral)   Resp 12   Ht 5' 10" (1.778 m)   Wt 108.5 kg (239 lb 3.2 oz)   SpO2 97%   BMI 34.32 kg/m²       Post vital signs: stable    Level of consciousness: awake, alert  and oriented    Nausea/Vomiting: no nausea/no vomiting    Complications: none    Airway Patency: patent    Respiratory: unassisted, spontaneous ventilation    Cardiovascular: stable and blood pressure at baseline    Hydration: euvolemic  "

## 2018-10-25 NOTE — ASSESSMENT & PLAN NOTE
-see above for detailed plans  -continue lopressor, dig, and xarelto  -maintenance dig level 10/27  -continue tele monitoring

## 2018-10-25 NOTE — SUBJECTIVE & OBJECTIVE
History reviewed. No pertinent past medical history.    Past Surgical History:   Procedure Laterality Date    EXTRACTION-LEAD N/A 2/12/2018    Performed by Jose Fitzgerald MD at [facility] OR 2ND FLR    EXTRACTION-LEAD N/A 2/12/2018    Performed by Jose Fitzgerald MD at [facility] CATH LAB    INSERTION-ICD-BIVENTRICULAR N/A 2/12/2018    Performed by Jose Fitzgerald MD at [facility] CATH LAB       Review of patient's allergies indicates:  No Known Allergies    No current facility-administered medications on file prior to encounter.      Current Outpatient Medications on File Prior to Encounter   Medication Sig    albuterol (PROAIR HFA) 90 mcg/actuation inhaler Inhale 2 puffs into the lungs every 6 (six) hours as needed for Wheezing. Rescue    atorvastatin (LIPITOR) 40 MG tablet Take 40 mg by mouth once daily.    budesonide-formoterol 160-4.5 mcg (SYMBICORT) 160-4.5 mcg/actuation HFAA Inhale 2 puffs into the lungs every 12 (twelve) hours. Controller    furosemide (LASIX) 40 MG tablet Take 1 tablet (40 mg total) by mouth 2 (two) times daily. (Patient taking differently: Take 80 mg by mouth 2 (two) times daily. )    hydrALAZINE (APRESOLINE) 25 MG tablet Take 1 tablet (25 mg total) by mouth 3 (three) times daily. Do not take this medicine until you see your PCP in 1 week    isosorbide mononitrate (IMDUR) 30 MG 24 hr tablet Take 1 tablet (30 mg total) by mouth once daily.    magnesium oxide (MAG-OX) 400 mg (241.3 mg magnesium) tablet Take 400 mg by mouth once daily.    metOLazone (ZAROXOLYN) 5 MG tablet Take 5 mg by mouth as needed. Twice weekly as needed    metoprolol succinate (TOPROL-XL) 25 MG 24 hr tablet Take 12.5 mg by mouth once daily.    potassium chloride (KLOR-CON) 10 MEQ TbSR Take 20 mEq by mouth 3 (three) times daily. 2  Caps tid    ranolazine (RANEXA) 500 MG Tb12 Take 500 mg by mouth 2 (two) times daily.    rivaroxaban (XARELTO) 20 mg Tab Take 1 tablet (20 mg total) by mouth once daily. Do not  take this medication for 5 days after procedure (restart on 2/18/2018) (Patient taking differently: Take 20 mg by mouth once daily. )    sacubitril-valsartan (ENTRESTO) 49-51 mg per tablet Take 1 tablet by mouth 2 (two) times daily.    acetaminophen (TYLENOL) 325 MG tablet Take 2 tablets (650 mg total) by mouth every 4 (four) hours as needed.    amiodarone (PACERONE) 200 MG Tab Take 1 tablet (200 mg total) by mouth 2 (two) times daily. Take 200mg twice a day until seen by Dr. Fitzgerald in clinic (Patient taking differently: Take 200 mg by mouth once daily. Take 200mg a day at home not listed in transfer med list, though he said he took it yesterday)    nitroGLYCERIN (NITROSTAT) 0.4 MG SL tablet Place 0.4 mg under the tongue every 5 (five) minutes as needed for Chest pain.     Family History     None        Tobacco Use    Smoking status: Current Every Day Smoker     Packs/day: 0.25     Years: 50.00     Pack years: 12.50    Smokeless tobacco: Current User     Types: Snuff   Substance and Sexual Activity    Alcohol use: No    Drug use: No    Sexual activity: Not on file     Review of Systems   Constitution: Negative.   HENT: Negative.    Eyes: Negative.    Cardiovascular: Negative.    Respiratory: Positive for shortness of breath.    Endocrine: Negative.    Skin: Negative.    Musculoskeletal: Negative.    Gastrointestinal: Negative.    Genitourinary: Negative.    Neurological: Negative.      Objective:     Vital Signs (Most Recent):  Temp: 98.5 °F (36.9 °C) (10/25/18 1147)  Pulse: 76 (10/25/18 1147)  Resp: 16 (10/25/18 1147)  BP: 106/73 (10/25/18 1147)  SpO2: (!) 94 % (10/25/18 1147) Vital Signs (24h Range):  Temp:  [97.5 °F (36.4 °C)-98.6 °F (37 °C)] 98.5 °F (36.9 °C)  Pulse:  [] 76  Resp:  [14-18] 16  SpO2:  [93 %-97 %] 94 %  BP: (104-118)/(68-83) 106/73     Weight: 108.5 kg (239 lb 3.2 oz)  Body mass index is 34.32 kg/m².    SpO2: (!) 94 %  O2 Device (Oxygen Therapy): room air      Intake/Output  Summary (Last 24 hours) at 10/25/2018 1438  Last data filed at 10/25/2018 1153  Gross per 24 hour   Intake 240 ml   Output 1051 ml   Net -811 ml       Lines/Drains/Airways     Peripheral Intravenous Line                 Peripheral IV - Single Lumen 10/19/18 0800 Left Antecubital 6 days         Peripheral IV - Single Lumen 10/19/18 0800 Right Wrist 6 days         Peripheral IV - Single Lumen 10/23/18 0630 Right Antecubital 2 days                Physical Exam   Constitutional: He is oriented to person, place, and time. He appears well-developed and well-nourished.   HENT:   Head: Normocephalic and atraumatic.   Eyes: Conjunctivae and EOM are normal. Pupils are equal, round, and reactive to light.   Neck: Normal range of motion. Neck supple. No JVD present.   Cardiovascular: Normal rate, regular rhythm and normal heart sounds. Exam reveals no gallop and no friction rub.   No murmur heard.  Pulmonary/Chest: Effort normal and breath sounds normal. No respiratory distress. He has no wheezes. He has no rales. He exhibits no tenderness.   Abdominal: Soft. Bowel sounds are normal. He exhibits no distension. There is no tenderness.   Musculoskeletal: Normal range of motion. He exhibits no edema or tenderness.   Neurological: He is alert and oriented to person, place, and time.   Skin: Skin is warm and dry. No erythema. No pallor.       Significant Labs:   Recent Lab Results       10/25/18  0427        Immature Granulocytes 0.2     Immature Grans (Abs) 0.01  Comment:  Mild elevation in immature granulocytes is non specific and   can be seen in a variety of conditions including stress response,   acute inflammation, trauma and pregnancy. Correlation with other   laboratory and clinical findings is essential.       Anion Gap 10     Baso # 0.02     Basophil% 0.4     BUN, Bld 19     Calcium 9.1     Chloride 102     CO2 26     Creatinine 1.0     Differential Method Automated     eGFR if African American >60.0     eGFR if non   >60.0  Comment:  Calculation used to obtain the estimated glomerular filtration  rate (eGFR) is the CKD-EPI equation.        Eos # 0.1     Eosinophil% 1.0     Glucose 86     Gran # (ANC) 2.9     Gran% 59.1     Hematocrit 33.8     Hemoglobin 10.0     Lymph # 1.1     Lymph% 22.0     Magnesium 1.8     MCH 30.8     MCHC 29.6          Mono # 0.9     Mono% 17.3     MPV 11.3     nRBC 0     Platelets 130     Potassium 4.2     RBC 3.25     RDW 17.3     Sodium 138     WBC 4.92           Significant Imaging: as per hpi

## 2018-10-25 NOTE — ASSESSMENT & PLAN NOTE
-EP plans to DCCV + NITA today 10/25  -  Can stop digoxin post DCCV, due to compliance issues and toxicity complications, we were using primarily for rate control of flutter  - Continue AC Xarelto   - Will need to repeat TTE when patient HR normal (60-80) to better assess mean gradient (may be estimated due to tachycardia/Aflutter episode)

## 2018-10-25 NOTE — PROGRESS NOTES
[facility]  Cardiology  Progress Note    Patient Name: Quentin Mckeon  MRN: [id number]  Admission Date: 10/19/2018  Hospital Length of Stay: 6 days  Code Status: Full Code   Attending Physician: Ebony Ram MD   Primary Care Physician: Primary Doctor No  Expected Discharge Date: 10/26/2018  Principal Problem:Acute on chronic systolic heart failure    Subjective:     Hospital Course:   10/22: Patient back to baseline aphasia and left sided weakness resolved still in Aflutter, No chest pain or SOB.     10/23: DCCV/NITA Rescheduled for tommorow. Patient in Aflutter with mode switch pacing VVI, HR . Vol overloaded    10/24: Good urine output with lasix, less SOB awaiting DCCV/NITA then repeat Echo.     10/25: Good UOP, comfortable, no SOB, awaiting DCCV/NITA and repeat Echo    Interval History: Doing well awaiting NITA/DCCV    Review of Systems   Constitution: Negative for chills, decreased appetite and diaphoresis.   HENT: Negative for congestion and ear discharge.    Eyes: Negative for blurred vision and discharge.   Cardiovascular: Negative for chest pain, dyspnea on exertion, irregular heartbeat, leg swelling and paroxysmal nocturnal dyspnea.   Respiratory: Negative for cough, hemoptysis and shortness of breath.    Gastrointestinal: Negative for abdominal pain.        Objective:     Vital Signs (Most Recent):  Temp: 98.5 °F (36.9 °C) (10/25/18 1147)  Pulse: 76 (10/25/18 1147)  Resp: 16 (10/25/18 1147)  BP: 106/73 (10/25/18 1147)  SpO2: (!) 94 % (10/25/18 1147) Vital Signs (24h Range):  Temp:  [97.5 °F (36.4 °C)-98.6 °F (37 °C)] 98.5 °F (36.9 °C)  Pulse:  [] 76  Resp:  [14-18] 16  SpO2:  [93 %-97 %] 94 %  BP: (104-118)/(68-83) 106/73     Weight: 108.5 kg (239 lb 3.2 oz)  Body mass index is 34.32 kg/m².     SpO2: (!) 94 %  O2 Device (Oxygen Therapy): room air      Intake/Output Summary (Last 24 hours) at 10/25/2018 1350  Last data filed at 10/25/2018 1153  Gross per 24 hour   Intake 240 ml    Output 1051 ml   Net -811 ml       Lines/Drains/Airways     Peripheral Intravenous Line                 Peripheral IV - Single Lumen 10/19/18 0800 Left Antecubital 6 days         Peripheral IV - Single Lumen 10/19/18 0800 Right Wrist 6 days         Peripheral IV - Single Lumen 10/23/18 0630 Right Antecubital 2 days                Physical Exam   Constitutional: He is oriented to person, place, and time. He appears well-developed and well-nourished. No distress.   Eyes: Conjunctivae are normal. Pupils are equal, round, and reactive to light.   Neck: No tracheal deviation present. No thyromegaly present.   Cardiovascular: Normal rate, regular rhythm and intact distal pulses. Exam reveals gallop and S3. Exam reveals no friction rub.   No murmur heard.  Pulses:       Radial pulses are 2+ on the left side.        Femoral pulses are 2+ on the left side.  Pulmonary/Chest: Effort normal. No respiratory distress. He has no wheezes. He has no rales.   Abdominal: Soft. Bowel sounds are normal. He exhibits no distension. There is no tenderness.   Musculoskeletal: He exhibits no edema or deformity.   Neurological: He is alert and oriented to person, place, and time. No cranial nerve deficit. Coordination normal.   Skin: Skin is warm and dry. He is not diaphoretic.   Psychiatric: He has a normal mood and affect. His behavior is normal.       Significant Labs:   BMP:   Recent Labs   Lab 10/24/18  0620 10/24/18  0831 10/25/18  0427   GLU 89 88 86    136 138   K 3.7 3.9 4.2    100 102   CO2 27 28 26   BUN 19 18 19   CREATININE 0.9 0.9 1.0   CALCIUM 9.1 9.1 9.1   MG 1.8  --  1.8   , CMP   Recent Labs   Lab 10/24/18  0620 10/24/18  0831 10/25/18  0427    136 138   K 3.7 3.9 4.2    100 102   CO2 27 28 26   GLU 89 88 86   BUN 19 18 19   CREATININE 0.9 0.9 1.0   CALCIUM 9.1 9.1 9.1   ANIONGAP 9 8 10   ESTGFRAFRICA >60.0 >60.0 >60.0   EGFRNONAA >60.0 >60.0 >60.0   , CBC   Recent Labs   Lab 10/24/18  0620  10/24/18  0831 10/25/18  0427   WBC 4.59 5.76 4.92   HGB 9.7* 9.6* 10.0*   HCT 31.9* 31.1* 33.8*   * 130* 130*   , Lipid Panel No results for input(s): CHOL, HDL, LDLCALC, TRIG, CHOLHDL in the last 48 hours. and Troponin No results for input(s): TROPONINI in the last 48 hours.    Significant Imaging: Echocardiogram:   2D echo with color flow doppler:   Results for orders placed or performed during the hospital encounter of 10/19/18   2D echo with color flow doppler   Result Value Ref Range    Calculated EF 23 (A) 55 - 65    Est. PA Systolic Pressure 51.72 (A)     Tricuspid Valve Regurgitation MODERATE (A)      Assessment and Plan:     Brief HPI:     * Acute on chronic systolic heart failure    -Agree with PO Lasix  - Watch electrolytes and aggresively replace   -  Watch BP  - Recommend switch to Metop Succinate 75mg qday now rate controlled for HF and continue ARB  - Can stop digoxin post DCCV before discharge, due to compliance issues and toxicity complications, we were using primarily for rate control of flutter     Atrial flutter      -EP plans to DCCV + NITA today 10/25  -  Can stop digoxin post DCCV, due to compliance issues and toxicity complications, we were using primarily for rate control of flutter  - Continue AC Xarelto   - Will need to repeat TTE when patient HR normal (60-80) to better assess mean gradient (may be estimated due to tachycardia/Aflutter episode)         Mitral valve stenosis    - Repeat TTE after cardioversion to get accurate Mean gradient         VTE Risk Mitigation (From admission, onward)        Ordered     rivaroxaban tablet 20 mg  with dinner      10/23/18 1601     heparin 25,000 units in dextrose 5% 250 mL (100 units/mL) infusion LOW INTENSITY nomogram - [facility]  Continuous      10/19/18 1925     heparin 25,000 units in dextrose 5% (100 units/ml) IV bolus from bag - ADDITIONAL PRN BOLUS - 60 units/kg  As needed (PRN)      10/19/18 1925     heparin 25,000 units in dextrose 5% (100  units/ml) IV bolus from bag - ADDITIONAL PRN BOLUS - 30 units/kg  As needed (PRN)      10/19/18 1925          Debra Aguiar MD  Cardiology  [facility]

## 2018-10-25 NOTE — PROGRESS NOTES
SIGN OFF NOTE    Cardiology will sign off but is available for any questions about Afib/HF management and will f/u TTE post DCCV tro joseph MS    Pls order rpeat TTE post DCCV today    Debra Dhaliwal MD (Cresencio)  Cardiology Fellow  PGY 4  [facility]  Pager - [phone]

## 2018-10-25 NOTE — PROGRESS NOTES
[facility] Hospital Medicine  Progress Note    Patient Name: Quentin Mckeon  MRN: [id number]  Patient Class: IP- Inpatient   Admission Date: 10/19/2018  Length of Stay: 6 days  Attending Physician: Ebony Ram MD  Primary Care Provider: [facility] Medicine Team: [facility] MED JAYLEEN Curry NP    Subjective:     Principal Problem:Acute on chronic systolic heart failure    HPI:  Mr. Mckeon is a 63 year old male with past medical history of HFrEF (EF 35%), VF/VT s/p BiV AICD (extraction and reimplantation 2/2018 with some lead retention related to lead position under clavicle), CAD s/p CABG 2014, MV bioprosthetic repair in 2014 now with severe restenosis, AFib on Xarelto, tobacco use, and ~ daily ETOH use with most recent who presented to [facility] with SOB and chest pain x 3 days.  He was diagnosed with ADHF thought secondary to mitral bioprosthetic valve restenosis. Prior to admission at  he had a fall due to ETOH intoxication that resulted in chest trauma with subsequent small hematoma; device was interrogated finding normal impedance, During interrogation noted tachy episode that within VF zone 10/19 that was ATP'd with resolution and was in 2:1 Aflutter with rates ~100; OAC with xarelto was continued. He also had a recent prior admission at OSH for cough, body aches, productive cough, with negative flu swab. Due to multiple recent CHF admission thought secondary to mitral valve restenosis he was transferred to [facility] for evaluation by EP and CTS. He reports occasional dietary non compliance and has been out of home medications for ~a month and amiodarone for ~ 5 months. All past medical, social, and family history reviewed.     At admission CBC and chemistry stable, troponin 0.203 within historical baseline, , TSH 0.550, and HgA1c 4.9/94. The patient was admitted to the Hospital Medicine Service for further evaluation and management.     Hospital  Course:  Mr. Mckeon was transferred 10/19 due to CHF exacerbation in setting of potential MV restenosis. 2D echo completed on arrival with EF 20-25% (decline from 30-35% previously), concentric remodeling, KRYSTAL, mildly depressed RV systolic function, moderate TR, pulmHTN with PASP 52, RAP 15, and MV with mean gradient obtained across the mitral valve is 10 mmHg, effective prosthetic valve area is 3.2 cm2, and effective prosthetic valve area corrected for BSA is 1.43 cm2. He was evaluated by CTS on arrival with recommendations to optimize from a heart failure standpoint with arrhythmia control as he is a poor candidate for redo surgery. Will repeat 2D echo once rate control or rhythm restoration occurs to establish true gradient. Due to atrial arrhythmia and known fractured ICD lead EP was consulted, recommended continuing OAC as surgical procedures are not indicated and continuing rate control with lopressor and dig load, pending NITA/DCCV in today. Cardiology co-managed consulted and assisted with CHF management, will continue digoxin, lopressor, and losartan. Diuresed with IVP lasix, currently net negative 4.5 liters and weight decreased, physical exam has improved >> transitioned to PO maintenance dose.    After admission course complicated due to hypotension, AMS, and chest pain. Home medications resumed with resultant  hypotensive episode (likely due to known medication noncompliance) and associated episode of aphasia and Left sided weakness, stroke code was called; head CT negative. Neurology was also concerned for seizure; EEG negative for seizure activity. He has an MRI compatible device however due to safety concerns with unattached ICD lead scan not performed. He was given IVF bolus over several hours with minimal improvement in BPs and urine output (now resolved), nor was hydration complicated by pulmonary edema. He does not recall the events and has had no further neurological events since and remains  alert and oriented. He also had two episodes of severe chest pain, rate 10/10 that was reproducible on exam (likely related to chest trauma in setting of fall PTA), repeat EKG with v paced rhythm and troponin trend flat. Pain was managed with IV morphine and has since resolved.     Disposition plans: plan to DC home when medically ready, home health to assist in CHF monitoring and medication compliance at DC. He will need outpt follow up with CTS, EP, and primary Cardiologist.     Interval History: Resting in bed, is awaiting DCCV and is frustrated with extended NPO status. He endorses BLE edema has improved and is at baseline. He denies chest pain, palpitations, or shortness of breath. Denies any acute events or distress overnight.     Review of Systems   Constitutional: Positive for activity change and fatigue. Negative for appetite change, chills, diaphoresis, fever and unexpected weight change.   HENT: Negative for congestion, sinus pressure, sinus pain, sneezing and sore throat.    Respiratory: Negative for cough, chest tightness and shortness of breath.    Cardiovascular: Positive for leg swelling (at baseline). Negative for chest pain and palpitations.   Gastrointestinal: Negative for abdominal distention, abdominal pain, constipation, diarrhea, nausea and vomiting.   Genitourinary: Negative for decreased urine volume, difficulty urinating, frequency and urgency.   Musculoskeletal: Positive for gait problem. Negative for arthralgias, back pain, joint swelling and myalgias.   Skin: Negative for color change, pallor, rash and wound.   Neurological: Negative for dizziness, syncope, weakness, light-headedness and headaches.     Objective:     Vital Signs (Most Recent):  Temp: 98.2 °F (36.8 °C) (10/25/18 1457)  Pulse: 88 (10/25/18 1457)  Resp: 16 (10/25/18 1457)  BP: 118/85 (10/25/18 1457)  SpO2: 100 % (10/25/18 1457) Vital Signs (24h Range):  Temp:  [97.5 °F (36.4 °C)-98.6 °F (37 °C)] 98.2 °F (36.8 °C)  Pulse:   [] 88  Resp:  [14-18] 16  SpO2:  [93 %-100 %] 100 %  BP: (104-118)/(68-85) 118/85     Weight: 108.5 kg (239 lb 3.2 oz)  Body mass index is 34.32 kg/m².    Intake/Output Summary (Last 24 hours) at 10/25/2018 1504  Last data filed at 10/25/2018 1459  Gross per 24 hour   Intake 240 ml   Output 1001 ml   Net -761 ml      Physical Exam   Constitutional: He is oriented to person, place, and time. He appears well-developed and well-nourished.   HENT:   Head: Normocephalic and atraumatic.   Mouth/Throat: Mucous membranes are normal. Normal dentition. No oropharyngeal exudate.   Eyes: Conjunctivae and lids are normal. Pupils are equal, round, and reactive to light. No scleral icterus.   Neck: Normal range of motion. Neck supple. No JVD present.   Cardiovascular: Normal rate, regular rhythm and intact distal pulses. Exam reveals no gallop and no friction rub.   Murmur heard.  Pulmonary/Chest: Effort normal and breath sounds normal. He exhibits no tenderness.   Abdominal: Soft. Bowel sounds are normal. He exhibits no distension. There is no tenderness.   Musculoskeletal: Normal range of motion. He exhibits edema (BLE non pitting, ankles/feet). He exhibits no deformity.   Neurological: He is alert and oriented to person, place, and time. No cranial nerve deficit.   Skin: Skin is warm and dry. Capillary refill takes 2 to 3 seconds. No rash noted. No erythema.   Psychiatric: He has a normal mood and affect. Judgment and thought content normal.   Nursing note and vitals reviewed.    Significant Labs:   CBC:   Recent Labs   Lab 10/24/18  0620 10/24/18  0831 10/25/18  0427   WBC 4.59 5.76 4.92   HGB 9.7* 9.6* 10.0*   HCT 31.9* 31.1* 33.8*   * 130* 130*     CMP:   Recent Labs   Lab 10/24/18  0620 10/24/18  0831 10/25/18  0427    136 138   K 3.7 3.9 4.2    100 102   CO2 27 28 26   GLU 89 88 86   BUN 19 18 19   CREATININE 0.9 0.9 1.0   CALCIUM 9.1 9.1 9.1   ANIONGAP 9 8 10   EGFRNONAA >60.0 >60.0 >60.0      Magnesium:   Recent Labs   Lab 10/24/18  0620 10/25/18  0427   MG 1.8 1.8     All pertinent labs within the past 24 hours have been reviewed.    Significant Imaging: I have reviewed all pertinent imaging results/findings within the past 24 hours.    Assessment/Plan:      * Acute on chronic systolic heart failure    -transferred 10/19 due to CHF exacerbation in setting of potential MV restenosis  -at admission  CBC and chemistry stable, troponin 0.203 within historical baseline, , TSH 0.550, and HgA1c 4.9/94  -2D echo completed on arrival with EF 20-25% (decline from 30-35% previously), concentric remodeling, KRYSTAL, mildly depressed RV systolic function, moderate TR, pulmHTN with PASP 52, RAP 15, and MV with mean gradient obtained across the mitral valve is 10 mmHg, effective prosthetic valve area is 3.2 cm2, and effective prosthetic valve area corrected for BSA is 1.43 cm2  -evaluated by CTS on arrival with recommendations to optimize from a heart failure standpoint with arrhythmia control as he is a poor candidate for redo surgery   -will repeat 2D echo once rate control or rhythm restoration occurs to establish true gradient  -due to atrial arrhythmia (likley contributing to CHF exacerbation) and known fractured ICD lead EP was consulted   -recommended continuing OAC as surgical procedures are not indicated and continuing rate control with lopressor and dig load   -pending NITA/DCCV today  -Cardiology co-managed consulted and assisting with CHF management   -will continue digoxin, lopressor, and losartan   -diuresing with IVP lasix, currently net negative 4.5 liters and weight decreased with improved physical exam >> transitioned to PO maintenance dose   -hospital course complicated due to hypotension, AMS, and chest pain; home medications resumed with resultant hypotensive episode (likely due to known medication noncompliance) and associated episode of aphasia and Left sided weakness, stroke code was  called   -head CT negative   -Neurology was also concerned for seizure; EEG negative for seizure activity   -has an MRI compatible device however due to safety concerns with unattached ICD lead scan not performed   -given IVF bolus over several hours with minimal improvement in BPs and urine output (now resolved), nor was hydration complicated by pulmonary edema; does not recall the events and has had no further neurological events since and remains alert and oriented  -also had two episodes of severe chest pain, rate 10/10 that was reproducible on exam (likely due to chest trauma related to fall PTA), repeat EKG with v paced rhythm and troponin trend flat. Pain was managed with IV morphine and has since resolved  -continue tele monitoring  -cardiac diet with fluid restriction  -strict I&Os and daily weights  -will assess candidacy for home health to assist in CHF monitoring and medication compliance  -will need outpt follow up with CTS, EP, and primary Cardiologist     Atrial flutter    -see above for detailed plans  -continue lopressor, dig, and xarelto  -maintenance dig level 10/27  -continue tele monitoring     Ventricular tachycardia, monomorphic    -see HPI  -no tele events during hospital course  -continue tele monitoring      Biventricular ICD (implantable cardioverter-defibrillator) in place    -see above     Mitral valve stenosis    -see above for detailed plans      Prosthetic mitral valve stenosis bioprosthetic 2014    -see above      Stroke vs seizure    -see above for detailed plans  -remains neuro intact       Coronary artery disease involving native coronary artery of native heart without angina pectoris    -troponins within historical baseline, no acute ischemic EKG changes  -continue ASA, statin, lopressor, losartan, and ranolazine      Chronic stable angina    -denies angina  -continue home medications as above      Hx of CABG 2014    -see above      Chronic anticoagulation    -continue OAC with  xarelto  -see above for detailed arrhythmia plans      Benign essential HTN    -BP stable   -hypotensive episode when home medications resumed, see above  -continue lopressor and losartan  -continue holding hydralazine, ISMN, and entresto >>resumption in outpt setting as tolerated      HLD (hyperlipidemia)    -continue statin  -lipid panel with cholesterol 98, HDL 25, LDL 60, and triglycerides 64  -cardiac diet in house       VTE Risk Mitigation (From admission, onward)        Ordered     rivaroxaban tablet 20 mg  with dinner      10/23/18 1601     heparin 25,000 units in dextrose 5% 250 mL (100 units/mL) infusion LOW INTENSITY nomogram - [facility]  Continuous      10/19/18 1925     heparin 25,000 units in dextrose 5% (100 units/ml) IV bolus from bag - ADDITIONAL PRN BOLUS - 60 units/kg  As needed (PRN)      10/19/18 1925     heparin 25,000 units in dextrose 5% (100 units/ml) IV bolus from bag - ADDITIONAL PRN BOLUS - 30 units/kg  As needed (PRN)      10/19/18 1925          Dai Curry, DNP, AG-ACNP, BC  Department of Hospital Medicine  [facility]  Pager [phone]  [facility] [id number]

## 2018-10-25 NOTE — PROGRESS NOTES
Patient transfer from CTSU to CSU via stretcher with tele monitor on, IV's clean, dry and intact with no redness at site.  VSS, NAD noted. All questions answered. Will continue to monitor.

## 2018-10-25 NOTE — PROGRESS NOTES
[facility]  Cardiac Electrophysiology  Progress Note    Admission Date: 10/19/2018  Code Status: Full Code   Attending Physician: Ebony Ram MD   Expected Discharge Date: 10/26/2018  Principal Problem:Acute on chronic systolic heart failure    Subjective:     Interval History: Patient feeling ok, tired as he did not sleep well, not happy about NPO.    Tele: AS +  in the setting of AF with HR     ROS  Objective:     Vital Signs (Most Recent):  Temp: 98.6 °F (37 °C) (10/25/18 0730)  Pulse: 101 (10/25/18 1100)  Resp: 18 (10/25/18 1002)  BP: 118/83 (10/25/18 0730)  SpO2: 97 % (10/25/18 0800) Vital Signs (24h Range):  Temp:  [97.5 °F (36.4 °C)-98.6 °F (37 °C)] 98.6 °F (37 °C)  Pulse:  [] 101  Resp:  [14-18] 18  SpO2:  [93 %-97 %] 97 %  BP: ()/(66-83) 118/83     Weight: 108.5 kg (239 lb 3.2 oz)  Body mass index is 34.32 kg/m².     SpO2: 97 %  O2 Device (Oxygen Therapy): room air    Physical Exam   Constitutional: He is oriented to person, place, and time. He appears well-developed and well-nourished. No distress.   HENT:   Head: Normocephalic and atraumatic.   Mouth/Throat: Oropharynx is clear and moist.   Eyes: Conjunctivae and EOM are normal. Pupils are equal, round, and reactive to light. No scleral icterus.   Neck: Normal range of motion. Neck supple. JVD: difficult to discern JVD.   Cardiovascular: Regular rhythm and intact distal pulses. Tachycardia present.   Murmur heard.  Pulses:       Radial pulses are 2+ on the right side, and 2+ on the left side.   Pulmonary/Chest: Effort normal and breath sounds normal. No respiratory distress.   Symmetrical expansion, mild rales at the bases   Abdominal: Soft. Bowel sounds are normal. There is no hepatosplenomegaly. There is no tenderness.   Musculoskeletal: Normal range of motion. He exhibits no edema.   Bilateral edema 1-2+, with decreased skin turgor   Neurological: He is alert and oriented to person, place, and time. No cranial  nerve deficit.   Skin: Skin is warm and dry. No rash noted. He is not diaphoretic.   Psychiatric: He has a normal mood and affect. Judgment and thought content normal.       Significant Labs:   EP:   Recent Labs   Lab 10/24/18  0620 10/24/18  0831 10/25/18  0427    136 138   K 3.7 3.9 4.2    100 102   CO2 27 28 26   GLU 89 88 86   BUN 19 18 19   CREATININE 0.9 0.9 1.0   CALCIUM 9.1 9.1 9.1   ANIONGAP 9 8 10   ESTGFRAFRICA >60.0 >60.0 >60.0   EGFRNONAA >60.0 >60.0 >60.0   WBC 4.59 5.76 4.92   HGB 9.7* 9.6* 10.0*   HCT 31.9* 31.1* 33.8*   * 130* 130*         Assessment and Plan:     Atrial flutter    Atrial flutter, rate controlled in the setting of heart failure exacerbation due to medication non-compliance compounded by valvular disease of uncertain severity. Reviewed medication compliance with patient and he stresses the desire to comply with medicines from here on out. Neurology feels that anti-coagulation is appropriate in this patient, which would be appropriate for a DCCV plan.    - continue Xarelto  - plan for NITA/DCCV today (discussed MV assessment with NITA team)  - agree with lopressor/dig for rate control     Biventricular ICD (implantable cardioverter-defibrillator) in place    ICD is a [employer] Iperia 7 HF-T. S/N: [id number] (PID: [id number])         John Paul Isabel MD  Cardiac Electrophysiology  [facility]

## 2018-10-25 NOTE — SUBJECTIVE & OBJECTIVE
Interval History: Resting in recliner, reports ongoing LE swelling. He otherwise has no complaints or needs. He denies chest pain, palpitations, or shortness of breath. Denies any acute events or distress overnight.     Review of Systems   Constitutional: Positive for activity change and fatigue. Negative for appetite change, chills, diaphoresis, fever and unexpected weight change.   HENT: Negative for congestion, sinus pressure, sinus pain, sneezing and sore throat.    Respiratory: Negative for cough, chest tightness and shortness of breath.    Cardiovascular: Positive for leg swelling. Negative for chest pain and palpitations.   Gastrointestinal: Negative for abdominal distention, abdominal pain, constipation, diarrhea, nausea and vomiting.   Genitourinary: Positive for frequency. Negative for decreased urine volume, difficulty urinating and urgency.   Musculoskeletal: Positive for gait problem. Negative for arthralgias, back pain, joint swelling and myalgias.   Skin: Negative for color change, pallor, rash and wound.   Neurological: Negative for dizziness, syncope, weakness, light-headedness and headaches.     Objective:     Vital Signs (Most Recent):  Temp: 98.1 °F (36.7 °C) (10/24/18 1520)  Pulse: 78 (10/24/18 1520)  Resp: 18 (10/24/18 1520)  BP: 108/68 (10/24/18 1520)  SpO2: 95 % (10/24/18 1520) Vital Signs (24h Range):  Temp:  [97.6 °F (36.4 °C)-98.2 °F (36.8 °C)] 98.1 °F (36.7 °C)  Pulse:  [72-94] 78  Resp:  [18] 18  SpO2:  [95 %-98 %] 95 %  BP: ()/(54-70) 108/68     Weight: 107 kg (235 lb 14.3 oz)  Body mass index is 33.85 kg/m².    Intake/Output Summary (Last 24 hours) at 10/23/2018 2023  Last data filed at 10/24/2018 1500  Gross per 24 hour   Intake 780 ml   Output 2950 mL   Net -2170 mL      Physical Exam   Constitutional: He is oriented to person, place, and time. He appears well-developed and well-nourished.   HENT:   Head: Normocephalic and atraumatic.   Mouth/Throat: Mucous membranes are  normal. Normal dentition. No oropharyngeal exudate.   Eyes: Conjunctivae and lids are normal. Pupils are equal, round, and reactive to light. No scleral icterus.   Neck: Normal range of motion. Neck supple. JVD present.   Cardiovascular: Normal rate, regular rhythm and intact distal pulses. Exam reveals no gallop and no friction rub.   Murmur heard.  Pulmonary/Chest: Effort normal and breath sounds normal. He exhibits no tenderness.   Abdominal: Soft. Bowel sounds are normal. He exhibits no distension. There is no tenderness.   Musculoskeletal: Normal range of motion. He exhibits edema (BLE with 2+ pitting edema, mid shin to feet). He exhibits no deformity.   Neurological: He is alert and oriented to person, place, and time. No cranial nerve deficit.   Skin: Skin is warm and dry. Capillary refill takes 2 to 3 seconds. No rash noted. No erythema.   Psychiatric: He has a normal mood and affect. Judgment and thought content normal.   Nursing note and vitals reviewed.    Significant Labs:   CBC:   Lab 10/23/18  0502 10/24/18  0620   WBC 4.20 4.59   HGB 9.8* 9.7*   HCT 30.8* 31.9*   * 142*     CMP:   Lab 10/23/18  0502 10/24/18  0620   * 136   K 3.7 3.7   CL 98 100   CO2 28 27   GLU 89 89   BUN 25* 19   CREATININE 1.0 0.9   CALCIUM 9.2 9.1   ANIONGAP 9 9   EGFRNONAA >60.0 >60.0     Magnesium:   Lab 10/23/18  0502   MG 1.7     All pertinent labs within the past 24 hours have been reviewed.    Significant Imaging: I have reviewed all pertinent imaging results/findings within the past 24 hours.

## 2018-10-25 NOTE — PLAN OF CARE
[facility]    HOME HEALTH ORDERS  FACE TO FACE ENCOUNTER    Patient Name: Quentin Mckeon  YOB: 1955    PCP: Primary Doctor No   PCP Address: None  PCP Phone Number: None  PCP Fax: None    Encounter Date: 10/25/2018    Admit to Home Health    Diagnoses:  Active Hospital Problems    Diagnosis  POA    *Acute on chronic systolic heart failure [I50.23]  Yes     Priority: 1 - High     Chronic    Atrial flutter [I48.92]  Yes     Priority: 2     Ventricular tachycardia, monomorphic [I47.2]  Yes     Priority: 3     Biventricular ICD (implantable cardioverter-defibrillator) in place [Z95.810]  Yes     Priority: 4       ICD is a [employer]ron[employer] Iperia 7 HF-T. S/N: [id number] (PID: [id number])             Mitral valve stenosis [I05.0]  Yes     Priority: 6     Prosthetic mitral valve stenosis bioprosthetic 2014 [T82.857A]  Yes     Priority: 7     Stroke vs seizure [I63.9]  Unknown     Priority: 8     Coronary artery disease involving native coronary artery of native heart without angina pectoris [I25.10]  Yes     Priority: 9     Chronic stable angina [I20.8]  Yes     Priority: 9     Hx of CABG 2014 [Z95.1]  Not Applicable     Priority: 10     Chronic anticoagulation [Z79.01]  Not Applicable     Priority: 11     Benign essential HTN [I10]  Yes     Priority: 12     HLD (hyperlipidemia) [E78.5]  Yes     Priority: 13       Resolved Hospital Problems    Diagnosis Date Resolved POA    Altered mental status [R41.82] 10/20/2018 Yes       Future Appointments   Date Time Provider Department Center   10/25/2018  4:00 PM INPATIENT, [facility] Mann Gillis     I have seen and examined this patient face to face today. My clinical findings that support the need for the home health skilled services and home bound status are the following:  Patient with medication mismanagement issues requiring home bound status as evidenced by  Poor understanding of medication regimen/dosage and Poor adherence to medication  regimen/dosage.    Allergies:Review of patient's allergies indicates:  No Known Allergies    Diet: cardiac diet, fluid restriction: 1800 mL/day and 2 gram sodium diet    Activities: activity as tolerated    Nursing:   SN to complete comprehensive assessment including routine vital signs. Instruct on disease process and s/s of complications to report to MD. Review/verify medication list sent home with the patient at time of discharge  and instruct patient/caregiver as needed. Frequency may be adjusted depending on start of care date.    Notify PCP if SBP > 160 or < 90; DBP > 90 or < 50; HR > 120 or < 50; Temp > 101    CONSULTS:    Physical Therapy to evaluate and treat. Evaluate for home safety and equipment needs; Establish/upgrade home exercise program. Perform / instruct on therapeutic exercises, gait training, transfer training, and Range of Motion.   to evaluate for community resources/long-range planning.    MISCELLANEOUS CARE:  Heart Failure:      SN to instruct on the following:    Instruct on the definition of CHF.   Instruct on the signs/sympoms of CHF to be reported.   Instruct on and monitor daily weights.   Instruct on factors that cause exacerbation.   Instruct on action, dose, schedule, and side effects of medications.   Instruct on diet as prescribed.   Instruct on activity allowed.   Instruct on life-style modifications for life long management of CHF   SN to assess compliance with daily weights, diet, medications, fluid retention,    safety precautions, activities permitted and life-style modifications.   Additional 1-2 SN visits per week as needed for signs and symptoms     of CHF exacerbation.      For Weight Gain > 2-3 lbs in 1 day or 4-6 lbs over 1 week that is not responsive to PRN metolazone:  Increase furosemide (oral diuretic) dose to 80 mg for 3 days temporarily  Obtain BMP lab test in 3 days  If weight does not decrease by 3-5 lbs after 5 days of increased diuretic usage:  Notify PCP.    WOUND CARE ORDERS  n/a    Medications: Review discharge medications with patient and family and provide education.      Current Discharge Medication List      START taking these medications    Details   digoxin (LANOXIN) 125 mcg tablet Take 1 tablet (0.125 mg total) by mouth once daily.  Qty: 30 tablet, Refills: 11      losartan (COZAAR) 25 MG tablet Take ½ tablet (12.5 mg total) by mouth once daily.  Qty: 45 tablet, Refills: 3      nicotine (NICODERM CQ) 21 mg/24 hr Place 1 patch onto the skin once daily.  Refills: 0         CONTINUE these medications which have CHANGED    Details   furosemide (LASIX) 80 MG tablet Take 1 tablet (80 mg total) by mouth 2 (two) times daily.  Qty: 60 tablet, Refills: 11      metoprolol succinate (TOPROL-XL) 25 MG 24 hr tablet Take 3 tablets (75 mg total) by mouth once daily.  Qty: 180 tablet, Refills: 3         CONTINUE these medications which have NOT CHANGED    Details   albuterol (PROAIR HFA) 90 mcg/actuation inhaler Inhale 2 puffs into the lungs every 6 (six) hours as needed for Wheezing. Rescue      atorvastatin (LIPITOR) 40 MG tablet Take 40 mg by mouth once daily.      budesonide-formoterol 160-4.5 mcg (SYMBICORT) 160-4.5 mcg/actuation HFAA Inhale 2 puffs into the lungs every 12 (twelve) hours. Controller      magnesium oxide (MAG-OX) 400 mg (241.3 mg magnesium) tablet Take 400 mg by mouth once daily.      metOLazone (ZAROXOLYN) 5 MG tablet Take 5 mg by mouth as needed. Twice weekly as needed      potassium chloride (KLOR-CON) 10 MEQ TbSR Take 20 mEq by mouth 3 (three) times daily. 2  Caps tid      ranolazine (RANEXA) 500 MG Tb12 Take 500 mg by mouth 2 (two) times daily.      rivaroxaban (XARELTO) 20 mg Tab Take 1 tablet (20 mg total) by mouth once daily. Do not take this medication for 5 days after procedure (restart on 2/18/2018)      acetaminophen (TYLENOL) 325 MG tablet Take 2 tablets (650 mg total) by mouth every 4 (four) hours as needed.  Refills: 0       amiodarone (PACERONE) 200 MG Tab Take 1 tablet (200 mg total) by mouth 2 (two) times daily. Take 200mg twice a day until seen by Dr. Fitzgerald in clinic  Qty: 60 tablet, Refills: 3      nitroGLYCERIN (NITROSTAT) 0.4 MG SL tablet Place 0.4 mg under the tongue every 5 (five) minutes as needed for Chest pain.         STOP taking these medications       hydrALAZINE (APRESOLINE) 25 MG tablet Comments:   Reason for Stopping:         isosorbide mononitrate (IMDUR) 30 MG 24 hr tablet Comments:   Reason for Stopping:         sacubitril-valsartan (ENTRESTO) 49-51 mg per tablet Comments:   Reason for Stopping:             I certify that this patient is confined to his home and needs intermittent skilled nursing care and physical therapy.      Dai Curry, DNP, AG-ACNP, BC  Department of Hospital Medicine  [facility]

## 2018-10-25 NOTE — ASSESSMENT & PLAN NOTE
-Agree with PO Lasix  - Watch electrolytes and aggresively replace   -  Watch BP  - Recommend switch to Metop Succinate 75mg qday now rate controlled for HF and continue ARB  - Can stop digoxin post DCCV, due to compliance issues and toxicity complications, we were using primarily for rate control of flutter

## 2018-10-25 NOTE — ASSESSMENT & PLAN NOTE
-transferred 10/19 due to CHF exacerbation in setting of potential MV restenosis  -at admission  CBC and chemistry stable, troponin 0.203 within historical baseline, , TSH 0.550, and HgA1c 4.9/94  -2D echo completed on arrival with EF 20-25% (decline from 30-35% previously), concentric remodeling, KRYSTAL, mildly depressed RV systolic function, moderate TR, pulmHTN with PASP 52, RAP 15, and MV with mean gradient obtained across the mitral valve is 10 mmHg, effective prosthetic valve area is 3.2 cm2, and effective prosthetic valve area corrected for BSA is 1.43 cm2  -evaluated by CTS on arrival with recommendations to optimize from a heart failure standpoint with arrhythmia control as he is a poor candidate for redo surgery   -will repeat 2D echo once rate control or rhythm restoration occurs to establish true gradient  -due to atrial arrhythmia (eldaley contributing to CHF exacerbation) and known fractured ICD lead EP was consulted   -recommended continuing OAC as surgical procedures are not indicated and continuing rate control with lopressor and dig load   -pending NITA/DCCV in AM  -Cardiology co-managed consulted and assisting with CHF management   -will continue digoxin, lopressor, and losartan   -diuresing with IVP lasix, currently net negative 2.4 liters and weight decreasing >> monitor for ability to transition to PO   -hospital course complicated due to hypotension, AMS, and chest pain; home medications resumed with resultant hypotensive episode (likely due to known medication noncompliance) and associated episode of aphasia and Left sided weakness, stroke code was called   -head CT negative   -Neurology was also concerned for seizure; EEG negative for seizure activity   -has an MRI compatible device however due to safety concerns with unattached ICD lead scan not performed   -given IVF bolus over several hours with minimal improvement in BPs and urine output (now resolved), nor was hydration complicated by  pulmonary edema; does not recall the events and has had no further neurological events since and remains alert and oriented  -also had two episodes of severe chest pain, rate 10/10 that was reproducible on exam (likely due to chest trauma related to fall PTA), repeat EKG with v paced rhythm and troponin trend flat. Pain was managed with IV morphine and has since resolved  -continue tele monitoring  -cardiac diet with fluid restriction  -strict I&Os and daily weights  -will assess candidacy for home health to assist in CHF monitoring and medication compliance  -will need outpt follow up with CTS, EP, and primary Cardiologist

## 2018-10-25 NOTE — PHYSICIAN QUERY
PT Name: Quentin Mckeon  MR #: [id number]    Physician Query Form - Atrial Fibrillation Specificity     CDS/: Darline Keita RN, CCDS             Contact information: [email or web address]     This form is a permanent document in the medical record.     Query Date: October 25, 2018    By submitting this query, we are merely seeking further clarification of documentation. Please utilize your independent clinical judgment when addressing the question(s) below.    The medical record contains the following:   Indicators     Supporting Clinical Findings Location in Medical Record   X Atrial Fibrillation  afib burden reported but not in afib     Being treated for afib and dvt     No longer a xarelto candidate as he has valvular afib.  10/19 h/p    10/19, 10/23  neuro vasc    10/20 prog note   X EKG results Ventricular-paced rhythm 10/19- 10/23 ekg's   X Medication Takes xarelto at home - last dose 10/18     - No longer a xarelto candidate as he has valvular afib.  Heparin on board ossible upcoming surgical procedures  - will need bridge to warfarin and set up with coumadin clinic in [place] 10/19     10/20 prog note    Treatment      Other         Provider, please further specify the Atrial Fibrillation diagnosis.    [ X ] Chronic  [  ] Permanent  [  ] Other (please specify): ____________________________  [  ] Clinically Undetermined    Please document in your progress notes daily for the duration of treatment until resolved, and include in your discharge summary.

## 2018-10-25 NOTE — ANESTHESIA POSTPROCEDURE EVALUATION
"Anesthesia Post Evaluation    Patient: Quentin Mckeon    Procedure(s) Performed: Procedure(s) (LRB):  CARDIOVERSION (N/A)    Final Anesthesia Type: general  Patient location during evaluation: PACU  Patient participation: Yes- Able to Participate  Level of consciousness: awake and alert  Post-procedure vital signs: reviewed and stable  Pain management: adequate  Airway patency: patent  PONV status at discharge: No PONV  Anesthetic complications: no      Cardiovascular status: blood pressure returned to baseline and hemodynamically stable  Respiratory status: unassisted, spontaneous ventilation and room air  Hydration status: euvolemic  Follow-up not needed.        Visit Vitals  /67   Pulse 92   Temp 36.8 °C (98.2 °F) (Oral)   Resp 12   Ht 5' 10" (1.778 m)   Wt 108.5 kg (239 lb 3.2 oz)   SpO2 97%   BMI 34.32 kg/m²       Pain/Gold Score: Pain Assessment Performed: Yes (10/25/2018  5:15 PM)  Presence of Pain: denies (10/25/2018  5:15 PM)  Pain Rating Prior to Med Admin: 0 (10/25/2018  2:00 PM)  Gold Score: 10 (10/25/2018  5:15 PM)        "

## 2018-10-25 NOTE — SUBJECTIVE & OBJECTIVE
Interval History: Resting in recliner, is awaiting DCCV and is frustrated with extended NPO status. He endorses BLE edema has improved however remains greater than baseline. He denies chest pain, palpitations, or shortness of breath. Denies any acute events or distress overnight.     Review of Systems   Constitutional: Positive for activity change and fatigue. Negative for appetite change, chills, diaphoresis, fever and unexpected weight change.   HENT: Negative for congestion, sinus pressure, sinus pain, sneezing and sore throat.    Respiratory: Negative for cough, chest tightness and shortness of breath.    Cardiovascular: Positive for leg swelling. Negative for chest pain and palpitations.   Gastrointestinal: Negative for abdominal distention, abdominal pain, constipation, diarrhea, nausea and vomiting.   Genitourinary: Positive for frequency. Negative for decreased urine volume, difficulty urinating and urgency.   Musculoskeletal: Positive for gait problem. Negative for arthralgias, back pain, joint swelling and myalgias.   Skin: Negative for color change, pallor, rash and wound.   Neurological: Negative for dizziness, syncope, weakness, light-headedness and headaches.     Objective:     Vital Signs (Most Recent):  Temp: 98.1 °F (36.7 °C) (10/24/18 1520)  Pulse: 87 (10/24/18 2031)  Resp: 16 (10/24/18 2031)  BP: 108/68 (10/24/18 1520)  SpO2: (!) 93 % (10/24/18 2031) Vital Signs (24h Range):  Temp:  [97.6 °F (36.4 °C)-98.2 °F (36.8 °C)] 98.1 °F (36.7 °C)  Pulse:  [72-94] 87  Resp:  [16-18] 16  SpO2:  [93 %-98 %] 93 %  BP: ()/(54-70) 108/68     Weight: 107 kg (235 lb 14.3 oz)  Body mass index is 33.85 kg/m².    Intake/Output Summary (Last 24 hours) at 10/24/2018 2046  Last data filed at 10/24/2018 1500  Gross per 24 hour   Intake 180 ml   Output 2775 ml   Net -2595 ml      Physical Exam   Constitutional: He is oriented to person, place, and time. He appears well-developed and well-nourished.   HENT:   Head:  Normocephalic and atraumatic.   Mouth/Throat: Mucous membranes are normal. Normal dentition. No oropharyngeal exudate.   Eyes: Conjunctivae and lids are normal. Pupils are equal, round, and reactive to light. No scleral icterus.   Neck: Normal range of motion. Neck supple. JVD present.   Cardiovascular: Normal rate, regular rhythm and intact distal pulses. Exam reveals no gallop and no friction rub.   Murmur heard.  Pulmonary/Chest: Effort normal and breath sounds normal. He exhibits no tenderness.   Abdominal: Soft. Bowel sounds are normal. He exhibits no distension. There is no tenderness.   Musculoskeletal: Normal range of motion. He exhibits edema (BLE with 1+ pitting edema, ankles/feet). He exhibits no deformity.   Neurological: He is alert and oriented to person, place, and time. No cranial nerve deficit.   Skin: Skin is warm and dry. Capillary refill takes 2 to 3 seconds. No rash noted. No erythema.   Psychiatric: He has a normal mood and affect. Judgment and thought content normal.   Nursing note and vitals reviewed.    Significant Labs:   CBC:   Recent Labs   Lab 10/23/18  0502 10/24/18  0620 10/24/18  0831   WBC 4.20 4.59 5.76   HGB 9.8* 9.7* 9.6*   HCT 30.8* 31.9* 31.1*   * 142* 130*     CMP:   Recent Labs   Lab 10/23/18  0502 10/24/18  0620 10/24/18  0831   * 136 136   K 3.7 3.7 3.9   CL 98 100 100   CO2 28 27 28   GLU 89 89 88   BUN 25* 19 18   CREATININE 1.0 0.9 0.9   CALCIUM 9.2 9.1 9.1   ANIONGAP 9 9 8   EGFRNONAA >60.0 >60.0 >60.0     Magnesium:   Recent Labs   Lab 10/23/18  0502 10/24/18  0620   MG 1.7 1.8     All pertinent labs within the past 24 hours have been reviewed.    Significant Imaging: I have reviewed all pertinent imaging results/findings within the past 24 hours.

## 2018-10-25 NOTE — TRANSFER OF CARE
"Anesthesia Transfer of Care Note    Patient: Quentin Mckeon    Procedure(s) Performed: Procedure(s) (LRB):  CARDIOVERSION (N/A)    Patient location: PACU    Anesthesia Type: general    Transport from OR: Transported from OR on 2-3 L/min O2 by NC with adequate spontaneous ventilation    Post pain: adequate analgesia    Post assessment: no apparent anesthetic complications    Post vital signs: stable    Level of consciousness: awake and alert    Nausea/Vomiting: no nausea/vomiting    Complications: none    Transfer of care protocol was followed      Last vitals:   Visit Vitals  /85 (BP Location: Right arm, Patient Position: Lying)   Pulse 88   Temp 36.8 °C (98.2 °F) (Oral)   Resp 16   Ht 5' 10" (1.778 m)   Wt 108.5 kg (239 lb 3.2 oz)   SpO2 100%   BMI 34.32 kg/m²     "

## 2018-10-25 NOTE — PROGRESS NOTES
[facility] Hospital Medicine  Progress Note    Patient Name: Quentin Mckeon  MRN: [id number]  Patient Class: IP- Inpatient   Admission Date: 10/19/2018  Length of Stay: 5 days  Attending Physician: Ebony Ram MD  Primary Care Provider: [facility] Medicine Team: [facility] HOSP MED JAYLEEN Curry NP    Subjective:     Principal Problem:Acute on chronic systolic heart failure    HPI:  Mr. Mckeon is a 63 year old male with past medical history of HFrEF (EF 35%), VF/VT s/p BiV AICD (extraction and reimplantation 2/2018 with some lead retention related to lead position under clavicle), CAD s/p CABG 2014, MV bioprosthetic repair in 2014 now with severe restenosis, AFib on Xarelto, tobacco use, and ~ daily ETOH use with most recent who presented to [facility] with SOB and chest pain x 3 days.  He was diagnosed with ADHF thought secondary to mitral bioprosthetic valve restenosis. Prior to admission at  he had a fall due to ETOH intoxication that resulted in chest trauma with subsequent small hematoma; device was interrogated finding normal impedance, During interrogation noted tachy episode that within VF zone 10/19 that was ATP'd with resolution and was in 2:1 Aflutter with rates ~100; OAC with xarelto was continued. He also had a recent prior admission at OSH for cough, body aches, productive cough, with negative flu swab. Due to multiple recent CHF admission thought secondary to mitral valve restenosis he was transferred to [facility] for evaluation by EP and CTS. He reports occasional dietary non compliance and has been out of home medications for ~a month and amiodarone for ~ 5 months. All past medical, social, and family history reviewed.     At admission CBC and chemistry stable, troponin 0.203 within historical baseline, , TSH 0.550, and HgA1c 4.9/94. The patient was admitted to the Hospital Medicine Service for further evaluation and management.     Hospital  Course:  Mr. Mckeon was transferred 10/19 due to CHF exacerbation in setting of potential MV restenosis. 2D echo completed on arrival with EF 20-25% (decline from 30-35% previously), concentric remodeling, KRYSTAL, mildly depressed RV systolic function, moderate TR, pulmHTN with PASP 52, RAP 15, and MV with mean gradient obtained across the mitral valve is 10 mmHg, effective prosthetic valve area is 3.2 cm2, and effective prosthetic valve area corrected for BSA is 1.43 cm2. He was evaluated by CTS on arrival with recommendations to optimize from a heart failure standpoint with arrhythmia control as he is a poor candidate for redo surgery. Will repeat 2D echo once rate control or rhythm restoration occurs to establish true gradient. Due to atrial arrhythmia and known fractured ICD lead EP was consulted, recommended continuing OAC as surgical procedures are not indicated and continuing rate control with lopressor and dig load, pending NITA/DCCV in AM. Cardiology co-managed consulted and assisting with CHF management, will continue digoxin, lopressor, and losartan. Diuresing with IVP lasix, currently net negative 2.4 liters and weight decreasing.     After admission course complicated due to hypotension, AMS, and chest pain. Home medications resumed with resultant  hypotensive episode (likely due to known medication noncompliance) and associated episode of aphasia and Left sided weakness, stroke code was called; head CT negative. Neurology was also concerned for seizure; EEG negative for seizure activity. He has an MRI compatible device however due to safety concerns with unattached ICD lead scan not performed. He was given IVF bolus over several hours with minimal improvement in BPs and urine output (now resolved), nor was hydration complicated by pulmonary edema. He does not recall the events and has had no further neurological events since and remains alert and oriented. He also had two episodes of severe chest pain,  rate 10/10 that was reproducible on exam (likely related to chest trauma in setting of fall PTA), repeat EKG with v paced rhythm and troponin trend flat. Pain was managed with IV morphine and has since resolved.     Disposition plans: plan to DC home when medically ready, will assess candidacy for home health to assist in CHF monitoring and medication compliance. He will need outpt follow up with CTS, EP, and primary Cardiologist.     Interval History: Resting in recliner, is awaiting DCCV and is frustrated with extended NPO status. He endorses BLE edema has improved however remains greater than baseline. He denies chest pain, palpitations, or shortness of breath. Denies any acute events or distress overnight.     Review of Systems   Constitutional: Positive for activity change and fatigue. Negative for appetite change, chills, diaphoresis, fever and unexpected weight change.   HENT: Negative for congestion, sinus pressure, sinus pain, sneezing and sore throat.    Respiratory: Negative for cough, chest tightness and shortness of breath.    Cardiovascular: Positive for leg swelling. Negative for chest pain and palpitations.   Gastrointestinal: Negative for abdominal distention, abdominal pain, constipation, diarrhea, nausea and vomiting.   Genitourinary: Positive for frequency. Negative for decreased urine volume, difficulty urinating and urgency.   Musculoskeletal: Positive for gait problem. Negative for arthralgias, back pain, joint swelling and myalgias.   Skin: Negative for color change, pallor, rash and wound.   Neurological: Negative for dizziness, syncope, weakness, light-headedness and headaches.     Objective:     Vital Signs (Most Recent):  Temp: 98.1 °F (36.7 °C) (10/24/18 1520)  Pulse: 87 (10/24/18 2031)  Resp: 16 (10/24/18 2031)  BP: 108/68 (10/24/18 1520)  SpO2: (!) 93 % (10/24/18 2031) Vital Signs (24h Range):  Temp:  [97.6 °F (36.4 °C)-98.2 °F (36.8 °C)] 98.1 °F (36.7 °C)  Pulse:  [72-94] 87  Resp:   [16-18] 16  SpO2:  [93 %-98 %] 93 %  BP: ()/(54-70) 108/68     Weight: 107 kg (235 lb 14.3 oz)  Body mass index is 33.85 kg/m².    Intake/Output Summary (Last 24 hours) at 10/24/2018 2046  Last data filed at 10/24/2018 1500  Gross per 24 hour   Intake 180 ml   Output 2775 ml   Net -2595 ml      Physical Exam   Constitutional: He is oriented to person, place, and time. He appears well-developed and well-nourished.   HENT:   Head: Normocephalic and atraumatic.   Mouth/Throat: Mucous membranes are normal. Normal dentition. No oropharyngeal exudate.   Eyes: Conjunctivae and lids are normal. Pupils are equal, round, and reactive to light. No scleral icterus.   Neck: Normal range of motion. Neck supple. JVD present.   Cardiovascular: Normal rate, regular rhythm and intact distal pulses. Exam reveals no gallop and no friction rub.   Murmur heard.  Pulmonary/Chest: Effort normal and breath sounds normal. He exhibits no tenderness.   Abdominal: Soft. Bowel sounds are normal. He exhibits no distension. There is no tenderness.   Musculoskeletal: Normal range of motion. He exhibits edema (BLE with 1+ pitting edema, ankles/feet). He exhibits no deformity.   Neurological: He is alert and oriented to person, place, and time. No cranial nerve deficit.   Skin: Skin is warm and dry. Capillary refill takes 2 to 3 seconds. No rash noted. No erythema.   Psychiatric: He has a normal mood and affect. Judgment and thought content normal.   Nursing note and vitals reviewed.    Significant Labs:   CBC:   Recent Labs   Lab 10/23/18  0502 10/24/18  0620 10/24/18  0831   WBC 4.20 4.59 5.76   HGB 9.8* 9.7* 9.6*   HCT 30.8* 31.9* 31.1*   * 142* 130*     CMP:   Recent Labs   Lab 10/23/18  0502 10/24/18  0620 10/24/18  0831   * 136 136   K 3.7 3.7 3.9   CL 98 100 100   CO2 28 27 28   GLU 89 89 88   BUN 25* 19 18   CREATININE 1.0 0.9 0.9   CALCIUM 9.2 9.1 9.1   ANIONGAP 9 9 8   EGFRNONAA >60.0 >60.0 >60.0     Magnesium:   Recent  Labs   Lab 10/23/18  0502 10/24/18  0620   MG 1.7 1.8     All pertinent labs within the past 24 hours have been reviewed.    Significant Imaging: I have reviewed all pertinent imaging results/findings within the past 24 hours.    Assessment/Plan:      * Acute on chronic systolic heart failure    -transferred 10/19 due to CHF exacerbation in setting of potential MV restenosis  -at admission  CBC and chemistry stable, troponin 0.203 within historical baseline, , TSH 0.550, and HgA1c 4.9/94  -2D echo completed on arrival with EF 20-25% (decline from 30-35% previously), concentric remodeling, KRYSTAL, mildly depressed RV systolic function, moderate TR, pulmHTN with PASP 52, RAP 15, and MV with mean gradient obtained across the mitral valve is 10 mmHg, effective prosthetic valve area is 3.2 cm2, and effective prosthetic valve area corrected for BSA is 1.43 cm2  -evaluated by CTS on arrival with recommendations to optimize from a heart failure standpoint with arrhythmia control as he is a poor candidate for redo surgery   -will repeat 2D echo once rate control or rhythm restoration occurs to establish true gradient  -due to atrial arrhythmia (likley contributing to CHF exacerbation) and known fractured ICD lead EP was consulted   -recommended continuing OAC as surgical procedures are not indicated and continuing rate control with lopressor and dig load   -pending NITA/DCCV in AM  -Cardiology co-managed consulted and assisting with CHF management   -will continue digoxin, lopressor, and losartan   -diuresing with IVP lasix, currently net negative 2.4 liters and weight decreasing >> monitor for ability to transition to PO   -hospital course complicated due to hypotension, AMS, and chest pain; home medications resumed with resultant hypotensive episode (likely due to known medication noncompliance) and associated episode of aphasia and Left sided weakness, stroke code was called   -head CT negative   -Neurology was also  concerned for seizure; EEG negative for seizure activity   -has an MRI compatible device however due to safety concerns with unattached ICD lead scan not performed   -given IVF bolus over several hours with minimal improvement in BPs and urine output (now resolved), nor was hydration complicated by pulmonary edema; does not recall the events and has had no further neurological events since and remains alert and oriented  -also had two episodes of severe chest pain, rate 10/10 that was reproducible on exam (likely due to chest trauma related to fall PTA), repeat EKG with v paced rhythm and troponin trend flat. Pain was managed with IV morphine and has since resolved  -continue tele monitoring  -cardiac diet with fluid restriction  -strict I&Os and daily weights  -will assess candidacy for home health to assist in CHF monitoring and medication compliance  -will need outpt follow up with CTS, EP, and primary Cardiologist     Atrial flutter    -see above for detailed plans  -continue lopressor, dig, and xarelto  -continue tele monitoring     Ventricular tachycardia, monomorphic    -see HPI  -no tele events during hospital course  -continue tele monitoring      Biventricular ICD (implantable cardioverter-defibrillator) in place    -see above     Mitral valve stenosis    -see above for detailed plans      Prosthetic mitral valve stenosis bioprosthetic 2014    -see above      Stroke vs seizure    -see above for detailed plans  -remains neuro intact       Coronary artery disease involving native coronary artery of native heart without angina pectoris    -troponins within historical baseline, no acute ischemic EKG changes  -continue ASA, statin, lopressor, losartan, and ranolazine      Chronic stable angina    -denies angina  -continue home medications as above      Hx of CABG 2014    -see above      Chronic anticoagulation    -continue OAC with xarelto  -see above for detailed arrhythmia plans      Benign essential HTN     -BP stable   -hypotensive episode when home medications resumed, see above  -continue lopressor and losartan  -continue holding hydralazine, ISMN, and entresto        HLD (hyperlipidemia)    -continue statin  -lipid panel with cholesterol 98, HDL 25, LDL 60, and triglycerides 64  -cardiac diet in house       VTE Risk Mitigation (From admission, onward)        Ordered     rivaroxaban tablet 20 mg  with dinner      10/23/18 1601     heparin 25,000 units in dextrose 5% 250 mL (100 units/mL) infusion LOW INTENSITY nomogram - [facility]  Continuous      10/19/18 1925     heparin 25,000 units in dextrose 5% (100 units/ml) IV bolus from bag - ADDITIONAL PRN BOLUS - 60 units/kg  As needed (PRN)      10/19/18 1925     heparin 25,000 units in dextrose 5% (100 units/ml) IV bolus from bag - ADDITIONAL PRN BOLUS - 30 units/kg  As needed (PRN)      10/19/18 1925          Dai Curry, DNP, AG-ACNP, BC  Department of Hospital Medicine  [facility]  Pager [phone]  [facility] [id number]

## 2018-10-25 NOTE — NURSING TRANSFER
Nursing Transfer Note      10/25/2018     Transfer To: ECHO/Stress Lab    Transfer via stretcher    Transfer with cardiac monitoring    Transported by Kylee Enriquez    Medicines sent: None    Chart send with patient: No    Patient notified Ryan Escoto by his report.

## 2018-10-25 NOTE — NURSING TRANSFER
Nursing Transfer Note      10/25/2018     Transfer To: 311 From [facility] 27    Transfer via stretcher    Transfer with cardiac monitoring    Transported by escort    Medicines sent: none    Chart send with patient: Yes    Notified: Kiersten DOHERTY    Patient reassessed at: 1800 10/25/18    Upon arrival to floor: cardiac monitor applied, patient oriented to room, call bell in reach and bed in lowest position    VSS.  No complaints of pain reported. Pt rested and watched tv while in [facility]

## 2018-10-25 NOTE — ASSESSMENT & PLAN NOTE
-transferred 10/19 due to CHF exacerbation in setting of potential MV restenosis  -at admission  CBC and chemistry stable, troponin 0.203 within historical baseline, , TSH 0.550, and HgA1c 4.9/94  -2D echo completed on arrival with EF 20-25% (decline from 30-35% previously), concentric remodeling, KRYSTAL, mildly depressed RV systolic function, moderate TR, pulmHTN with PASP 52, RAP 15, and MV with mean gradient obtained across the mitral valve is 10 mmHg, effective prosthetic valve area is 3.2 cm2, and effective prosthetic valve area corrected for BSA is 1.43 cm2  -evaluated by CTS on arrival with recommendations to optimize from a heart failure standpoint with arrhythmia control as he is a poor candidate for redo surgery   -will repeat 2D echo once rate control or rhythm restoration occurs to establish true gradient  -due to atrial arrhythmia (likley contributing to CHF exacerbation) and known fractured ICD lead EP was consulted   -recommended continuing OAC as surgical procedures are not indicated and continuing rate control with lopressor and dig load   -pending NITA/DCCV today  -Cardiology co-managed consulted and assisting with CHF management   -will continue digoxin, lopressor, and losartan   -diuresing with IVP lasix, currently net negative 4.5 liters and weight decreased with improved physical exam >> transitioned to PO maintenance dose   -hospital course complicated due to hypotension, AMS, and chest pain; home medications resumed with resultant hypotensive episode (likely due to known medication noncompliance) and associated episode of aphasia and Left sided weakness, stroke code was called   -head CT negative   -Neurology was also concerned for seizure; EEG negative for seizure activity   -has an MRI compatible device however due to safety concerns with unattached ICD lead scan not performed   -given IVF bolus over several hours with minimal improvement in BPs and urine output (now resolved), nor was  hydration complicated by pulmonary edema; does not recall the events and has had no further neurological events since and remains alert and oriented  -also had two episodes of severe chest pain, rate 10/10 that was reproducible on exam (likely due to chest trauma related to fall PTA), repeat EKG with v paced rhythm and troponin trend flat. Pain was managed with IV morphine and has since resolved  -continue tele monitoring  -cardiac diet with fluid restriction  -strict I&Os and daily weights  -will assess candidacy for home health to assist in CHF monitoring and medication compliance  -will need outpt follow up with CTS, EP, and primary Cardiologist

## 2018-10-25 NOTE — NURSING
Called Echo Lab, [phone], to inquire about NITA/Cardioversion.  Procedure is scheduled for 1500 or 1600 hours.  Patient notified.

## 2018-10-25 NOTE — SUBJECTIVE & OBJECTIVE
Interval History: Resting in bed, is awaiting DCCV and is frustrated with extended NPO status. He endorses BLE edema has improved and is at baseline. He denies chest pain, palpitations, or shortness of breath. Denies any acute events or distress overnight.     Review of Systems   Constitutional: Positive for activity change and fatigue. Negative for appetite change, chills, diaphoresis, fever and unexpected weight change.   HENT: Negative for congestion, sinus pressure, sinus pain, sneezing and sore throat.    Respiratory: Negative for cough, chest tightness and shortness of breath.    Cardiovascular: Positive for leg swelling (at baseline). Negative for chest pain and palpitations.   Gastrointestinal: Negative for abdominal distention, abdominal pain, constipation, diarrhea, nausea and vomiting.   Genitourinary: Negative for decreased urine volume, difficulty urinating, frequency and urgency.   Musculoskeletal: Positive for gait problem. Negative for arthralgias, back pain, joint swelling and myalgias.   Skin: Negative for color change, pallor, rash and wound.   Neurological: Negative for dizziness, syncope, weakness, light-headedness and headaches.     Objective:     Vital Signs (Most Recent):  Temp: 98.2 °F (36.8 °C) (10/25/18 1457)  Pulse: 88 (10/25/18 1457)  Resp: 16 (10/25/18 1457)  BP: 118/85 (10/25/18 1457)  SpO2: 100 % (10/25/18 1457) Vital Signs (24h Range):  Temp:  [97.5 °F (36.4 °C)-98.6 °F (37 °C)] 98.2 °F (36.8 °C)  Pulse:  [] 88  Resp:  [14-18] 16  SpO2:  [93 %-100 %] 100 %  BP: (104-118)/(68-85) 118/85     Weight: 108.5 kg (239 lb 3.2 oz)  Body mass index is 34.32 kg/m².    Intake/Output Summary (Last 24 hours) at 10/25/2018 1504  Last data filed at 10/25/2018 1459  Gross per 24 hour   Intake 240 ml   Output 1001 ml   Net -761 ml      Physical Exam   Constitutional: He is oriented to person, place, and time. He appears well-developed and well-nourished.   HENT:   Head: Normocephalic and  atraumatic.   Mouth/Throat: Mucous membranes are normal. Normal dentition. No oropharyngeal exudate.   Eyes: Conjunctivae and lids are normal. Pupils are equal, round, and reactive to light. No scleral icterus.   Neck: Normal range of motion. Neck supple. No JVD present.   Cardiovascular: Normal rate, regular rhythm and intact distal pulses. Exam reveals no gallop and no friction rub.   Murmur heard.  Pulmonary/Chest: Effort normal and breath sounds normal. He exhibits no tenderness.   Abdominal: Soft. Bowel sounds are normal. He exhibits no distension. There is no tenderness.   Musculoskeletal: Normal range of motion. He exhibits edema (BLE non pitting, ankles/feet). He exhibits no deformity.   Neurological: He is alert and oriented to person, place, and time. No cranial nerve deficit.   Skin: Skin is warm and dry. Capillary refill takes 2 to 3 seconds. No rash noted. No erythema.   Psychiatric: He has a normal mood and affect. Judgment and thought content normal.   Nursing note and vitals reviewed.    Significant Labs:   CBC:   Recent Labs   Lab 10/24/18  0620 10/24/18  0831 10/25/18  0427   WBC 4.59 5.76 4.92   HGB 9.7* 9.6* 10.0*   HCT 31.9* 31.1* 33.8*   * 130* 130*     CMP:   Recent Labs   Lab 10/24/18  0620 10/24/18  0831 10/25/18  0427    136 138   K 3.7 3.9 4.2    100 102   CO2 27 28 26   GLU 89 88 86   BUN 19 18 19   CREATININE 0.9 0.9 1.0   CALCIUM 9.1 9.1 9.1   ANIONGAP 9 8 10   EGFRNONAA >60.0 >60.0 >60.0     Magnesium:   Recent Labs   Lab 10/24/18  0620 10/25/18  0427   MG 1.8 1.8     All pertinent labs within the past 24 hours have been reviewed.    Significant Imaging: I have reviewed all pertinent imaging results/findings within the past 24 hours.

## 2018-10-26 VITALS
BODY MASS INDEX: 34.24 KG/M2 | SYSTOLIC BLOOD PRESSURE: 119 MMHG | DIASTOLIC BLOOD PRESSURE: 67 MMHG | TEMPERATURE: 98 F | HEART RATE: 89 BPM | HEIGHT: 70 IN | WEIGHT: 239.19 LBS | RESPIRATION RATE: 16 BRPM | OXYGEN SATURATION: 96 %

## 2018-10-26 PROBLEM — D59.6: Status: ACTIVE | Noted: 2018-10-26

## 2018-10-26 LAB
ANION GAP SERPL CALC-SCNC: 9 MMOL/L
AORTIC VALVE REGURGITATION: ABNORMAL
BUN SERPL-MCNC: 22 MG/DL
CALCIUM SERPL-MCNC: 9.6 MG/DL
CHLORIDE SERPL-SCNC: 100 MMOL/L
CO2 SERPL-SCNC: 28 MMOL/L
CREAT SERPL-MCNC: 1.3 MG/DL
EST. GFR  (AFRICAN AMERICAN): >60 ML/MIN/1.73 M^2
EST. GFR  (NON AFRICAN AMERICAN): 58.1 ML/MIN/1.73 M^2
ESTIMATED PA SYSTOLIC PRESSURE: 51.24
GLUCOSE SERPL-MCNC: 95 MG/DL
MAGNESIUM SERPL-MCNC: 1.8 MG/DL
MITRAL VALVE MOBILITY: ABNORMAL
POTASSIUM SERPL-SCNC: 4.1 MMOL/L
RETIRED EF AND QEF - SEE NOTES: 25 (ref 55–65)
SODIUM SERPL-SCNC: 137 MMOL/L
TRICUSPID VALVE REGURGITATION: ABNORMAL
TRICUSPID VALVE REGURGITATION: ABNORMAL

## 2018-10-26 PROCEDURE — 25000003 PHARM REV CODE 250: Performed by: NURSE PRACTITIONER

## 2018-10-26 PROCEDURE — 94640 AIRWAY INHALATION TREATMENT: CPT

## 2018-10-26 PROCEDURE — 97530 THERAPEUTIC ACTIVITIES: CPT

## 2018-10-26 PROCEDURE — 99233 SBSQ HOSP IP/OBS HIGH 50: CPT | Mod: GC,,, | Performed by: INTERNAL MEDICINE

## 2018-10-26 PROCEDURE — 99233 SBSQ HOSP IP/OBS HIGH 50: CPT | Mod: ,,, | Performed by: PSYCHIATRY & NEUROLOGY

## 2018-10-26 PROCEDURE — 99233 SBSQ HOSP IP/OBS HIGH 50: CPT | Mod: 25,GC,, | Performed by: INTERNAL MEDICINE

## 2018-10-26 PROCEDURE — 93306 TTE W/DOPPLER COMPLETE: CPT | Mod: 26,,, | Performed by: INTERNAL MEDICINE

## 2018-10-26 PROCEDURE — 93306 TTE W/DOPPLER COMPLETE: CPT

## 2018-10-26 PROCEDURE — 25000242 PHARM REV CODE 250 ALT 637 W/ HCPCS: Performed by: NURSE PRACTITIONER

## 2018-10-26 PROCEDURE — 94761 N-INVAS EAR/PLS OXIMETRY MLT: CPT

## 2018-10-26 PROCEDURE — 83735 ASSAY OF MAGNESIUM: CPT

## 2018-10-26 PROCEDURE — S4991 NICOTINE PATCH NONLEGEND: HCPCS | Performed by: NURSE PRACTITIONER

## 2018-10-26 PROCEDURE — 36415 COLL VENOUS BLD VENIPUNCTURE: CPT

## 2018-10-26 PROCEDURE — 80048 BASIC METABOLIC PNL TOTAL CA: CPT

## 2018-10-26 PROCEDURE — 25000003 PHARM REV CODE 250: Performed by: HOSPITALIST

## 2018-10-26 PROCEDURE — 99900035 HC TECH TIME PER 15 MIN (STAT)

## 2018-10-26 PROCEDURE — 99239 HOSP IP/OBS DSCHRG MGMT >30: CPT | Mod: ,,, | Performed by: NURSE PRACTITIONER

## 2018-10-26 RX ORDER — FUROSEMIDE 80 MG/1
80 TABLET ORAL 2 TIMES DAILY
Qty: 180 TABLET | Refills: 0 | Status: SHIPPED | OUTPATIENT
Start: 2018-10-26

## 2018-10-26 RX ORDER — NAPROXEN SODIUM 220 MG/1
81 TABLET, FILM COATED ORAL DAILY
Status: DISCONTINUED | OUTPATIENT
Start: 2018-10-26 | End: 2018-10-26 | Stop reason: HOSPADM

## 2018-10-26 RX ORDER — METOLAZONE 5 MG/1
5 TABLET ORAL
Qty: 30 TABLET | Refills: 0 | Status: SHIPPED | OUTPATIENT
Start: 2018-10-26

## 2018-10-26 RX ORDER — LOSARTAN POTASSIUM 25 MG/1
25 TABLET ORAL DAILY
Qty: 90 TABLET | Refills: 0 | Status: SHIPPED | OUTPATIENT
Start: 2018-10-26

## 2018-10-26 RX ORDER — NAPROXEN SODIUM 220 MG/1
81 TABLET, FILM COATED ORAL DAILY
Refills: 0 | COMMUNITY
Start: 2018-10-27 | End: 2019-10-27

## 2018-10-26 RX ORDER — FUROSEMIDE 80 MG/1
80 TABLET ORAL 2 TIMES DAILY
Status: DISCONTINUED | OUTPATIENT
Start: 2018-10-26 | End: 2018-10-26 | Stop reason: HOSPADM

## 2018-10-26 RX ORDER — POTASSIUM CHLORIDE 750 MG/1
20 TABLET, EXTENDED RELEASE ORAL DAILY
Qty: 90 TABLET | Refills: 0 | Status: SHIPPED | OUTPATIENT
Start: 2018-10-26 | End: 2018-10-26 | Stop reason: SDUPTHER

## 2018-10-26 RX ORDER — POTASSIUM CHLORIDE 750 MG/1
20 TABLET, EXTENDED RELEASE ORAL DAILY
Qty: 90 TABLET | Refills: 0 | Status: SHIPPED | OUTPATIENT
Start: 2018-10-26

## 2018-10-26 RX ORDER — ATORVASTATIN CALCIUM 40 MG/1
40 TABLET, FILM COATED ORAL NIGHTLY
Qty: 90 TABLET | Refills: 0 | Status: SHIPPED | OUTPATIENT
Start: 2018-10-26

## 2018-10-26 RX ADMIN — METOPROLOL TARTRATE 25 MG: 25 TABLET ORAL at 03:10

## 2018-10-26 RX ADMIN — FUROSEMIDE 80 MG: 80 TABLET ORAL at 09:10

## 2018-10-26 RX ADMIN — NICOTINE 1 PATCH: 21 PATCH, EXTENDED RELEASE TRANSDERMAL at 09:10

## 2018-10-26 RX ADMIN — RANOLAZINE 500 MG: 500 TABLET, FILM COATED, EXTENDED RELEASE ORAL at 09:10

## 2018-10-26 RX ADMIN — ASPIRIN 81 MG CHEWABLE TABLET 81 MG: 81 TABLET CHEWABLE at 10:10

## 2018-10-26 RX ADMIN — METOPROLOL TARTRATE 25 MG: 25 TABLET ORAL at 09:10

## 2018-10-26 RX ADMIN — FLUTICASONE FUROATE AND VILANTEROL TRIFENATATE 1 PUFF: 100; 25 POWDER RESPIRATORY (INHALATION) at 09:10

## 2018-10-26 RX ADMIN — RIVAROXABAN 20 MG: 20 TABLET, FILM COATED ORAL at 04:10

## 2018-10-26 RX ADMIN — SENNOSIDES AND DOCUSATE SODIUM 1 TABLET: 8.6; 5 TABLET ORAL at 09:10

## 2018-10-26 RX ADMIN — IPRATROPIUM BROMIDE AND ALBUTEROL SULFATE 3 ML: .5; 3 SOLUTION RESPIRATORY (INHALATION) at 01:10

## 2018-10-26 RX ADMIN — TRAMADOL HYDROCHLORIDE 50 MG: 50 TABLET, FILM COATED ORAL at 09:10

## 2018-10-26 RX ADMIN — DIGOXIN 0.12 MG: 125 TABLET ORAL at 09:10

## 2018-10-26 RX ADMIN — LOSARTAN POTASSIUM 12.5 MG: 25 TABLET, FILM COATED ORAL at 09:10

## 2018-10-26 NOTE — ASSESSMENT & PLAN NOTE
- hospital course complicated due to hypotension, AMS, and chest pain; home medications resumed with resultant hypotensive episode (likely due to known medication noncompliance) and associated episode of aphasia and Left sided weakness, stroke code was called   -head CT negative   -Neurology was also concerned for seizure; EEG negative for seizure activity   -has an MRI compatible device however due to safety concerns with unattached ICD lead scan not performed   -given IVF bolus over several hours with minimal improvement in BPs and urine output (now resolved), nor was hydration complicated by pulmonary edema; does not recall the events and has had no further neurological events since and remains alert and oriented  -remains neuro intact

## 2018-10-26 NOTE — PROGRESS NOTES
[facility]  Cardiology  Progress Note    Patient Name: Quentin Mckeon  MRN: [id number]  Admission Date: 10/19/2018  Hospital Length of Stay: 7 days  Code Status: Full Code   Attending Physician: Ebony Ram MD   Primary Care Physician: Primary Doctor No  Expected Discharge Date: 10/26/2018  Principal Problem:Acute on chronic systolic heart failure    Subjective:     Hospital Course:   10/22: Patient back to baseline aphasia and left sided weakness resolved still in Aflutter, No chest pain or SOB.     10/23: DCCV/NITA Rescheduled for tommorow. Patient in Aflutter with mode switch pacing VVI, HR . Vol overloaded    10/24: Good urine output with lasix, less SOB awaiting DCCV/NITA then repeat Echo.     10/25: Good UOP, comfortable, no SOB, awaiting DCCV/NITA and repeat Echo    10/26: NITA showed clot in RAA and possibly on Bioprosthetic MV hence no DCCV at this time, will need to continue rate control strategy and HF medications.    Interval History: NITA showed clot in RAA and possibly on Bioprosthetic MV hence no DCCV at this time, will need to continue rate control strategy and HF medications    Review of Systems   Constitution: Negative for chills, decreased appetite and diaphoresis.   HENT: Negative for congestion and ear discharge.    Eyes: Negative for blurred vision and discharge.   Cardiovascular: Negative for chest pain, dyspnea on exertion, irregular heartbeat, leg swelling and paroxysmal nocturnal dyspnea.   Respiratory: Negative for cough, hemoptysis and shortness of breath.    Gastrointestinal: Negative for abdominal pain.        Objective:     Vital Signs (Most Recent):  Temp: 97.9 °F (36.6 °C) (10/26/18 1509)  Pulse: 74 (10/26/18 1514)  Resp: 16 (10/26/18 1509)  BP: 119/67 (10/26/18 1509)  SpO2: 96 % (10/26/18 1509) Vital Signs (24h Range):  Temp:  [97.6 °F (36.4 °C)-98.7 °F (37.1 °C)] 97.9 °F (36.6 °C)  Pulse:  [63-96] 74  Resp:  [12-18] 16  SpO2:  [93 %-100 %] 96 %  BP:  (105-130)/(59-85) 119/67     Weight: 108.5 kg (239 lb 3.2 oz)  Body mass index is 34.32 kg/m².     SpO2: 96 %  O2 Device (Oxygen Therapy): room air      Intake/Output Summary (Last 24 hours) at 10/26/2018 1601  Last data filed at 10/25/2018 2300  Gross per 24 hour   Intake 170 ml   Output 425 ml   Net -255 ml       Lines/Drains/Airways     Peripheral Intravenous Line                 Peripheral IV - Single Lumen 10/23/18 0630 Right Antecubital 3 days                Physical Exam   Constitutional: He is oriented to person, place, and time. He appears well-developed and well-nourished. No distress.   Eyes: Conjunctivae are normal. Pupils are equal, round, and reactive to light.   Neck: No tracheal deviation present. No thyromegaly present.   Cardiovascular: Normal rate, regular rhythm and intact distal pulses. Exam reveals no gallop and no friction rub.   Murmur heard.  Pulses:       Radial pulses are 2+ on the right side, and 2+ on the left side.        Femoral pulses are 2+ on the right side, and 2+ on the left side.  Pulmonary/Chest: Effort normal and breath sounds normal. No respiratory distress. He has no wheezes. He has no rales.   Abdominal: Soft. Bowel sounds are normal. He exhibits no distension. There is no tenderness.   Musculoskeletal: He exhibits no edema or deformity.   Neurological: He is alert and oriented to person, place, and time. No cranial nerve deficit. Coordination normal.   Skin: Skin is warm and dry. He is not diaphoretic.   Psychiatric: He has a normal mood and affect. His behavior is normal.       Significant Labs:   BMP:   Recent Labs   Lab 10/25/18  0427 10/26/18  0518   GLU 86 95    137   K 4.2 4.1    100   CO2 26 28   BUN 19 22   CREATININE 1.0 1.3   CALCIUM 9.1 9.6   MG 1.8 1.8   , CMP   Recent Labs   Lab 10/25/18  0427 10/26/18  0518    137   K 4.2 4.1    100   CO2 26 28   GLU 86 95   BUN 19 22   CREATININE 1.0 1.3   CALCIUM 9.1 9.6   ANIONGAP 10 9   ESTGFRAFRICA  >60.0 >60.0   EGFRNONAA >60.0 58.1*   , CBC   Recent Labs   Lab 10/25/18  0427   WBC 4.92   HGB 10.0*   HCT 33.8*   *   , Lipid Panel No results for input(s): CHOL, HDL, LDLCALC, TRIG, CHOLHDL in the last 48 hours. and Troponin No results for input(s): TROPONINI in the last 48 hours.    Significant Imaging: Echocardiogram:   2D echo with color flow doppler:   Results for orders placed or performed during the hospital encounter of 10/19/18   2D echo with color flow doppler   Result Value Ref Range    Calculated EF 25 (A) 55 - 65    Aortic Valve Regurgitation TRIVIAL     Est. PA Systolic Pressure 51.24 (A)     Tricuspid Valve Regurgitation SEVERE (A)      Assessment and Plan:     Brief HPI: 64 yo M HFrEF, Aflutter, p/w ADHF     * Acute on chronic systolic heart failure    -Agree with PO Lasix  - Watch electrolytes and aggresively replace   -  Watch BP  - Recommend switch to Metop Succinate 75mg qday now rate controlled for HF and continue ARB   Atrial flutter     Recommend switch to Metop Succinate 75mg qday now rate controlled for HF and continue ARB  - Unable to be cardioverted due to clot in RAA/MV  - Continue OAC     Mitral valve stenosis    - Repeat echo with eccentrically directed jet and acceptable MG of 7, no need for valve replacement         VTE Risk Mitigation (From admission, onward)        Ordered     rivaroxaban tablet 20 mg  with dinner      10/23/18 1601     heparin 25,000 units in dextrose 5% 250 mL (100 units/mL) infusion LOW INTENSITY nomogram - [facility]  Continuous      10/19/18 1925     heparin 25,000 units in dextrose 5% (100 units/ml) IV bolus from bag - ADDITIONAL PRN BOLUS - 60 units/kg  As needed (PRN)      10/19/18 1925     heparin 25,000 units in dextrose 5% (100 units/ml) IV bolus from bag - ADDITIONAL PRN BOLUS - 30 units/kg  As needed (PRN)      10/19/18 1925          Debra Aguiar MD  Cardiology  [facility]

## 2018-10-26 NOTE — PT/OT/SLP PROGRESS
Physical Therapy      Patient Name:  Quentin Mckeon   MRN:  [id number]    Attempted to see pt in PM. Pt politely declining to participate with therapy, stated he is going to be discharged this afternoon and already walked with OT in the morning. PT provided encouragement and education on importance of frequent ambulation to improve strength and endurance. Pt continued to decline. PT discussed safety with mobility in the home, pt reported he feels he is close to baseline mobility. PT answered all questions. Will follow-up as appropriate according to POC .    Shantell Zavaleta, PT   10/26/2018

## 2018-10-26 NOTE — PLAN OF CARE
Problem: Patient Care Overview  Goal: Plan of Care Review  Outcome: Ongoing (interventions implemented as appropriate)  Plan of care reviewed with patient.   2D Echo with color flow dopper completed.  Patient has remained free of falls/trauma/injury by using appropriate lighting, nonskid socks, and by keeping area free of debris.  Patient verbalized understanding of all instructions.

## 2018-10-26 NOTE — ASSESSMENT & PLAN NOTE
Noted to have various arrhythmias while inpatient   Seen by EP   See note regarding device interrogation

## 2018-10-26 NOTE — PLAN OF CARE
Problem: Patient Care Overview  Goal: Plan of Care Review  Plan of care discussed with patient.  Patient ambulating with personal walker with fall precautions in place. Patient has no complaints of pain. Discussed medications and care. NITA completed today.  Patient has no questions at this time. Will continue to monitor.

## 2018-10-26 NOTE — PLAN OF CARE
Problem: Occupational Therapy Goal  Goal: Occupational Therapy Goal  Goals to be met by: 11/5/2018    Pt will complete UB dressing with independence. Set-up  Pt will complete LB dressing with SBA. Pt requires MIN A for socks; SBA pants   Pt will complete grooming while standing at sink x10 minutes with supervision.--MET   Pt will complete toilet transfer with SBA utilizing AD as needed.--MET  Pt will complete toileting with SBA.--MET  Pt will engage in functional mobility to simulate household and community mobility distances with supervision utilizing AD as needed in order to maximize functional activity tolerance required for engagement in occupations of choice. --MET  Pt will retrieve 3/3 ADL items from varied height surfaces utilizing AD as needed for support with SBA and no noted LOB.--MET           Goals achieved.

## 2018-10-26 NOTE — NURSING
Patient's HR = 119 to 145.  Patient is asymptomatic, denies chest pain, SOB, dizziness, lightheadedness, dizziness, and nausea.  Marito Flores NP.

## 2018-10-26 NOTE — NURSING
Patient is complaining of neck pain and soreness when swallowing.  He requests medication for sore throat.  Administered Tramadol 50 mg oral q6h PRN for neck pain.  ERIK Flores NP, [phone], notified.  Orders to follow.

## 2018-10-26 NOTE — ASSESSMENT & PLAN NOTE
64 y/o male with sudden onset of mental status change, minimal responsiveness, urinary incontinence, and left sided weakness.  Initial imaging revealed no acute findings.  Patient returned to baseline but continues with generalized weakness.  Left leg noted to be more weak than right on exam. EEG did show generalized slowing, but no seizure activity.     NAEON. Patient unable to undergo cardioversion per cardiology note. Continue AC for 4-6 weeks, then consider cardioversion. Patient is at neurobaseline with no focal deficits. He is unable to obtain MRI secondary to unattached ICD leads. No change in current treatment. Continue medical management per primary. Recommend  continue secondary stroke prevention and xalrelto at this time. Okay to continue ASA 81mg per primary team. Primary team plans to DC patient today pending echo. Discussed with Dr. Tan, Vascular Neurology will sign off. Please re-consult us if any new/worsening neurological symptoms.       Antithrombotics for secondary stroke prevention: Anticoagulants: xalrelto     Statins for secondary stroke prevention and hyperlipidemia, if present:   Statins: Atorvastatin- 40 mg daily    Aggressive risk factor modification: HTN, Smoking, HLD, A-Fib, CAD     Rehab efforts: PT/OT/SLP to evaluate and treat; recommending HH    Diagnostics ordered/pending: MRI head without contrast to assess brain parenchyma but unable to obtain secondary to unattached ICD leads    VTE prophylaxis: Currently on heparin gtt; SCDs    BP parameters: Goal normotension

## 2018-10-26 NOTE — PT/OT/SLP PROGRESS
Occupational Therapy   Treatment and Discharge    Name: Quentin Mckeon  MRN: [id number]  Admitting Diagnosis:  Acute on chronic systolic heart failure  1 Day Post-Op    Recommendations:     Discharge Recommendations: home health PT      Subjective     Communicated with: nsg prior to session.  Pain/Comfort:  · Pain Rating 1: (pain right leg with mobilty, but pt unable to rate pain.)    Patients cultural, spiritual, Sikh conflicts given the current situation: none reported     Objective:     Patient found supine in bed:      General Precautions: Standard, fall   Orthopedic Precautions:N/A   Braces:       Occupational Performance:    Bed Mobility:    · Patient completed Supine to Sit with independence   · Pt completed sit to supine with independence    Functional Mobility/Transfers:  · Pt completed sit>stand with independence. Pt used personal rollator to complete functional mobility in room and hallway with general supervision for safety.  · Pt reports he has been t/f to commode and completing toileting without assistance.     Activities of Daily Living:  Feeding: independent  G/H: simulated in stand  UE dressing: set-up manage gown  LE dressing: LE dressing skills simulated this date. OT provided education to pt re: safe and efficient skills for dressing. Pt demo set-up for donning pants and MIN A to manage footwear.     OT also provided education re: energy conservation and activity modification due to decreased endurance and SOB noted at times with functional activity. Pt verb understanding.   Education provided re: importance of continued mobility and ADL performance as he remains in hospital. OT education pt re: sitting OOB 2 hours at a time and returning to bed to rest and then getting to chair again.     Patient left supine with all lines intact, call button in reach and nsg notified    [facility] 6 Click:  [facility] Total Score: 19      Assessment:     Quentin Mckeon is a 63 y.o. male with a medical diagnosis of Acute on  chronic systolic heart failure.    Pt tolerated session well with good effort and performance. Pt states frustration re: continued hospital stay and eager for d/c home. Pt demo set-up to MOD I with rollator for basic mobility and ADL skills. Pt has achieved goals and does not demo need for further skilled services. Pt receptive to d/c of OT services at this time.     Rehab Prognosis:  Good ; patient would benefit from acute skilled OT services to address these deficits and reach maximum level of function.       Plan:     Patient to be seen 3 x/week to address the above listed problems via self-care/home management, therapeutic exercises, therapeutic activities  · Plan of Care Expires: 11/19/18  · Plan of Care Reviewed with: patient    This Plan of care has been discussed with the patient who was involved in its development and understands and is in agreement with the identified goals and treatment plan    GOALS:   Multidisciplinary Problems     Occupational Therapy Goals        Problem: Occupational Therapy Goal    Goal Priority Disciplines Outcome Interventions   Occupational Therapy Goal     OT, PT/OT Ongoing (interventions implemented as appropriate)    Description:  Goals to be met by: 11/5/2018    Pt will complete UB dressing with independence. Set-up  Pt will complete LB dressing with SBA. Pt requires MIN A for socks; SBA pants   Pt will complete grooming while standing at sink x10 minutes with supervision.--MET   Pt will complete toilet transfer with SBA utilizing AD as needed.--MET  Pt will complete toileting with SBA.--MET  Pt will engage in functional mobility to simulate household and community mobility distances with supervision utilizing AD as needed in order to maximize functional activity tolerance required for engagement in occupations of choice. --MET  Pt will retrieve 3/3 ADL items from varied height surfaces utilizing AD as needed for support with SBA and no noted LOB.--MET                              Time Tracking:     OT Date of Treatment: 10/26/18  OT Start Time: 0831  OT Stop Time: 0855  OT Total Time (min): 24 min    Billable Minutes:Self Care/Home Management 10  Therapeutic Activity 14    MEKHI Martinez  10/26/2018

## 2018-10-26 NOTE — PLAN OF CARE
arrange hh via [facility] care with [facility] in [place] once orders are completed.  Pt states he will need a ride home.  He will return home alone to [place], [place].     10/26/18 9449   Post-Acute Status   Post-Acute Authorization Home Health/Hospice

## 2018-10-26 NOTE — ASSESSMENT & PLAN NOTE
Continue home xalreto  Being treated for afib and dvt   Per arrhythmia service, recs below.   - rate control as per primary team  - heart failure optimization as per primary team  - please keep electrolytes replenished K >4, Mg >2  - Please ensure follow up with Dr. Fitzgerald in the clinic in 6 weeks

## 2018-10-26 NOTE — PLAN OF CARE
10/26/2018  10:08 AM    Could not perform DCCV due to thrombus. Will need full AC for another 4-6 weeks and we can attempt DCCV at that time.    Tele: A-fib with rate 75-90 -- one run of PMT, one rum of NSVT 6-beats.    Plan:  - rate control as per primary team  - heart failure optimization as per primary team  - please keep electrolytes replenished K >4, Mg >2  - Please ensure follow up with Dr. Fitzgerald in the clinic in 6 weeks  - will sign off, thank you for the consult, please call back with any questions    John Paul Isbael

## 2018-10-26 NOTE — DISCHARGE SUMMARY
[facility] Hospital Medicine  Discharge Summary      Patient Name: Quentin Mckeon  MRN: [id number]  Admission Date: 10/19/2018  Hospital Length of Stay: 7 days  Discharge Date and Time:  10/26/2018 5:38 PM  Attending Physician: Ebony Ram MD   Discharging Provider: Chaya Flores NP  Primary Care Provider: Primary Doctor [facility] Medicine Team: [facility] HOSP MED  Chaya Flores NP    HPI:   Mr. Mckeon is a 63 year old male with past medical history of HFrEF (EF 35%), VF/VT s/p BiV AICD (extraction and reimplantation 2/2018 with some lead retention related to lead position under clavicle), CAD s/p CABG 2014, MV bioprosthetic repair in 2014 now with severe restenosis, AFib on Xarelto, tobacco use, and ~ daily ETOH use with most recent who presented to [facility] with SOB and chest pain x 3 days.  He was diagnosed with ADHF thought secondary to mitral bioprosthetic valve restenosis. Prior to admission at  he had a fall due to ETOH intoxication that resulted in chest trauma with subsequent small hematoma; device was interrogated finding normal impedance, During interrogation noted tachy episode that within VF zone 10/19 that was ATP'd with resolution and was in 2:1 Aflutter with rates ~100; OAC with xarelto was continued. He also had a recent prior admission at OSH for cough, body aches, productive cough, with negative flu swab. Due to multiple recent CHF admission thought secondary to mitral valve restenosis he was transferred to [facility] for evaluation by EP and CTS. He reports occasional dietary non compliance and has been out of home medications for ~a month and amiodarone for ~ 5 months. All past medical, social, and family history reviewed.     At admission CBC and chemistry stable, troponin 0.203 within historical baseline, , TSH 0.550, and HgA1c 4.9/94. The patient was admitted to the Hospital Medicine Service for further evaluation and management.     Procedure(s)  (LRB):  CARDIOVERSION (N/A)      Hospital Course:   Mr. Mckeon was transferred 10/19 due to CHF exacerbation in setting of potential MV restenosis. 2D echo completed on arrival with EF 20-25% (decline from 30-35% previously), concentric remodeling, KRYSTAL, mildly depressed RV systolic function, moderate TR, pulmHTN with PASP 52, RAP 15, and MV with mean gradient obtained across the mitral valve is 10 mmHg, effective prosthetic valve area is 3.2 cm2, and effective prosthetic valve area corrected for BSA is 1.43 cm2. He was evaluated by CTS on arrival with recommendations to optimize from a heart failure standpoint with arrhythmia control as he is a poor candidate for redo surgery. Repeat 2D echo performed to establish true gradient, which is currently 7mmHg. Due to atrial arrhythmia and concern for crushed ICD lead EP was consulted, recommended continuing OAC as surgical procedures are not indicated and continuing rate control with lopressor and dig load, NITA + for thrombus on posterior leaflet of MV.  Thrombus in Right atrial appendage with clot adhered to RA lead possibly.  NO DCCV performed d/t thrombus burden. Cardiology co-managed consulted and assisted with CHF management, plan to continue digoxin, lopressor, and increase losartan to 25mg.  Diuresed with IVP lasix, currently net negative 4.5 liters and weight decreased, physical exam has improved >> transitioned to PO maintenance dose 80 mg bid.  He has coke colored urine from hemoglobinuria from hemolysis d/t the valvular stenosis and jet's are blowing against septum on both sides.     After admission course complicated due to hypotension, AMS, and chest pain. Home medications resumed with resultant  hypotensive episode (likely due to known medication noncompliance) and associated episode of aphasia and Left sided weakness, stroke code was called; head CT negative. Neurology was also concerned for seizure; EEG negative for seizure activity. He has an MRI  compatible device however due to safety concerns with unattached ICD lead scan not performed. He was given IVF bolus over several hours with minimal improvement in BPs and urine output (now resolved), nor was hydration complicated by pulmonary edema. He does not recall the events and has had no further neurological events since and remains alert and oriented. He also had two episodes of severe chest pain, rate 10/10 that was reproducible on exam (likely related to chest trauma in setting of fall PTA), repeat EKG with v paced rhythm and troponin trend flat. Pain was managed with IV morphine and has since resolved.     Disposition plans: plan to DC home, home health to assist in CHF monitoring and medication compliance at [place]. He will need outpt follow up with CTS, EP, and primary Cardiologist/ Donaldo Headley NP notified and appt made.      Consults:   Consults (From admission, onward)        Status Ordering Provider     Case Management/  Once     Provider:  (Not yet assigned)    Acknowledged MCKINLEY RUANO     Inpatient consult to Cardiology  Once     Provider:  Patricia Johnson MD    Completed MCKINLEY RUANO     Inpatient consult to Electrophysiology  Once     Provider:  (Not yet assigned)    Completed MCKINLEY RUANO      Review of Systems   Constitutional: Positive for activity change and fatigue. Negative for appetite change, chills, diaphoresis, fever and unexpected weight change.   HENT: Negative for congestion, sinus pressure, sinus pain, sneezing and sore throat.    Respiratory: Negative for cough, chest tightness and shortness of breath.    Cardiovascular: Positive for leg swelling (at baseline). Negative for chest pain and palpitations.   Gastrointestinal: Negative for abdominal distention, abdominal pain, constipation, diarrhea, nausea and vomiting.   Genitourinary: Negative for decreased urine volume, difficulty urinating, frequency and urgency.   Musculoskeletal: Positive  for gait problem. Negative for arthralgias, back pain, joint swelling and myalgias.   Skin: Negative for color change, pallor, rash and wound.   Neurological: Negative for dizziness, syncope, weakness, light-headedness and headaches.     Physical Exam   Constitutional: He is oriented to person, place, and time. He appears well-developed and well-nourished.   HENT:   Head: Normocephalic and atraumatic.   Mouth/Throat: Mucous membranes are normal. Normal dentition. No oropharyngeal exudate.   Eyes: Conjunctivae and lids are normal. Pupils are equal, round, and reactive to light. No scleral icterus.   Neck: Normal range of motion. Neck supple. + pulsatile JVD /HJR present.   Cardiovascular: Normal rate, regular rhythm and intact distal pulses. Exam reveals no gallop and no friction rub.   Mitral Murmur high pitched blowing heard.  Pulmonary/Chest: Effort normal and breath sounds normal. He exhibits no tenderness.   Abdominal: Soft. Bowel sounds are normal. He exhibits no distension. There is no tenderness.   Musculoskeletal: Normal range of motion. He exhibits edema (BLE non pitting, ankles/feet). He exhibits no deformity.   Neurological: He is alert and oriented to person, place, and time. No cranial nerve deficit.   Skin: Skin is warm and dry. Capillary refill takes 2 to 3 seconds. No rash noted. No erythema.   Psychiatric: He has a normal mood and affect. Judgment and thought content normal.    * Acute on chronic systolic heart failure    -transferred 10/19 due to CHF exacerbation in setting of potential MV restenosis  -at admission  CBC and chemistry stable, troponin 0.203 within historical baseline, , TSH 0.550, and HgA1c 4.9/94  -2D echo completed on arrival with EF 20-25% (decline from 30-35% previously), concentric remodeling, KRYSTAL, mildly depressed RV systolic function, moderate TR, pulmHTN with PASP 52, RAP 15, and MV with mean gradient obtained across the mitral valve is 10 mmHg, effective prosthetic  valve area is 3.2 cm2, and effective prosthetic valve area corrected for BSA is 1.43 cm2  -evaluated by CTS on arrival with recommendations to optimize from a heart failure standpoint with arrhythmia control as he is a poor candidate for redo surgery   -repeat 2D echo now that he's rate controlled, current gradient 7 mmHg ? down vs unchanged   -due to atrial arrhythmia (likely contributing to CHF exacerbation) and concern for crushed ICD lead EP was consulted   -recommended continuing OAC as surgical procedures are not indicated and continuing rate control with lopressor and dig load   -s/p NITA + thrombus, no DCCV due to aformentioned  -Cardiology co-managed consulted and assisting with CHF management   -will continue digoxin, lopressor, and losartan   - add entresto as outpatient if he can afford. He couldn't afford $3.50 for lasix in the hospital at time of dc.   -diuresing with IVP lasix, currently net negative 4.5 liters and weight decreased with improved physical exam >> transitioned to PO maintenance dose   -continue tele monitoring  -cardiac diet with fluid restriction  -strict I&Os and daily weights  -will assess candidacy for home health to assist in CHF monitoring and medication compliance  -will need outpt follow up with CTS, EP, and primary Cardiologist     Stroke vs seizure    - hospital course complicated due to hypotension, AMS, and chest pain; home medications resumed with resultant hypotensive episode (likely due to known medication noncompliance) and associated episode of aphasia and Left sided weakness, stroke code was called   -head CT negative   -Neurology was also concerned for seizure; EEG negative for seizure activity   -has an MRI compatible device however due to safety concerns with unattached ICD lead scan not performed   -given IVF bolus over several hours with minimal improvement in BPs and urine output (now resolved), nor was hydration complicated by pulmonary edema; does not recall the  events and has had no further neurological events since and remains alert and oriented  -remains neuro intact       Prosthetic mitral valve stenosis bioprosthetic 2014    -see above      Mitral valve stenosis    -see above for detailed plans      Biventricular ICD (implantable cardioverter-defibrillator) in place    -see above     Ventricular tachycardia, monomorphic    -see HPI  -no tele events during hospital course  -continue tele monitoring      Hx of CABG 2014    -see above      Coronary artery disease involving native coronary artery of native heart without angina pectoris    -troponins within historical baseline, no acute ischemic EKG changes  -continue ASA, statin, lopressor, losartan, and ranolazine      Benign essential HTN    -BP stable   -hypotensive episode when home medications resumed, see above  -continue lopressor and losartan  -continue holding hydralazine, ISMN, and entresto >>resumption in outpt setting as tolerated      Chronic anticoagulation    -continue OAC with xarelto  -see above for detailed arrhythmia plans      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 [id number] score 5. Anticoagulation indicated currently. Anticoagulation done with xarelto       Chronic stable angina    -denies angina  -continue home medications as above      HLD (hyperlipidemia)    -continue statin  -lipid panel with cholesterol 98, HDL 25, LDL 60, and triglycerides 64  -cardiac diet in house       Final Active Diagnoses:    Diagnosis Date Noted POA    PRINCIPAL PROBLEM:  Acute on chronic systolic heart failure [I50.23] 02/09/2018 Yes     Chronic    Stroke vs seizure [I63.9]  10/20/2018 Yes    Prosthetic mitral valve stenosis bioprosthetic 2014 [T82.857A] 10/19/2018 Yes    Mitral valve stenosis [I05.0] 10/17/2018 Yes    Biventricular ICD (implantable cardioverter-defibrillator) in place [Z95.810] 10/19/2018 Yes    Ventricular tachycardia, monomorphic [I47.2] 02/10/2018 Yes    Coronary artery disease involving native coronary artery of native heart without angina pectoris [I25.10] 10/19/2018 Yes    Hx of CABG 2014 [Z95.1] 10/19/2018 Not Applicable    Benign essential HTN [I10] 02/09/2018 Yes    Chronic anticoagulation [Z79.01] 02/09/2018 Not Applicable    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    Specialty:  Family Medicine  Why:  9:15 Cardiology follow up  Contact information:  [address] [place] [id number]  [phone]                 Patient Instructions:      Diet Cardiac     Notify your health care provider if you experience any of the following:  temperature >100.4     Notify your health care provider if you experience any of the following:  persistent nausea and vomiting or diarrhea     Notify your health care provider if you experience any of the following:  severe uncontrolled pain     Notify your health care provider if you experience any of the following:  redness, tenderness, or signs of infection (pain, swelling, redness, odor or green/yellow discharge around incision site)     Notify your health care provider if you experience  any of the following:  difficulty breathing or increased cough     Notify your health care provider if you experience any of the following:  severe persistent headache     Notify your health care provider if you experience any of the following:  worsening rash     Notify your health care provider if you experience any of the following:  persistent dizziness, light-headedness, or visual disturbances     Notify your health care provider if you experience any of the following:  increased confusion or weakness     Activity as tolerated       Significant Diagnostic Studies: Labs: All labs within the past 24 hours have been reviewed    Pending Diagnostic Studies:     Procedure Component Value Units Date/Time    APTT [[id number]] Collected:  10/20/18 0404    Order Status:  Sent Lab Status:  In process Updated:  10/20/18 0404    Specimen:  Blood     APTT [[id number]] Collected:  10/20/18 0404    Order Status:  Sent Lab Status:  In process Updated:  10/20/18 0404    Specimen:  Blood          Medications:  Reconciled Home Medications:      Medication List      START taking these medications    aspirin 81 MG Chew  Take 1 tablet (81 mg total) by mouth once daily.  Start taking on:  10/27/2018     digoxin 125 mcg tablet  Commonly known as:  LANOXIN  Take 1 tablet (0.125 mg total) by mouth once daily.     losartan 25 MG tablet  Commonly known as:  COZAAR  Take 1 tablet (25 mg total) by mouth once daily.     metoprolol succinate 25 MG 24 hr tablet  Commonly known as:  TOPROL-XL  Take 3 tablets (75 mg total) by mouth once daily.     nicotine 21 mg/24 hr  Commonly known as:  NICODERM CQ  Place 1 patch onto the skin once daily.     potassium chloride 10 MEQ Tbsr  Commonly known as:  KLOR-CON  Take 2 tablets (20 mEq total) by mouth once daily.        CHANGE how you take these medications    atorvastatin 40 MG tablet  Commonly known as:  LIPITOR  Take 1 tablet (40 mg total) by mouth every evening.  What changed:  when to take this      furosemide 80 MG tablet  Commonly known as:  LASIX  Take 1 tablet (80 mg total) by mouth 2 (two) times daily.  What changed:    · medication strength  · how much to take     metOLazone 5 MG tablet  Commonly known as:  ZAROXOLYN  Take 1 tablet (5 mg total) by mouth as needed. Twice weekly as needed weight gain 3-5 lbs in a week  What changed:  additional instructions     rivaroxaban 20 mg Tab  Commonly known as:  XARELTO  Take 1 tablet (20 mg total) by mouth once daily. Do not take this medication for 5 days after procedure (restart on 2/18/2018)  What changed:  additional instructions        CONTINUE taking these medications    acetaminophen 325 MG tablet  Commonly known as:  TYLENOL  Take 2 tablets (650 mg total) by mouth every 4 (four) hours as needed.     budesonide-formoterol 160-4.5 mcg 160-4.5 mcg/actuation Hfaa  Commonly known as:  SYMBICORT  Inhale 2 puffs into the lungs every 12 (twelve) hours. Controller     magnesium oxide 400 mg (241.3 mg magnesium) tablet  Commonly known as:  MAG-OX  Take 400 mg by mouth once daily.     nitroGLYCERIN 0.4 MG SL tablet  Commonly known as:  NITROSTAT  Place 0.4 mg under the tongue every 5 (five) minutes as needed for Chest pain.     PROAIR HFA 90 mcg/actuation inhaler  Generic drug:  albuterol  Inhale 2 puffs into the lungs every 6 (six) hours as needed for Wheezing. Rescue     ranolazine 500 MG Tb12  Commonly known as:  RANEXA  Take 500 mg by mouth 2 (two) times daily.        STOP taking these medications    amiodarone 200 MG Tab  Commonly known as:  PACERONE     ENTRESTO 49-51 mg per tablet  Generic drug:  sacubitril-valsartan     hydrALAZINE 25 MG tablet  Commonly known as:  APRESOLINE     isosorbide mononitrate 30 MG 24 hr tablet  Commonly known as:  IMDUR            Indwelling Lines/Drains at time of discharge:   Lines/Drains/Airways          None          Time spent on the discharge of patient: 55 minutes  Patient was seen and examined on the date of discharge and  determined to be suitable for discharge.         Chaya Flores NP  Department of Hospital Medicine  [facility]  Spectra:  [phone]  Pager: [phone]

## 2018-10-26 NOTE — SUBJECTIVE & OBJECTIVE
Interval History: NITA showed clot in RAA and possibly on Bioprosthetic MV hence no DCCV at this time, will need to continue rate control strategy and HF medications    Review of Systems   Constitution: Negative for chills, decreased appetite and diaphoresis.   HENT: Negative for congestion and ear discharge.    Eyes: Negative for blurred vision and discharge.   Cardiovascular: Negative for chest pain, dyspnea on exertion, irregular heartbeat, leg swelling and paroxysmal nocturnal dyspnea.   Respiratory: Negative for cough, hemoptysis and shortness of breath.    Gastrointestinal: Negative for abdominal pain.        Objective:     Vital Signs (Most Recent):  Temp: 97.9 °F (36.6 °C) (10/26/18 1509)  Pulse: 74 (10/26/18 1514)  Resp: 16 (10/26/18 1509)  BP: 119/67 (10/26/18 1509)  SpO2: 96 % (10/26/18 1509) Vital Signs (24h Range):  Temp:  [97.6 °F (36.4 °C)-98.7 °F (37.1 °C)] 97.9 °F (36.6 °C)  Pulse:  [63-96] 74  Resp:  [12-18] 16  SpO2:  [93 %-100 %] 96 %  BP: (105-130)/(59-85) 119/67     Weight: 108.5 kg (239 lb 3.2 oz)  Body mass index is 34.32 kg/m².     SpO2: 96 %  O2 Device (Oxygen Therapy): room air      Intake/Output Summary (Last 24 hours) at 10/26/2018 1601  Last data filed at 10/25/2018 2300  Gross per 24 hour   Intake 170 ml   Output 425 ml   Net -255 ml       Lines/Drains/Airways     Peripheral Intravenous Line                 Peripheral IV - Single Lumen 10/23/18 0630 Right Antecubital 3 days                Physical Exam   Constitutional: He is oriented to person, place, and time. He appears well-developed and well-nourished. No distress.   Eyes: Conjunctivae are normal. Pupils are equal, round, and reactive to light.   Neck: No tracheal deviation present. No thyromegaly present.   Cardiovascular: Normal rate, regular rhythm and intact distal pulses. Exam reveals no gallop and no friction rub.   Murmur heard.  Pulses:       Radial pulses are 2+ on the right side, and 2+ on the left side.        Femoral  pulses are 2+ on the right side, and 2+ on the left side.  Pulmonary/Chest: Effort normal and breath sounds normal. No respiratory distress. He has no wheezes. He has no rales.   Abdominal: Soft. Bowel sounds are normal. He exhibits no distension. There is no tenderness.   Musculoskeletal: He exhibits no edema or deformity.   Neurological: He is alert and oriented to person, place, and time. No cranial nerve deficit. Coordination normal.   Skin: Skin is warm and dry. He is not diaphoretic.   Psychiatric: He has a normal mood and affect. His behavior is normal.       Significant Labs:   BMP:   Recent Labs   Lab 10/25/18  0427 10/26/18  0518   GLU 86 95    137   K 4.2 4.1    100   CO2 26 28   BUN 19 22   CREATININE 1.0 1.3   CALCIUM 9.1 9.6   MG 1.8 1.8   , CMP   Recent Labs   Lab 10/25/18  0427 10/26/18  0518    137   K 4.2 4.1    100   CO2 26 28   GLU 86 95   BUN 19 22   CREATININE 1.0 1.3   CALCIUM 9.1 9.6   ANIONGAP 10 9   ESTGFRAFRICA >60.0 >60.0   EGFRNONAA >60.0 58.1*   , CBC   Recent Labs   Lab 10/25/18  0427   WBC 4.92   HGB 10.0*   HCT 33.8*   *   , Lipid Panel No results for input(s): CHOL, HDL, LDLCALC, TRIG, CHOLHDL in the last 48 hours. and Troponin No results for input(s): TROPONINI in the last 48 hours.    Significant Imaging: Echocardiogram:   2D echo with color flow doppler:   Results for orders placed or performed during the hospital encounter of 10/19/18   2D echo with color flow doppler   Result Value Ref Range    Calculated EF 25 (A) 55 - 65    Aortic Valve Regurgitation TRIVIAL     Est. PA Systolic Pressure 51.24 (A)     Tricuspid Valve Regurgitation SEVERE (A)

## 2018-10-26 NOTE — SUBJECTIVE & OBJECTIVE
Neurologic Chief Complaint: AMS    Subjective:     Interval History: Patient is seen for follow-up neurological assessment and treatment recommendations:     NEREIDA. Patient could not cardioversion get done yesterday secondary to thrombus so cardiology recomeneded 4-6 weeks of full anticoagulation before can attempt cardioversion. He is currently on xalrelto. Primary team plans to DC patient home today pending echo results. Therapy recommending HH. Patient is back at neurobaseline. He has no complaints on my exam. He continues to have RLE weakness but uses a rollator for ambulation at baseline. Previous EEGs negative. Patient unable to get MRI brain secondary to remaining unattached ICD leads present.       HPI, Past Medical, Family, and Social History remains the same as documented in the initial encounter.     Review of Systems   Constitutional: no fever, chills or night sweats. No changes in weight   Eyes: no visual changes   ENT: no nasal congestion or sore throat   Respiratory: no cough or shortness of breath   Cardiovascular: no chest pain or palpitations   Gastrointestinal: no nausea or vomiting   Genitourinary: no hematuria or dysuria   Integument/Breast: no rash or pruritis   Hematologic/Lymphatic: no easy bruising or lymphadenopathy   Musculoskeletal: no arthralgias or myalgias.  Neurological: no seizures or tremors. Positive for baseline RLE weakness. No paresthesia.   Behavioral/Psych: no auditory or visual hallucinations   Endocrine: no heat or cold intolerance     Scheduled Meds:   albuterol-ipratropium  3 mL Nebulization Q6H WAKE    aspirin  81 mg Oral Daily    atorvastatin  40 mg Oral QHS    digoxin  0.125 mg Oral Daily    fluticasone-vilanterol  1 puff Inhalation Daily    furosemide  60 mg Oral BID    losartan  12.5 mg Oral Daily    metoprolol tartrate  25 mg Oral TID    nicotine  1 patch Transdermal Daily    ranolazine  500 mg Oral BID    rivaroxaban  20 mg Oral with dinner     senna-docusate 8.6-50 mg  1 tablet Oral BID     Continuous Infusions:  PRN Meds:acetaminophen, albuterol, dextrose 50%, dextrose 50%, GI cocktail (mylanta 30 mL, lidocaine 2 % viscous 10 mL, dicyclomine 10 mL) 50 mL, glucagon (human recombinant), glucose, glucose, nitroGLYCERIN, ondansetron, sodium chloride 0.9%, traMADol    Objective:     Vital Signs (Most Recent):  Temp: 97.6 °F (36.4 °C) (10/26/18 1122)  Pulse: 75 (10/26/18 1123)  Resp: 16 (10/26/18 0911)  BP: 105/67 (10/26/18 1122)  SpO2: (!) 94 % (10/26/18 1122)  BP Location: Right arm    Vital Signs Range (Last 24H):  Temp:  [97.6 °F (36.4 °C)-98.7 °F (37.1 °C)]   Pulse:  [74-96]   Resp:  [12-18]   BP: (105-130)/(59-85)   SpO2:  [93 %-100 %]   BP Location: Right arm    Physical Exam  Vital signs: reviewed above.   Constitutional: well-developed, no acute distress  Cardiovascular: regular rate and rhythm  Resp: normal respirations, not labored, no accessory muscles used  Abdomen: soft, non-distended, not tender to palpation  Pulses: palpable distal pulses   Skin: warm, dry and intact, no rashes  Neurological  GCS: Motor: 6/Verbal: 5/Eyes: 4 GCS Total: 15  Mental Status: Awake, Alert, Oriented x 4  Follows commands   No dysarthria or aphasia  Good attention span  Head: normocephalic, atraumatic       Visual Fields: Full       EOM (CN III, IV, VI): Full/intact       Pupils (CN II, III): PERRL        Facial Sensation (CN V): Normal       Facial Movement (CN VII): Symmetric facial expression   Moves all extremities with good strength except for RLE 4+/5  RLE drift present  Intact to light touch throughout  Normal tone throughout    Shoulder shrug symmetric  Tongue midline, palate raises symmetrically   Brows raise symmetrically       Laboratory:  CMP:   Recent Labs   Lab 10/26/18  0518   CALCIUM 9.6      K 4.1   CO2 28      BUN 22   CREATININE 1.3     CBC:   Recent Labs   Lab 10/25/18  2320   WBC 4.92   RBC 3.25*   HGB 10.0*   HCT 33.8*   *   MCV  104*   MCH 30.8   MCHC 29.6*     Lipid Panel:   Recent Labs   Lab 10/20/18  0404   CHOL 98*   LDLCALC 60.2*   HDL 25*   TRIG 64     Coagulation:   Recent Labs   Lab 10/19/18  2113  10/24/18  0620   INR 1.4*  --   --    APTT 27.1   < > 28.2    < > = values in this interval not displayed.     Platelet Aggregation Study: No results for input(s): PLTAGG, PLTAGINTERP, PLTAGREGLACO, ADPPLTAGGREG in the last 168 hours.  Hgb A1C:   Recent Labs   Lab 10/19/18  1358   HGBA1C 4.9     TSH:   Recent Labs   Lab 10/19/18  1358   TSH 0.550       Diagnostic Results     Brain imaging:  CTA head and neck pending read  No obvious hemorrhage when reviewed during scanning      Vessel Imaging   none    Cardiac Imaging   2d echo -10/19/18  Moderately enlarged LA    1 - Severely depressed left ventricular systolic function (EF 20-25%).     2 - Concentric remodeling.     3 - Biatrial enlargement.     4 - Low normal to mildly depressed right ventricular systolic function .     5 - Mitral valve prosthesis.     6 - Moderate tricuspid regurgitation.     7 - Increased central venous pressure.     8 - Pulmonary hypertension. The estimated PA systolic pressure is 52 mmHg.

## 2018-10-26 NOTE — PROGRESS NOTES
[facility]  Vascular Neurology  Comprehensive Stroke Center  Progress Note    Assessment/Plan:     * Acute on chronic systolic heart failure    EF 20-25 on echo 10/19/18 and 10/26/2018   Patient with aicd   Management per primary team     Stroke vs seizure    62 y/o male with sudden onset of mental status change, minimal responsiveness, urinary incontinence, and left sided weakness.  Initial imaging revealed no acute findings.  Patient returned to baseline but continues with generalized weakness.  Left leg noted to be more weak than right on exam. EEG did show generalized slowing, but no seizure activity.     NAEON. Patient unable to undergo cardioversion per cardiology note. Continue AC for 4-6 weeks, then consider cardioversion. Patient is at neurobaseline with no focal deficits. He is unable to obtain MRI secondary to unattached ICD leads. No change in current treatment. Continue medical management per primary. Recommend  continue secondary stroke prevention and xalrelto at this time. Okay to continue ASA 81mg per primary team. Primary team plans to DC patient today pending echo. Discussed with Dr. Tan, Vascular Neurology will sign off. Please re-consult us if any new/worsening neurological symptoms.       Antithrombotics for secondary stroke prevention: Anticoagulants: xalrelto     Statins for secondary stroke prevention and hyperlipidemia, if present:   Statins: Atorvastatin- 40 mg daily    Aggressive risk factor modification: HTN, Smoking, HLD, A-Fib, CAD     Rehab efforts: PT/OT/SLP to evaluate and treat; recommending HH    Diagnostics ordered/pending: MRI head without contrast to assess brain parenchyma but unable to obtain secondary to unattached ICD leads    VTE prophylaxis: Currently on heparin gtt; SCDs    BP parameters: Goal normotension         Atrial flutter    Stroke risk factor  On xarelto  Management per primary team     Ventricular tachycardia, monomorphic    Noted to have  various arrhythmias while inpatient   Seen by EP   See note regarding device interrogation      Chronic anticoagulation    Continue home xalreto  Being treated for afib and dvt   Per arrhythmia service, recs below.   - rate control as per primary team  - heart failure optimization as per primary team  - please keep electrolytes replenished K >4, Mg >2  - Please ensure follow up with Dr. Fitzgerald in the clinic in 6 weeks           HLD (hyperlipidemia)    Stroke risk factor  LDL 60.2  Atorvastatin 40 mg     Benign essential HTN    Stroke risk factor   Recommend normotensive           10/23: Remains on heparin gtt.  Plan for cardioversion at some point.  Recommending MRI after cardioversion.    STROKE DOCUMENTATION   Acute Stroke Times   Last Known Normal Date: 10/19/18  Last Known Normal Time: 1755  Symptom Onset Date: 10/19/18  Symptom Onset Time: 1755  Stroke Team Called Date: 10/19/18  Stroke Team Called Time: 1807  Stroke Team Arrival Date: 10/19/18  Stroke Team Arrival Time: 1809  CT Interpretation Time: 1845    NIH Scale:  1a. Level Of Consciousness: 0-->Alert: keenly responsive  1b. LOC Questions: 0-->Answers both questions correctly  1c. LOC Commands: 0-->Performs both tasks correctly  2. Best Gaze: 0-->Normal  3. Visual: 0-->No visual loss  4. Facial Palsy: 0-->Normal symmetrical movements  5a. Motor Arm, Left: 0-->No drift: limb holds 90 (or 45) degrees for full 10 secs  5b. Motor Arm, Right: 0-->No drift: limb holds 90 (or 45) degrees for full 10 secs  6a. Motor Leg, Left: 0-->No drift: leg holds 30 degree position for full 5 secs  6b. Motor Leg, Right: 1-->Drift: leg falls by the end of the 5-sec period but does not hit bed  7. Limb Ataxia: 0-->Absent  8. Sensory: 0-->Normal: no sensory loss  9. Best Language: 0-->No aphasia: normal  10. Dysarthria: 0-->Normal  11. Extinction and Inattention (formerly Neglect): 0-->No abnormality  Total (NIH Stroke Scale): 1       Modified Pratt Score: 3  Jason Coma  Scale:15   ABCD2 Score:    [id number] Score:   HAS -BLED Score:   ICH Score:   Hunt & Pichardo Classification:      Hemorrhagic change of an Ischemic Stroke: Does this patient have an ischemic stroke with hemorrhagic changes? No     Neurologic Chief Complaint: AMS    Subjective:     Interval History: Patient is seen for follow-up neurological assessment and treatment recommendations:     NEREIDA. Patient could not cardioversion get done yesterday secondary to thrombus so cardiology recomeneded 4-6 weeks of full anticoagulation before can attempt cardioversion. He is currently on xalrelto. Primary team plans to DC patient home today pending echo results. Therapy recommending HH. Patient is back at neurobaseline. He has no complaints on my exam. He continues to have RLE weakness but uses a rollator for ambulation at baseline. Previous EEGs negative. Patient unable to get MRI brain secondary to remaining unattached ICD leads present.       HPI, Past Medical, Family, and Social History remains the same as documented in the initial encounter.     Review of Systems   Constitutional: no fever, chills or night sweats. No changes in weight   Eyes: no visual changes   ENT: no nasal congestion or sore throat   Respiratory: no cough or shortness of breath   Cardiovascular: no chest pain or palpitations   Gastrointestinal: no nausea or vomiting   Genitourinary: no hematuria or dysuria   Integument/Breast: no rash or pruritis   Hematologic/Lymphatic: no easy bruising or lymphadenopathy   Musculoskeletal: no arthralgias or myalgias.  Neurological: no seizures or tremors. Positive for baseline RLE weakness. No paresthesia.   Behavioral/Psych: no auditory or visual hallucinations   Endocrine: no heat or cold intolerance     Scheduled Meds:   albuterol-ipratropium  3 mL Nebulization Q6H WAKE    aspirin  81 mg Oral Daily    atorvastatin  40 mg Oral QHS    digoxin  0.125 mg Oral Daily    fluticasone-vilanterol  1 puff Inhalation Daily     furosemide  60 mg Oral BID    losartan  12.5 mg Oral Daily    metoprolol tartrate  25 mg Oral TID    nicotine  1 patch Transdermal Daily    ranolazine  500 mg Oral BID    rivaroxaban  20 mg Oral with dinner    senna-docusate 8.6-50 mg  1 tablet Oral BID     Continuous Infusions:  PRN Meds:acetaminophen, albuterol, dextrose 50%, dextrose 50%, GI cocktail (mylanta 30 mL, lidocaine 2 % viscous 10 mL, dicyclomine 10 mL) 50 mL, glucagon (human recombinant), glucose, glucose, nitroGLYCERIN, ondansetron, sodium chloride 0.9%, traMADol    Objective:     Vital Signs (Most Recent):  Temp: 97.6 °F (36.4 °C) (10/26/18 1122)  Pulse: 75 (10/26/18 1123)  Resp: 16 (10/26/18 0911)  BP: 105/67 (10/26/18 1122)  SpO2: (!) 94 % (10/26/18 1122)  BP Location: Right arm    Vital Signs Range (Last 24H):  Temp:  [97.6 °F (36.4 °C)-98.7 °F (37.1 °C)]   Pulse:  [74-96]   Resp:  [12-18]   BP: (105-130)/(59-85)   SpO2:  [93 %-100 %]   BP Location: Right arm    Physical Exam  Vital signs: reviewed above.   Constitutional: well-developed, no acute distress  Cardiovascular: regular rate and rhythm  Resp: normal respirations, not labored, no accessory muscles used  Abdomen: soft, non-distended, not tender to palpation  Pulses: palpable distal pulses   Skin: warm, dry and intact, no rashes  Neurological  GCS: Motor: 6/Verbal: 5/Eyes: 4 GCS Total: 15  Mental Status: Awake, Alert, Oriented x 4  Follows commands   No dysarthria or aphasia  Good attention span  Head: normocephalic, atraumatic       Visual Fields: Full       EOM (CN III, IV, VI): Full/intact       Pupils (CN II, III): PERRL        Facial Sensation (CN V): Normal       Facial Movement (CN VII): Symmetric facial expression   Moves all extremities with good strength except for RLE 4+/5  RLE drift present  Intact to light touch throughout  Normal tone throughout    Shoulder shrug symmetric  Tongue midline, palate raises symmetrically   Brows raise symmetrically        Laboratory:  CMP:   Recent Labs   Lab 10/26/18  0518   CALCIUM 9.6      K 4.1   CO2 28      BUN 22   CREATININE 1.3     CBC:   Recent Labs   Lab 10/25/18  0427   WBC 4.92   RBC 3.25*   HGB 10.0*   HCT 33.8*   *   *   MCH 30.8   MCHC 29.6*     Lipid Panel:   Recent Labs   Lab 10/20/18  0404   CHOL 98*   LDLCALC 60.2*   HDL 25*   TRIG 64     Coagulation:   Recent Labs   Lab 10/19/18  2113  10/24/18  0620   INR 1.4*  --   --    APTT 27.1   < > 28.2    < > = values in this interval not displayed.     Platelet Aggregation Study: No results for input(s): PLTAGG, PLTAGINTERP, PLTAGREGLACO, ADPPLTAGGREG in the last 168 hours.  Hgb A1C:   Recent Labs   Lab 10/19/18  1358   HGBA1C 4.9     TSH:   Recent Labs   Lab 10/19/18  1358   TSH 0.550       Diagnostic Results     Brain imaging:  CTA head and neck pending read  No obvious hemorrhage when reviewed during scanning      Vessel Imaging   none    Cardiac Imaging   2d echo -10/19/18  Moderately enlarged LA    1 - Severely depressed left ventricular systolic function (EF 20-25%).     2 - Concentric remodeling.     3 - Biatrial enlargement.     4 - Low normal to mildly depressed right ventricular systolic function .     5 - Mitral valve prosthesis.     6 - Moderate tricuspid regurgitation.     7 - Increased central venous pressure.     8 - Pulmonary hypertension. The estimated PA systolic pressure is 52 mmHg.       Jersey Thomas PA-C  Comprehensive Stroke Center  Department of Vascular Neurology   [facility]

## 2018-10-26 NOTE — ASSESSMENT & PLAN NOTE
- Repeat echo with eccentrically directed jet and acceptable MG of 7, no need for valve replacement

## 2018-10-26 NOTE — ASSESSMENT & PLAN NOTE
-Agree with PO Lasix  - Watch electrolytes and aggresively replace   -  Watch BP  - Recommend switch to Metop Succinate 75mg qday now rate controlled for HF and continue ARB  - Can stop digoxin post DCCV, due to compliance issues and toxicity complications, we were using primarily for rate control of flutter.

## 2018-10-26 NOTE — ASSESSMENT & PLAN NOTE
- not much can be done since he's not a surgical candidate, monitor H/h periodically and transfused as necessary

## 2018-10-26 NOTE — ASSESSMENT & PLAN NOTE
-transferred 10/19 due to CHF exacerbation in setting of potential MV restenosis  -at admission  CBC and chemistry stable, troponin 0.203 within historical baseline, , TSH 0.550, and HgA1c 4.9/94  -2D echo completed on arrival with EF 20-25% (decline from 30-35% previously), concentric remodeling, KRYSTAL, mildly depressed RV systolic function, moderate TR, pulmHTN with PASP 52, RAP 15, and MV with mean gradient obtained across the mitral valve is 10 mmHg, effective prosthetic valve area is 3.2 cm2, and effective prosthetic valve area corrected for BSA is 1.43 cm2  -evaluated by CTS on arrival with recommendations to optimize from a heart failure standpoint with arrhythmia control as he is a poor candidate for redo surgery   -repeat 2D echo now that he's rate controlled, current gradient 7 mmHg ? down vs unchanged   -due to atrial arrhythmia (likely contributing to CHF exacerbation) and concern for crushed ICD lead EP was consulted   -recommended continuing OAC as surgical procedures are not indicated and continuing rate control with lopressor and dig load   -s/p NITA + thrombus, no DCCV due to aformentioned  -Cardiology co-managed consulted and assisting with CHF management   -will continue digoxin, lopressor, and losartan   - add entresto as outpatient if he can afford. He couldn't afford $3.50 for lasix in the hospital at time of dc.   -diuresing with IVP lasix, currently net negative 4.5 liters and weight decreased with improved physical exam >> transitioned to PO maintenance dose   -continue tele monitoring  -cardiac diet with fluid restriction  -strict I&Os and daily weights  -will assess candidacy for home health to assist in CHF monitoring and medication compliance  -will need outpt follow up with CTS, EP, and primary Cardiologist

## 2018-10-26 NOTE — ASSESSMENT & PLAN NOTE
Atrial Fibrillation controlled currently with Digoxin. [id number] score 5. Anticoagulation indicated currently. Anticoagulation done with xarelto

## 2018-10-28 NOTE — PLAN OF CARE
10/28/18 1615   Final Note   Assessment Type Final Discharge Note   Anticipated Discharge Disposition Home-Health   Hospital Follow Up  Appt(s) scheduled? Yes

## 2018-10-29 ENCOUNTER — PATIENT OUTREACH (OUTPATIENT)
Dept: ADMINISTRATIVE | Facility: CLINIC | Age: 63
End: 2018-10-29

## 2018-10-29 NOTE — PATIENT INSTRUCTIONS
Discharge Instructions for Cardioversion  Your healthcare provider performed a procedure called cardioversion. Your healthcare provider used a controlled electric shock or a medicine to briefly stop all electrical activity in your heart. This helped restore your hearts normal rhythm. Here are some instructions to follow while you recover.  Home care  · Because cardioversion typically requires sedation, you won't be able to drive home. You will need a ride. Wait at least 24 hours before driving a car or operating heavy machinery after receiving sedating medicines.  · Dont be alarmed if the skin on your chest is irritated or feels like it is sunburned. Your healthcare provider may prescribe a soothing lotion to relieve this discomfort. These minor symptoms will go away in a few days.  · Ask your healthcare provider about medicines to keep your heart rhythm steady.  · If you were prescribed medicine, take it as instructed by your healthcare provider. Dont skip doses or take double doses. Cardioversion requires blood thinners for at least 4 weeks to prevent a delayed risk of stroke when treating atrial fibrillation or atrial flutter. Be sure you discuss which medicine you are taking to prevent stroke. Ask when you need to have your medicine levels checked, and whether you may be able to stop taking it in the future or whether it is recommended that you take it for life. Some of these blood-thinning medicines will have the dose adjusted, and interact with other medicines or foods. Your healthcare team will give you full instructions on what to watch out for. Report bleeding or symptoms of stroke immediately to your healthcare team and seek emergency medical attention.  · Learn to take your own pulse. Keep a record of your results. Ask your healthcare provider when you should seek emergency medical attention. He or she will tell you which pulse rate reading is dangerous.   · Keep in mind this procedure may need to be  repeated if the abnormal heart rhythm returns. After the procedure, your healthcare provider will tell you if the treatment worked or if you will need further treatments or medication.  Follow-up care  Make a follow-up appointment, or as directed by our staff.     When to call your healthcare provider  Call 911 right away if you have:  · Chest pain  · Shortness of breath  · Loss of vision, speech, or strength or coordination in any body part  Otherwise, call your healthcare provider immediately if you have:  · Fainting, dizzy, or lightheaded  · Chest pain with increased activity  · Irregular heartbeat or fast pulse  · Bleeding issues from blood-thinning medicines   Date Last Reviewed: 3/1/2018  © 9001-9301 [employer]. [address] [place] [address], [place] [address]. All rights reserved. This information is not intended as a substitute for professional medical care. Always follow your healthcare professional's instructions.

## 2018-10-30 NOTE — PT/OT/SLP DISCHARGE
Physical Therapy Discharge Summary    Name: Quentin Mckeon  MRN: [id number]   Principal Problem: Acute on chronic systolic heart failure     Patient Discharged from acute Physical Therapy on 10/26/2018.  Please refer to prior PT noted date on 10/23/2018 for functional status.     Assessment:     Patient was discharged unexpectedly.  Information required to complete an accurate discharge summary is unknown.  Refer to therapy initial evaluation and last progress note for initial and most recent functional status and goal achievement.  Recommendations made may be found in medical record.    Objective:     GOALS:   Multidisciplinary Problems     Physical Therapy Goals        Problem: Physical Therapy Goal    Goal Priority Disciplines Outcome Goal Variances Interventions   Physical Therapy Goal     PT, PT/OT Ongoing (interventions implemented as appropriate)     Description:  Goals to be met by: 2018     Patient will increase functional independence with mobility by performin. Supine to sit with [place]  2. Sit to stand transfer with Modified [place]  3. Gait  x 200 feet with Modified [place] using rollator.   4. Lower extremity exercise program x20 reps per handout, with independence                      Reasons for Discontinuation of Therapy Services  Transfer to alternate level of care.      Plan:     Patient Discharged to: Home with Home Health Service.    Shantell Zavaleta, PT  10/30/2018

## 2024-01-18 NOTE — PT/OT/SLP EVAL
Occupational Therapy   Evaluation    Name: Quentin Mckeon  MRN: [id number]  Admitting Diagnosis:  Acute on chronic systolic heart failure      Recommendations:     Discharge Recommendations: home with home health  Discharge Equipment Recommendations:  none  Barriers to discharge:  Decreased caregiver support    History:     Occupational Profile:  Living Environment: Pt lives alone in 1st floor apartment with 0STE. Bathroom has a tub/shower combo.   Previous level of function: PTA, pt reports he was independent with ADLs/cleaning, had hired help for cleaning, and sister completed grocery shopping and laundry. Pt does not drive or work and utilizes a rollator at all times for functional mobility. He reports one fall within the past 6 months, during which he slipped on something while cooking at home.   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   Equipment Used at Home:  rollator, bedside commode(pt does not utilize BSC)  Assistance upon Discharge: Pt reports he has no reliable assistance at d/c.     History reviewed. No pertinent past medical history.    Past Surgical History:   Procedure Laterality Date    EXTRACTION-LEAD N/A 2/12/2018    Performed by Jose Fitzgerald MD at [facility] OR 2ND FLR    EXTRACTION-LEAD N/A 2/12/2018    Performed by Jose Fitzgerald MD at [facility] CATH LAB    INSERTION-ICD-BIVENTRICULAR N/A 2/12/2018    Performed by Jose Fitzgerald MD at [facility] CATH LAB       Subjective     Chief Complaint: chest pain   Patient/Family Comments/goals: return home     Pain/Comfort:  · Pain Rating 1: (pt reporting pain in center of chest, however not rating )  · Pain Addressed 1: Reposition, Distraction, Nurse notified  · Pain Rating Post-Intervention 1: 0/10    Patients cultural, spiritual, Bahai conflicts given the current situation: none reported     Objective:     Communicated with: RN prior to session.  Patient found with telemetry, peripheral IV upon OT entry to room.    General Precautions: Standard,  fall   Orthopedic Precautions:N/A   Braces: N/A     Occupational Performance:    Bed Mobility:    · Supine>sit: SBA  · HOB slightly elevated  · utilizing side rail for assistance  · Scooting: SBA  · Anteriorly towards EOB    Functional Mobility/Transfers:  · Sit<>stand: CGA  · From EOB/onto chair utilizing rollator with min vc's to lock brakes prior to transfer  · Functional Mobility: Pt engaging in functional mobility to simulate household distances approx 2 ft with CGA and rollator in order to assess functional activity tolerance required for engagement in occupations of choice.   · Increased time for directional change to position hips in front of chair prior to sitting     Activities of Daily Living:  · LB Dressing: CGA  · Pt able to access BLE's to adjust socks via 4 point position   · CGA for standing balance   · UB Dressing: min A   · Assist required 2/2 multiple IV lines     Cognitive/Visual Perceptual:  · Pt alert and oriented x4  · Pt with good insight into condition and with fair motivation to engage in therapy session  · Pt able to follow multi-step commands 100% of the time  · Effective verbal communication    · Intact short and long term memory  · Impaired safety awareness  · No visual deficits present    Physical Exam:  Balance:  · Sitting: supervision sitting at EOB  · Standing: CGA   Postural Examination: no deviations noted   Skin Integrity: intact   Edema: none noted   Sensation: intact to light touch   Dominant Hand: L handed  UE ROM:  · Right: impaired-approx 45 degrees shoulder flexion (baseline), WFL in all other joints   · Left: impaired-approx 45 degrees shoulder flexion (baseline), WFL in all other joints   UE Strength:  · Right: unable to formally assess  · Left: unable to formally assess   Strength:  · Right: WFL  · Left: WFL  Fine Motor Control: WFL  Gross Motor Control: WFL    AMPAC 6 Click ADL:  AMPAC Total Score: 20    Treatment & Education:  · Communicated OT POC  · Updated  "communication board  · Educated on importance of OOB mobility with assist 3-4x/day, sitting up in chair, maximizing independence with ADLs, and continued engagement with therapy  · No family present during session  · Once in chair, pt reporting SOB and requiring O2 via NC-RN notified and aware   Education:    Patient left up in chair with all lines intact, call button in reach and RN notified    Assessment:     Quentin Mckeon is a 63 y.o. male with a medical diagnosis of Acute on chronic systolic heart failure.  He presents with the following performance deficits affecting function: impaired self care skills, impaired endurance, impaired functional mobilty, impaired cardiopulmonary response to activity, decreased safety awareness. Pt with fair motivation to engage in therapy evaluation and with fair insight into condition. Pt p/w decreased independence with ADLs, functional transfers, and functional mobility at this time d/t decreased UE ROM/strength, decreased standing tolerance, decreased safety awareness, and decreased functional activity tolerance. Pt would benefit from HH at d/c to maximize independence with ADLs and functional mobility to ensure safe return to PLOF in the least restrictive environement.     Rehab Prognosis: Good; patient would benefit from acute skilled OT services to address these deficits and reach maximum level of function.      Clinical Decision Makin.  OT Low:  "Pt evaluation falls under low complexity for evaluation coding due to performance deficits noted in 1-3 areas as stated above and 0 co-morbities affecting current functional status. Data obtained from problem focused assessments. No modifications or assistance was required for completion of evaluation. Only brief occupational profile and history review completed."     Plan:     Patient to be seen 3 x/week to address the above listed problems via self-care/home management, therapeutic exercises, therapeutic activities  · Plan " of Care Expires: 11/19/18  · Plan of Care Reviewed with: patient    This Plan of care has been discussed with the patient who was involved in its development and understands and is in agreement with the identified goals and treatment plan    GOALS:   Multidisciplinary Problems     Occupational Therapy Goals        Problem: Occupational Therapy Goal    Goal Priority Disciplines Outcome Interventions   Occupational Therapy Goal     OT, PT/OT Ongoing (interventions implemented as appropriate)    Description:  Goals to be met by: 11/5/2018    Pt will complete UB dressing with independence.   Pt will complete LB dressing with SBA.  Pt will complete grooming while standing at sink x10 minutes with supervision.  Pt will complete toilet transfer with SBA utilizing AD as needed.  Pt will complete toileting with SBA.  Pt will engage in functional mobility to simulate household and community mobility distances with supervision utilizing AD as needed in order to maximize functional activity tolerance required for engagement in occupations of choice.   Pt will retrieve 3/3 ADL items from varied height surfaces utilizing AD as needed for support with SBA and no noted LOB.                            Time Tracking:     OT Date of Treatment: 10/22/18  OT Start Time: 1006  OT Stop Time: 1025  OT Total Time (min): 19 min    Billable Minutes:Evaluation 19 minutes    Jenni Watt OT  10/22/2018     no rash/no itching/no dryness/no change in size/color of mole

## 2025-07-09 NOTE — HOSPITAL COURSE
2/9/2018: Admission date. EP contacted who state that there is RV lead dyfunction with plan of exchange and revision on Monday 1/12 with CTS back-up  02/10/2018: No acute events overnight. Patient denies chest pain, shortness of breath, palpitations. Without fevers or chills.   02/11/2018: NAEON. Awaiting possible procedure Monday. Denies chest pain, SOB, palpitations.   02/12/2018: NAEON. Awaiting procedure today. No subjective complaints.  02/13/2018: ICD revision/replacement yesterday. NAEON, some minor pain, well controlled with oral medication. No chest pain/ syncope/ dizziness. No nausea/vomiting. Feels well and ready for discharge home.   
1.79